# Patient Record
Sex: FEMALE | Race: WHITE | NOT HISPANIC OR LATINO
[De-identification: names, ages, dates, MRNs, and addresses within clinical notes are randomized per-mention and may not be internally consistent; named-entity substitution may affect disease eponyms.]

---

## 2018-08-27 PROBLEM — Z00.00 ENCOUNTER FOR PREVENTIVE HEALTH EXAMINATION: Status: ACTIVE | Noted: 2018-08-27

## 2018-08-28 ENCOUNTER — TRANSCRIPTION ENCOUNTER (OUTPATIENT)
Age: 58
End: 2018-08-28

## 2018-08-28 ENCOUNTER — APPOINTMENT (OUTPATIENT)
Dept: SURGERY | Facility: CLINIC | Age: 58
End: 2018-08-28
Payer: COMMERCIAL

## 2018-08-28 VITALS
BODY MASS INDEX: 39.89 KG/M2 | TEMPERATURE: 98.4 F | SYSTOLIC BLOOD PRESSURE: 146 MMHG | WEIGHT: 239.42 LBS | HEART RATE: 89 BPM | DIASTOLIC BLOOD PRESSURE: 88 MMHG | HEIGHT: 65 IN | OXYGEN SATURATION: 94 %

## 2018-08-28 DIAGNOSIS — Z86.79 PERSONAL HISTORY OF OTHER DISEASES OF THE CIRCULATORY SYSTEM: ICD-10-CM

## 2018-08-28 DIAGNOSIS — Z86.39 PERSONAL HISTORY OF OTHER ENDOCRINE, NUTRITIONAL AND METABOLIC DISEASE: ICD-10-CM

## 2018-08-28 DIAGNOSIS — Z78.9 OTHER SPECIFIED HEALTH STATUS: ICD-10-CM

## 2018-08-28 DIAGNOSIS — Z80.41 FAMILY HISTORY OF MALIGNANT NEOPLASM OF OVARY: ICD-10-CM

## 2018-08-28 DIAGNOSIS — Z80.42 FAMILY HISTORY OF MALIGNANT NEOPLASM OF PROSTATE: ICD-10-CM

## 2018-08-28 DIAGNOSIS — Z72.821 INADEQUATE SLEEP HYGIENE: ICD-10-CM

## 2018-08-28 DIAGNOSIS — Z80.3 FAMILY HISTORY OF MALIGNANT NEOPLASM OF BREAST: ICD-10-CM

## 2018-08-28 DIAGNOSIS — Z87.39 PERSONAL HISTORY OF OTHER DISEASES OF THE MUSCULOSKELETAL SYSTEM AND CONNECTIVE TISSUE: ICD-10-CM

## 2018-08-28 DIAGNOSIS — Z98.890 OTHER SPECIFIED POSTPROCEDURAL STATES: ICD-10-CM

## 2018-08-28 DIAGNOSIS — Z87.891 PERSONAL HISTORY OF NICOTINE DEPENDENCE: ICD-10-CM

## 2018-08-28 DIAGNOSIS — Z86.59 PERSONAL HISTORY OF OTHER MENTAL AND BEHAVIORAL DISORDERS: ICD-10-CM

## 2018-08-28 PROCEDURE — 99205 OFFICE O/P NEW HI 60 MIN: CPT

## 2018-08-29 PROBLEM — Z80.41 FAMILY HISTORY OF MALIGNANT NEOPLASM OF OVARY: Status: ACTIVE | Noted: 2018-08-28

## 2018-08-29 PROBLEM — Z80.42 FAMILY HISTORY OF MALIGNANT NEOPLASM OF PROSTATE: Status: ACTIVE | Noted: 2018-08-28

## 2018-08-29 PROBLEM — Z86.59 HISTORY OF DEPRESSION: Status: RESOLVED | Noted: 2018-08-28 | Resolved: 2018-08-29

## 2018-08-29 PROBLEM — Z86.79 HISTORY OF HYPERTENSION: Status: RESOLVED | Noted: 2018-08-28 | Resolved: 2018-08-29

## 2018-08-29 PROBLEM — Z80.3 FAMILY HISTORY OF BREAST CANCER: Status: ACTIVE | Noted: 2018-08-29

## 2018-08-29 PROBLEM — Z86.39 HISTORY OF DIABETES MELLITUS: Status: RESOLVED | Noted: 2018-08-28 | Resolved: 2018-08-29

## 2018-08-29 PROBLEM — Z80.3 FAMILY HISTORY OF MALIGNANT NEOPLASM OF BREAST: Status: ACTIVE | Noted: 2018-08-28

## 2018-08-29 PROBLEM — Z78.9 CAFFEINE USE: Status: ACTIVE | Noted: 2018-08-28

## 2018-08-29 PROBLEM — Z98.890 HISTORY OF BENIGN BREAST BIOPSY: Status: RESOLVED | Noted: 2018-08-28 | Resolved: 2018-08-29

## 2018-08-29 PROBLEM — Z86.39 HISTORY OF THYROID DISORDER: Status: RESOLVED | Noted: 2018-08-28 | Resolved: 2018-08-29

## 2018-08-29 PROBLEM — Z87.891 FORMER SMOKER: Status: ACTIVE | Noted: 2018-08-28

## 2018-08-29 PROBLEM — Z87.39 HISTORY OF ARTHRITIS: Status: RESOLVED | Noted: 2018-08-28 | Resolved: 2018-08-29

## 2018-08-29 PROBLEM — Z72.821 HISTORY OF DIFFICULTY SLEEPING: Status: RESOLVED | Noted: 2018-08-28 | Resolved: 2018-08-29

## 2018-08-29 PROBLEM — Z86.59 HISTORY OF ANXIETY: Status: RESOLVED | Noted: 2018-08-28 | Resolved: 2018-08-29

## 2018-08-29 PROBLEM — Z86.39 HISTORY OF HIGH CHOLESTEROL: Status: RESOLVED | Noted: 2018-08-28 | Resolved: 2018-08-29

## 2018-08-29 PROBLEM — Z80.41 FAMILY HISTORY OF OVARIAN CANCER: Status: ACTIVE | Noted: 2018-08-29

## 2018-08-30 ENCOUNTER — RESULT REVIEW (OUTPATIENT)
Age: 58
End: 2018-08-30

## 2018-08-30 ENCOUNTER — APPOINTMENT (OUTPATIENT)
Dept: ULTRASOUND IMAGING | Facility: CLINIC | Age: 58
End: 2018-08-30
Payer: COMMERCIAL

## 2018-08-30 ENCOUNTER — OUTPATIENT (OUTPATIENT)
Dept: OUTPATIENT SERVICES | Facility: HOSPITAL | Age: 58
LOS: 1 days | End: 2018-08-30
Payer: COMMERCIAL

## 2018-08-30 DIAGNOSIS — Z00.8 ENCOUNTER FOR OTHER GENERAL EXAMINATION: ICD-10-CM

## 2018-08-30 PROCEDURE — 19084 BX BREAST ADD LESION US IMAG: CPT

## 2018-08-30 PROCEDURE — 19083 BX BREAST 1ST LESION US IMAG: CPT

## 2018-08-30 PROCEDURE — 77065 DX MAMMO INCL CAD UNI: CPT | Mod: 26,LT

## 2018-08-30 PROCEDURE — 19084 BX BREAST ADD LESION US IMAG: CPT | Mod: LT

## 2018-08-30 PROCEDURE — 19083 BX BREAST 1ST LESION US IMAG: CPT | Mod: LT

## 2018-08-30 PROCEDURE — A4648: CPT

## 2018-08-30 PROCEDURE — 77065 DX MAMMO INCL CAD UNI: CPT

## 2018-09-07 ENCOUNTER — APPOINTMENT (OUTPATIENT)
Dept: SURGERY | Facility: CLINIC | Age: 58
End: 2018-09-07
Payer: COMMERCIAL

## 2018-09-07 ENCOUNTER — OUTPATIENT (OUTPATIENT)
Dept: OUTPATIENT SERVICES | Facility: HOSPITAL | Age: 58
LOS: 1 days | Discharge: HOME | End: 2018-09-07
Payer: COMMERCIAL

## 2018-09-07 DIAGNOSIS — C50.919 MALIGNANT NEOPLASM OF UNSPECIFIED SITE OF UNSPECIFIED FEMALE BREAST: ICD-10-CM

## 2018-09-07 PROCEDURE — 99215 OFFICE O/P EST HI 40 MIN: CPT

## 2018-09-13 ENCOUNTER — APPOINTMENT (OUTPATIENT)
Dept: HEMATOLOGY ONCOLOGY | Facility: CLINIC | Age: 58
End: 2018-09-13
Payer: COMMERCIAL

## 2018-09-13 VITALS
TEMPERATURE: 98.7 F | WEIGHT: 241.51 LBS | BODY MASS INDEX: 40.24 KG/M2 | HEART RATE: 96 BPM | DIASTOLIC BLOOD PRESSURE: 93 MMHG | OXYGEN SATURATION: 97 % | SYSTOLIC BLOOD PRESSURE: 148 MMHG | HEIGHT: 65 IN

## 2018-09-13 PROCEDURE — 99205 OFFICE O/P NEW HI 60 MIN: CPT

## 2018-09-14 ENCOUNTER — OUTPATIENT (OUTPATIENT)
Dept: OUTPATIENT SERVICES | Facility: HOSPITAL | Age: 58
LOS: 1 days | End: 2018-09-14
Payer: COMMERCIAL

## 2018-09-14 ENCOUNTER — APPOINTMENT (OUTPATIENT)
Dept: MRI IMAGING | Facility: CLINIC | Age: 58
End: 2018-09-14
Payer: COMMERCIAL

## 2018-09-14 DIAGNOSIS — Z00.8 ENCOUNTER FOR OTHER GENERAL EXAMINATION: ICD-10-CM

## 2018-09-14 PROCEDURE — C8908: CPT

## 2018-09-14 PROCEDURE — 0159T: CPT | Mod: 26

## 2018-09-14 PROCEDURE — 77059 MRI BREAST BILATERAL: CPT | Mod: 26

## 2018-09-14 PROCEDURE — A9585: CPT

## 2018-09-14 PROCEDURE — C8937: CPT

## 2018-09-17 ENCOUNTER — APPOINTMENT (OUTPATIENT)
Dept: SURGERY | Facility: CLINIC | Age: 58
End: 2018-09-17

## 2018-09-17 ENCOUNTER — APPOINTMENT (OUTPATIENT)
Dept: SURGERY | Facility: CLINIC | Age: 58
End: 2018-09-17
Payer: COMMERCIAL

## 2018-09-17 VITALS
BODY MASS INDEX: 39.99 KG/M2 | HEIGHT: 65 IN | SYSTOLIC BLOOD PRESSURE: 146 MMHG | DIASTOLIC BLOOD PRESSURE: 83 MMHG | TEMPERATURE: 99 F | OXYGEN SATURATION: 85 % | HEART RATE: 53 BPM | WEIGHT: 240 LBS

## 2018-09-17 PROCEDURE — 99214 OFFICE O/P EST MOD 30 MIN: CPT

## 2018-09-20 ENCOUNTER — LABORATORY RESULT (OUTPATIENT)
Age: 58
End: 2018-09-20

## 2018-09-20 ENCOUNTER — APPOINTMENT (OUTPATIENT)
Dept: HEMATOLOGY ONCOLOGY | Facility: CLINIC | Age: 58
End: 2018-09-20
Payer: COMMERCIAL

## 2018-09-20 ENCOUNTER — RESULT REVIEW (OUTPATIENT)
Age: 58
End: 2018-09-20

## 2018-09-20 VITALS
SYSTOLIC BLOOD PRESSURE: 150 MMHG | HEIGHT: 65 IN | DIASTOLIC BLOOD PRESSURE: 90 MMHG | WEIGHT: 239.97 LBS | TEMPERATURE: 98.6 F | BODY MASS INDEX: 39.98 KG/M2 | HEART RATE: 78 BPM | OXYGEN SATURATION: 95 %

## 2018-09-20 LAB
BASOPHILS # BLD AUTO: 0.1 K/UL — SIGNIFICANT CHANGE UP (ref 0–0.2)
BASOPHILS NFR BLD AUTO: 1.1 % — SIGNIFICANT CHANGE UP (ref 0–2)
EOSINOPHIL # BLD AUTO: 0.1 K/UL — SIGNIFICANT CHANGE UP (ref 0–0.5)
EOSINOPHIL NFR BLD AUTO: 1.9 % — SIGNIFICANT CHANGE UP (ref 0–6)
HCT VFR BLD CALC: 51.3 % — HIGH (ref 34.5–45)
HGB BLD-MCNC: 16.4 G/DL — HIGH (ref 11.5–15.5)
LYMPHOCYTES # BLD AUTO: 2.5 K/UL — SIGNIFICANT CHANGE UP (ref 1–3.3)
LYMPHOCYTES # BLD AUTO: 37.2 % — SIGNIFICANT CHANGE UP (ref 13–44)
MCHC RBC-ENTMCNC: 29.4 PG — SIGNIFICANT CHANGE UP (ref 27–34)
MCHC RBC-ENTMCNC: 31.9 GM/DL — LOW (ref 32–36)
MCV RBC AUTO: 92.2 FL — SIGNIFICANT CHANGE UP (ref 80–100)
MONOCYTES # BLD AUTO: 0.5 K/UL — SIGNIFICANT CHANGE UP (ref 0–0.9)
MONOCYTES NFR BLD AUTO: 6.9 % — SIGNIFICANT CHANGE UP (ref 2–14)
NEUTROPHILS # BLD AUTO: 3.5 K/UL — SIGNIFICANT CHANGE UP (ref 1.8–7.4)
NEUTROPHILS NFR BLD AUTO: 52.9 % — SIGNIFICANT CHANGE UP (ref 43–77)
PLATELET # BLD AUTO: 260 K/UL — SIGNIFICANT CHANGE UP (ref 150–400)
RBC # BLD: 5.57 M/UL — HIGH (ref 3.8–5.2)
RBC # FLD: 12 % — SIGNIFICANT CHANGE UP (ref 10.3–14.5)
WBC # BLD: 6.6 K/UL — SIGNIFICANT CHANGE UP (ref 3.8–10.5)
WBC # FLD AUTO: 6.6 K/UL — SIGNIFICANT CHANGE UP (ref 3.8–10.5)

## 2018-09-20 PROCEDURE — 99214 OFFICE O/P EST MOD 30 MIN: CPT

## 2018-09-21 LAB
ALBUMIN SERPL ELPH-MCNC: 5 G/DL
ALP BLD-CCNC: 76 U/L
ALT SERPL-CCNC: 18 U/L
ANION GAP SERPL CALC-SCNC: 14 MMOL/L
APTT BLD: 31 SEC
AST SERPL-CCNC: 19 U/L
BILIRUB SERPL-MCNC: 0.4 MG/DL
BUN SERPL-MCNC: 15 MG/DL
CALCIUM SERPL-MCNC: 9.9 MG/DL
CHLORIDE SERPL-SCNC: 101 MMOL/L
CO2 SERPL-SCNC: 26 MMOL/L
CREAT SERPL-MCNC: 0.79 MG/DL
HBV CORE IGM SER QL: NONREACTIVE
HBV SURFACE AB SER QL: NONREACTIVE
HBV SURFACE AB SERPL IA-ACNC: <3 MIU/ML
HBV SURFACE AG SER QL: NONREACTIVE
HCV AB SER QL: NONREACTIVE
HCV S/CO RATIO: 0.14 S/CO
INR PPP: 0.99 RATIO
MAGNESIUM SERPL-MCNC: 2.4 MG/DL
POTASSIUM SERPL-SCNC: 5.1 MMOL/L
PROT SERPL-MCNC: 7.4 G/DL
PT BLD: 11.2 SEC
SODIUM SERPL-SCNC: 141 MMOL/L
TSH SERPL-ACNC: 7.53 UIU/ML

## 2018-09-26 ENCOUNTER — FORM ENCOUNTER (OUTPATIENT)
Age: 58
End: 2018-09-26

## 2018-09-26 ENCOUNTER — APPOINTMENT (OUTPATIENT)
Dept: INTERVENTIONAL RADIOLOGY/VASCULAR | Facility: CLINIC | Age: 58
End: 2018-09-26

## 2018-09-27 ENCOUNTER — OUTPATIENT (OUTPATIENT)
Dept: OUTPATIENT SERVICES | Facility: HOSPITAL | Age: 58
LOS: 1 days | End: 2018-09-27
Payer: COMMERCIAL

## 2018-09-27 ENCOUNTER — APPOINTMENT (OUTPATIENT)
Dept: CARDIOLOGY | Facility: CLINIC | Age: 58
End: 2018-09-27

## 2018-09-27 VITALS
OXYGEN SATURATION: 96 % | WEIGHT: 240.08 LBS | DIASTOLIC BLOOD PRESSURE: 91 MMHG | SYSTOLIC BLOOD PRESSURE: 146 MMHG | HEIGHT: 65 IN | RESPIRATION RATE: 16 BRPM | TEMPERATURE: 98 F | HEART RATE: 71 BPM

## 2018-09-27 VITALS
HEART RATE: 66 BPM | RESPIRATION RATE: 16 BRPM | SYSTOLIC BLOOD PRESSURE: 146 MMHG | OXYGEN SATURATION: 99 % | DIASTOLIC BLOOD PRESSURE: 87 MMHG

## 2018-09-27 DIAGNOSIS — C50.912 MALIGNANT NEOPLASM OF UNSPECIFIED SITE OF LEFT FEMALE BREAST: ICD-10-CM

## 2018-09-27 PROCEDURE — 76937 US GUIDE VASCULAR ACCESS: CPT | Mod: 26

## 2018-09-27 PROCEDURE — 36561 INSERT TUNNELED CV CATH: CPT

## 2018-09-27 PROCEDURE — 76942 ECHO GUIDE FOR BIOPSY: CPT

## 2018-09-27 PROCEDURE — C1769: CPT

## 2018-09-27 PROCEDURE — C1788: CPT

## 2018-09-27 PROCEDURE — 77001 FLUOROGUIDE FOR VEIN DEVICE: CPT

## 2018-09-27 PROCEDURE — 77001 FLUOROGUIDE FOR VEIN DEVICE: CPT | Mod: 26

## 2018-09-27 RX ADMIN — Medication 100 MILLIGRAM(S): at 15:49

## 2018-09-27 NOTE — BRIEF OPERATIVE NOTE - PROCEDURE
<<-----Click on this checkbox to enter Procedure Imaging guidance for placement of Port-A-Cath  09/27/2018    Active  JASON

## 2018-09-27 NOTE — BRIEF OPERATIVE NOTE - COMMENTS
successful RIJ approach port catheter placement with the tip in the superior cavoatrial junction  Port is ready for immediate use

## 2018-09-28 ENCOUNTER — APPOINTMENT (OUTPATIENT)
Age: 58
End: 2018-09-28

## 2018-09-28 ENCOUNTER — RX RENEWAL (OUTPATIENT)
Age: 58
End: 2018-09-28

## 2018-09-28 ENCOUNTER — LABORATORY RESULT (OUTPATIENT)
Age: 58
End: 2018-09-28

## 2018-09-28 ENCOUNTER — RESULT REVIEW (OUTPATIENT)
Age: 58
End: 2018-09-28

## 2018-09-28 ENCOUNTER — APPOINTMENT (OUTPATIENT)
Dept: CARDIOLOGY | Facility: CLINIC | Age: 58
End: 2018-09-28
Payer: COMMERCIAL

## 2018-09-28 LAB
BASOPHILS # BLD AUTO: 0.1 K/UL — SIGNIFICANT CHANGE UP (ref 0–0.2)
BASOPHILS NFR BLD AUTO: 0.6 % — SIGNIFICANT CHANGE UP (ref 0–2)
EOSINOPHIL # BLD AUTO: 0.1 K/UL — SIGNIFICANT CHANGE UP (ref 0–0.5)
EOSINOPHIL NFR BLD AUTO: 0.9 % — SIGNIFICANT CHANGE UP (ref 0–6)
HCT VFR BLD CALC: 44.5 % — SIGNIFICANT CHANGE UP (ref 34.5–45)
HGB BLD-MCNC: 14.7 G/DL — SIGNIFICANT CHANGE UP (ref 11.5–15.5)
LYMPHOCYTES # BLD AUTO: 3.8 K/UL — HIGH (ref 1–3.3)
LYMPHOCYTES # BLD AUTO: 41.2 % — SIGNIFICANT CHANGE UP (ref 13–44)
MCHC RBC-ENTMCNC: 29.8 PG — SIGNIFICANT CHANGE UP (ref 27–34)
MCHC RBC-ENTMCNC: 33 GM/DL — SIGNIFICANT CHANGE UP (ref 32–36)
MCV RBC AUTO: 90.4 FL — SIGNIFICANT CHANGE UP (ref 80–100)
MONOCYTES # BLD AUTO: 0.6 K/UL — SIGNIFICANT CHANGE UP (ref 0–0.9)
MONOCYTES NFR BLD AUTO: 6.6 % — SIGNIFICANT CHANGE UP (ref 2–14)
NEUTROPHILS # BLD AUTO: 4.7 K/UL — SIGNIFICANT CHANGE UP (ref 1.8–7.4)
NEUTROPHILS NFR BLD AUTO: 50.7 % — SIGNIFICANT CHANGE UP (ref 43–77)
PLATELET # BLD AUTO: 184 K/UL — SIGNIFICANT CHANGE UP (ref 150–400)
RBC # BLD: 4.92 M/UL — SIGNIFICANT CHANGE UP (ref 3.8–5.2)
RBC # FLD: 11.9 % — SIGNIFICANT CHANGE UP (ref 10.3–14.5)
WBC # BLD: 9.3 K/UL — SIGNIFICANT CHANGE UP (ref 3.8–10.5)
WBC # FLD AUTO: 9.3 K/UL — SIGNIFICANT CHANGE UP (ref 3.8–10.5)

## 2018-09-28 PROCEDURE — 0399T: CPT

## 2018-09-28 PROCEDURE — 93306 TTE W/DOPPLER COMPLETE: CPT

## 2018-09-28 PROCEDURE — 93010 ELECTROCARDIOGRAM REPORT: CPT

## 2018-10-01 ENCOUNTER — APPOINTMENT (OUTPATIENT)
Age: 58
End: 2018-10-01

## 2018-10-01 DIAGNOSIS — Z51.11 ENCOUNTER FOR ANTINEOPLASTIC CHEMOTHERAPY: ICD-10-CM

## 2018-10-01 DIAGNOSIS — Z51.89 ENCOUNTER FOR OTHER SPECIFIED AFTERCARE: ICD-10-CM

## 2018-10-01 DIAGNOSIS — R11.2 NAUSEA WITH VOMITING, UNSPECIFIED: ICD-10-CM

## 2018-10-02 ENCOUNTER — APPOINTMENT (OUTPATIENT)
Dept: PLASTIC SURGERY | Facility: CLINIC | Age: 58
End: 2018-10-02

## 2018-10-03 ENCOUNTER — OUTPATIENT (OUTPATIENT)
Dept: OUTPATIENT SERVICES | Facility: HOSPITAL | Age: 58
LOS: 1 days | Discharge: HOME | End: 2018-10-03

## 2018-10-03 DIAGNOSIS — C50.919 MALIGNANT NEOPLASM OF UNSPECIFIED SITE OF UNSPECIFIED FEMALE BREAST: ICD-10-CM

## 2018-10-09 ENCOUNTER — APPOINTMENT (OUTPATIENT)
Dept: HEMATOLOGY ONCOLOGY | Facility: CLINIC | Age: 58
End: 2018-10-09
Payer: COMMERCIAL

## 2018-10-09 ENCOUNTER — RESULT REVIEW (OUTPATIENT)
Age: 58
End: 2018-10-09

## 2018-10-09 VITALS
HEIGHT: 65 IN | DIASTOLIC BLOOD PRESSURE: 91 MMHG | HEART RATE: 99 BPM | BODY MASS INDEX: 39.34 KG/M2 | WEIGHT: 236.11 LBS | SYSTOLIC BLOOD PRESSURE: 134 MMHG | TEMPERATURE: 99.8 F | OXYGEN SATURATION: 96 %

## 2018-10-09 DIAGNOSIS — R19.7 DIARRHEA, UNSPECIFIED: ICD-10-CM

## 2018-10-09 LAB
BASOPHILS # BLD AUTO: 0.3 K/UL — HIGH (ref 0–0.2)
BASOPHILS NFR BLD AUTO: 2.1 % — HIGH (ref 0–2)
EOSINOPHIL # BLD AUTO: 0.3 K/UL — SIGNIFICANT CHANGE UP (ref 0–0.5)
EOSINOPHIL NFR BLD AUTO: 2.2 % — SIGNIFICANT CHANGE UP (ref 0–6)
HCT VFR BLD CALC: 45.6 % — HIGH (ref 34.5–45)
HGB BLD-MCNC: 15 G/DL — SIGNIFICANT CHANGE UP (ref 11.5–15.5)
LYMPHOCYTES # BLD AUTO: 16.8 % — SIGNIFICANT CHANGE UP (ref 13–44)
LYMPHOCYTES # BLD AUTO: 2.2 K/UL — SIGNIFICANT CHANGE UP (ref 1–3.3)
MCHC RBC-ENTMCNC: 29.6 PG — SIGNIFICANT CHANGE UP (ref 27–34)
MCHC RBC-ENTMCNC: 32.9 GM/DL — SIGNIFICANT CHANGE UP (ref 32–36)
MCV RBC AUTO: 90.1 FL — SIGNIFICANT CHANGE UP (ref 80–100)
MONOCYTES # BLD AUTO: 0.6 K/UL — SIGNIFICANT CHANGE UP (ref 0–0.9)
MONOCYTES NFR BLD AUTO: 4.3 % — SIGNIFICANT CHANGE UP (ref 2–14)
NEUTROPHILS # BLD AUTO: 9.9 K/UL — HIGH (ref 1.8–7.4)
NEUTROPHILS NFR BLD AUTO: 74.6 % — SIGNIFICANT CHANGE UP (ref 43–77)
PLATELET # BLD AUTO: 150 K/UL — SIGNIFICANT CHANGE UP (ref 150–400)
RBC # BLD: 5.06 M/UL — SIGNIFICANT CHANGE UP (ref 3.8–5.2)
RBC # FLD: 11.4 % — SIGNIFICANT CHANGE UP (ref 10.3–14.5)
WBC # BLD: 13.2 K/UL — HIGH (ref 3.8–10.5)
WBC # FLD AUTO: 13.2 K/UL — HIGH (ref 3.8–10.5)

## 2018-10-09 PROCEDURE — 99214 OFFICE O/P EST MOD 30 MIN: CPT

## 2018-10-12 ENCOUNTER — LABORATORY RESULT (OUTPATIENT)
Age: 58
End: 2018-10-12

## 2018-10-12 ENCOUNTER — APPOINTMENT (OUTPATIENT)
Age: 58
End: 2018-10-12

## 2018-10-12 ENCOUNTER — RESULT REVIEW (OUTPATIENT)
Age: 58
End: 2018-10-12

## 2018-10-12 LAB
BASOPHILS # BLD AUTO: 0.2 K/UL — SIGNIFICANT CHANGE UP (ref 0–0.2)
BASOPHILS NFR BLD AUTO: 2 % — SIGNIFICANT CHANGE UP (ref 0–2)
EOSINOPHIL # BLD AUTO: 0.1 K/UL — SIGNIFICANT CHANGE UP (ref 0–0.5)
EOSINOPHIL NFR BLD AUTO: 0.7 % — SIGNIFICANT CHANGE UP (ref 0–6)
HCT VFR BLD CALC: 45.2 % — HIGH (ref 34.5–45)
HGB BLD-MCNC: 15.1 G/DL — SIGNIFICANT CHANGE UP (ref 11.5–15.5)
LYMPHOCYTES # BLD AUTO: 2.1 K/UL — SIGNIFICANT CHANGE UP (ref 1–3.3)
LYMPHOCYTES # BLD AUTO: 20.6 % — SIGNIFICANT CHANGE UP (ref 13–44)
MCHC RBC-ENTMCNC: 30 PG — SIGNIFICANT CHANGE UP (ref 27–34)
MCHC RBC-ENTMCNC: 33.3 GM/DL — SIGNIFICANT CHANGE UP (ref 32–36)
MCV RBC AUTO: 90.1 FL — SIGNIFICANT CHANGE UP (ref 80–100)
MONOCYTES # BLD AUTO: 0.6 K/UL — SIGNIFICANT CHANGE UP (ref 0–0.9)
MONOCYTES NFR BLD AUTO: 6.4 % — SIGNIFICANT CHANGE UP (ref 2–14)
NEUTROPHILS # BLD AUTO: 7 K/UL — SIGNIFICANT CHANGE UP (ref 1.8–7.4)
NEUTROPHILS NFR BLD AUTO: 70.2 % — SIGNIFICANT CHANGE UP (ref 43–77)
PLATELET # BLD AUTO: 261 K/UL — SIGNIFICANT CHANGE UP (ref 150–400)
RBC # BLD: 5.01 M/UL — SIGNIFICANT CHANGE UP (ref 3.8–5.2)
RBC # FLD: 11.7 % — SIGNIFICANT CHANGE UP (ref 10.3–14.5)
WBC # BLD: 10 K/UL — SIGNIFICANT CHANGE UP (ref 3.8–10.5)
WBC # FLD AUTO: 10 K/UL — SIGNIFICANT CHANGE UP (ref 3.8–10.5)

## 2018-10-15 DIAGNOSIS — Z51.89 ENCOUNTER FOR OTHER SPECIFIED AFTERCARE: ICD-10-CM

## 2018-10-15 DIAGNOSIS — R11.2 NAUSEA WITH VOMITING, UNSPECIFIED: ICD-10-CM

## 2018-10-15 DIAGNOSIS — Z51.11 ENCOUNTER FOR ANTINEOPLASTIC CHEMOTHERAPY: ICD-10-CM

## 2018-10-15 LAB
C DIFF TOX GENS STL QL NAA+PROBE: NORMAL
CDIFF BY PCR: DETECTED

## 2018-10-19 ENCOUNTER — APPOINTMENT (OUTPATIENT)
Dept: HEMATOLOGY ONCOLOGY | Facility: CLINIC | Age: 58
End: 2018-10-19

## 2018-10-26 ENCOUNTER — RESULT REVIEW (OUTPATIENT)
Age: 58
End: 2018-10-26

## 2018-10-26 ENCOUNTER — LABORATORY RESULT (OUTPATIENT)
Age: 58
End: 2018-10-26

## 2018-10-26 ENCOUNTER — APPOINTMENT (OUTPATIENT)
Age: 58
End: 2018-10-26

## 2018-10-26 LAB
BASOPHILS # BLD AUTO: 0.3 K/UL — HIGH (ref 0–0.2)
BASOPHILS NFR BLD AUTO: 1.4 % — SIGNIFICANT CHANGE UP (ref 0–2)
EOSINOPHIL # BLD AUTO: 0 K/UL — SIGNIFICANT CHANGE UP (ref 0–0.5)
EOSINOPHIL NFR BLD AUTO: 0.1 % — SIGNIFICANT CHANGE UP (ref 0–6)
HCT VFR BLD CALC: 44.2 % — SIGNIFICANT CHANGE UP (ref 34.5–45)
HGB BLD-MCNC: 14.8 G/DL — SIGNIFICANT CHANGE UP (ref 11.5–15.5)
LYMPHOCYTES # BLD AUTO: 1.5 K/UL — SIGNIFICANT CHANGE UP (ref 1–3.3)
LYMPHOCYTES # BLD AUTO: 7.3 % — LOW (ref 13–44)
MCHC RBC-ENTMCNC: 29.8 PG — SIGNIFICANT CHANGE UP (ref 27–34)
MCHC RBC-ENTMCNC: 33.5 GM/DL — SIGNIFICANT CHANGE UP (ref 32–36)
MCV RBC AUTO: 88.8 FL — SIGNIFICANT CHANGE UP (ref 80–100)
MONOCYTES # BLD AUTO: 1.2 K/UL — HIGH (ref 0–0.9)
MONOCYTES NFR BLD AUTO: 5.7 % — SIGNIFICANT CHANGE UP (ref 2–14)
NEUTROPHILS # BLD AUTO: 17.8 K/UL — HIGH (ref 1.8–7.4)
NEUTROPHILS NFR BLD AUTO: 85.5 % — HIGH (ref 43–77)
PLATELET # BLD AUTO: 190 K/UL — SIGNIFICANT CHANGE UP (ref 150–400)
RBC # BLD: 4.98 M/UL — SIGNIFICANT CHANGE UP (ref 3.8–5.2)
RBC # FLD: 12.2 % — SIGNIFICANT CHANGE UP (ref 10.3–14.5)
WBC # BLD: 20.8 K/UL — HIGH (ref 3.8–10.5)
WBC # FLD AUTO: 20.8 K/UL — HIGH (ref 3.8–10.5)

## 2018-11-08 ENCOUNTER — OUTPATIENT (OUTPATIENT)
Dept: OUTPATIENT SERVICES | Facility: HOSPITAL | Age: 58
LOS: 1 days | Discharge: HOME | End: 2018-11-08
Payer: COMMERCIAL

## 2018-11-08 DIAGNOSIS — C50.919 MALIGNANT NEOPLASM OF UNSPECIFIED SITE OF UNSPECIFIED FEMALE BREAST: ICD-10-CM

## 2018-11-09 ENCOUNTER — LABORATORY RESULT (OUTPATIENT)
Age: 58
End: 2018-11-09

## 2018-11-09 ENCOUNTER — RESULT REVIEW (OUTPATIENT)
Age: 58
End: 2018-11-09

## 2018-11-09 ENCOUNTER — APPOINTMENT (OUTPATIENT)
Age: 58
End: 2018-11-09

## 2018-11-09 ENCOUNTER — APPOINTMENT (OUTPATIENT)
Dept: HEMATOLOGY ONCOLOGY | Facility: CLINIC | Age: 58
End: 2018-11-09
Payer: COMMERCIAL

## 2018-11-09 VITALS
DIASTOLIC BLOOD PRESSURE: 94 MMHG | BODY MASS INDEX: 39.63 KG/M2 | HEIGHT: 65 IN | OXYGEN SATURATION: 96 % | TEMPERATURE: 96 F | SYSTOLIC BLOOD PRESSURE: 137 MMHG | HEART RATE: 85 BPM | WEIGHT: 237.87 LBS

## 2018-11-09 LAB
BASOPHILS # BLD AUTO: 0.2 K/UL — SIGNIFICANT CHANGE UP (ref 0–0.2)
BASOPHILS NFR BLD AUTO: 1.5 % — SIGNIFICANT CHANGE UP (ref 0–2)
EOSINOPHIL # BLD AUTO: 0 K/UL — SIGNIFICANT CHANGE UP (ref 0–0.5)
EOSINOPHIL NFR BLD AUTO: 0.4 % — SIGNIFICANT CHANGE UP (ref 0–6)
HCT VFR BLD CALC: 38.9 % — SIGNIFICANT CHANGE UP (ref 34.5–45)
HGB BLD-MCNC: 13.1 G/DL — SIGNIFICANT CHANGE UP (ref 11.5–15.5)
LYMPHOCYTES # BLD AUTO: 1.1 K/UL — SIGNIFICANT CHANGE UP (ref 1–3.3)
LYMPHOCYTES # BLD AUTO: 9.4 % — LOW (ref 13–44)
MCHC RBC-ENTMCNC: 30 PG — SIGNIFICANT CHANGE UP (ref 27–34)
MCHC RBC-ENTMCNC: 33.6 GM/DL — SIGNIFICANT CHANGE UP (ref 32–36)
MCV RBC AUTO: 89.1 FL — SIGNIFICANT CHANGE UP (ref 80–100)
MONOCYTES # BLD AUTO: 1 K/UL — HIGH (ref 0–0.9)
MONOCYTES NFR BLD AUTO: 8.8 % — SIGNIFICANT CHANGE UP (ref 2–14)
NEUTROPHILS # BLD AUTO: 9.4 K/UL — HIGH (ref 1.8–7.4)
NEUTROPHILS NFR BLD AUTO: 79.9 % — HIGH (ref 43–77)
PLATELET # BLD AUTO: 198 K/UL — SIGNIFICANT CHANGE UP (ref 150–400)
RBC # BLD: 4.36 M/UL — SIGNIFICANT CHANGE UP (ref 3.8–5.2)
RBC # FLD: 14 % — SIGNIFICANT CHANGE UP (ref 10.3–14.5)
WBC # BLD: 11.8 K/UL — HIGH (ref 3.8–10.5)
WBC # FLD AUTO: 11.8 K/UL — HIGH (ref 3.8–10.5)

## 2018-11-09 PROCEDURE — 99214 OFFICE O/P EST MOD 30 MIN: CPT

## 2018-11-12 DIAGNOSIS — R11.2 NAUSEA WITH VOMITING, UNSPECIFIED: ICD-10-CM

## 2018-11-12 DIAGNOSIS — Z51.89 ENCOUNTER FOR OTHER SPECIFIED AFTERCARE: ICD-10-CM

## 2018-11-12 DIAGNOSIS — Z51.11 ENCOUNTER FOR ANTINEOPLASTIC CHEMOTHERAPY: ICD-10-CM

## 2018-11-12 NOTE — ASSESSMENT
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  Breast MRI with multicentric disease in left breast, largest abnormal enhancement measures 6.7 cm and extends towards 2.4 cm retroareolar mass.  No axillary adenopathy. ECHO 9/28/18 65.1% \par \par DD  AC w/ OnPro- followed by Taxol. Plan for adjuvant endocrine therapy after surgery.   C 1 9/28/18, C2 10/12, C3 10/26.  \par \par Reports increased fatigue and nausea, better this week than last. Counts stable. Discussed improved toleration when on weekly Taxol.\par \par Plan:\par - Proceed today w/ C4 DD AC for WBC 11.8- likely RT to OnPro. \par - +C. Diff prior to C3- resolved w/ Vanco- no recurrent symptoms.\par - Insomnia- Zolpidem 10 mg Q HS prn insomnia.\par - Nausea: add dexamethasone 2 mg BID added to prochlorperazine X 5 days PRN.\par - RTO in 2 weeks for weekly Taxol X 12.\par \par \par \par

## 2018-11-12 NOTE — PHYSICAL EXAM
[Restricted in physically strenuous activity but ambulatory and able to carry out work of a light or sedentary nature] : Status 1- Restricted in physically strenuous activity but ambulatory and able to carry out work of a light or sedentary nature, e.g., light house work, office work [Normal] : affect appropriate [de-identified] : epiphora [de-identified] : negative cervical, supra/infraclavicular adenopathy. [de-identified] : negative pedal edema or calve tenderness [de-identified] : L breast mass ~ 1 cm smaller in size, more mobile, no palpable L axillary adenopathy.  [de-identified] : BS+, soft, nontender on palpation. [de-identified] : without spinal or cva tenderness.

## 2018-11-12 NOTE — HISTORY OF PRESENT ILLNESS
[Disease: _____________________] : Disease: [unfilled] [T: ___] : T[unfilled] [de-identified] : Ms. KRISTIE GARZON, is a postmenopausal female who was diagnosed with poorly differentiated invasive ductal carcinoma of left breast cancer, ER >90% HI>90%, HER2 negative.   \par \par At initial presentation, she noted L nipple inversion early August 2018 and was sent for a diagnostic mammo and sonogram by her PCP. In addition, she also had noted a left breast mass ~ 8 months ago, and began to notice more pain at the site. She has not gone for screening mammo in a few years. In the last year, she has lost both her parents, and w/ other family issues, neglected the left breast mass. \par \par Mammogram on 8/27/18 demonstrated a 3.0 cm spiculated mass in the left breast retroareolar region overlying skin retraction and nipple inversion, highly suspicious for malignancy.  US breast showed left breast retroareolar 2.5 x 2 x 3.5 cm irregular hypoechoic solid shadowing mass with ill defined borders corresponding with spiculated mass demonstrated mammographically with morphologic features highly suspicious for malignancy as well as 1.3 x 0.5 x 1.0 cm cystic cluster at 3:00 7cmfn. \par \par She saw Dr. Riggs on 8/28/18 and US guided core biopsy was performed on 8/30/18.  \par \par 8/30/18 Pathology\par 1. Left breast, retroareolar, ultrasound guided core biopsy - poorly differentiated IDC with micropapillary features. ER 90%, HI 95%, HER2 negative.\par - Steven score 8/9 (3 + 3 + 2) in this limited material.\par  - Invasive tumor measures at least 1.3 cm\par   2. Left breast, 3:00, 6 cm fn, - poorly differentiated IDC  with  micropapillary features. ER>90% HI>90%, HER2 negative\par - [place] score 8/9 \par \par 9/14/18 MRI breast - LEFT BREAST: *  An irregularly shaped enhancing mass in the left retroareolar region   is compatible with a site of biopsy-proven malignancy. It measures  approximately 2.4 x 2.1 x 2.2 centimeters (AP by TV by CC). There is  involvement of the nipple, with retraction. Susceptibility artifact from  the biopsy marker is noted in the center of the mass. *  In the 3:00 axis, middle third, there is an irregularly shaped  enhancing mass with surrounding non mass enhancement. This is compatible  with the additional site of biopsy-proven malignancy. Overall, the  abnormal enhancement measures 6.7 x 2.8 x 2.1 centimeters (AP x TV x CC).  The AP extent of the associated non mass enhancement measures  significantly larger than depicted on ultrasound, and extends towards the  site of malignancy in the retroareolar region.\par \par \par Family hx: maternal GM breast cancer (early 50's), maternal 1st cousin (late 50's), 1 maternal aunts ovarian, another maternal aunt w/ 2 unknown cancers. Father w/ prostate cancer. 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 No illicit drug use.\par \par PMH: Hashimoto's (53 y/o), was on thyroid medication in the past, no longer, HTN, arthritis, pre-diabetic, reflux (takes an occasional Zantac, and ventral hernia. Claustrophobia w/ MRI, situational anxiety/depression.\par \par Past sx hx: polyp removal (was told benign), cholecystomy, L knee sx. \par \par Health Care Providers:\par PCP: Dr. Rashard Posada ([place])\par  [de-identified] : poorly differentiated IDC [de-identified] : +  C.Diff completed 10 days of vancomycin and has no recurrent diarrhea or any abdominal pain. Denies fevers, SOB w/ exertion only, no cough, appetite is fair-good, +nausea day 1- 6, only vomited 3 times over the course of 2 days.  Reports no constipation and again no diarrhea. No pain or burning w/ urination, no swelling in LE. Energy level is now pooor. She performing most of her ADL's - except took an entire week off last week. Worked 2 days this week. She is having difficulty sleeping- she is only napping for short periods of time during the day. After falling asleep she is only sleeping 2-3 hrs a time. She has never used a rx p[ain med before but is requesting one. Her eyes are irritated and she is tearing  al ot-- she tried Claritin and it did not help.

## 2018-11-19 ENCOUNTER — RX RENEWAL (OUTPATIENT)
Age: 58
End: 2018-11-19

## 2018-11-23 ENCOUNTER — RESULT REVIEW (OUTPATIENT)
Age: 58
End: 2018-11-23

## 2018-11-23 ENCOUNTER — LABORATORY RESULT (OUTPATIENT)
Age: 58
End: 2018-11-23

## 2018-11-23 ENCOUNTER — APPOINTMENT (OUTPATIENT)
Age: 58
End: 2018-11-23

## 2018-11-23 ENCOUNTER — APPOINTMENT (OUTPATIENT)
Dept: HEMATOLOGY ONCOLOGY | Facility: CLINIC | Age: 58
End: 2018-11-23
Payer: COMMERCIAL

## 2018-11-23 LAB
BASOPHILS # BLD AUTO: 0.2 K/UL — SIGNIFICANT CHANGE UP (ref 0–0.2)
BASOPHILS NFR BLD AUTO: 1.5 % — SIGNIFICANT CHANGE UP (ref 0–2)
EOSINOPHIL # BLD AUTO: 0.1 K/UL — SIGNIFICANT CHANGE UP (ref 0–0.5)
EOSINOPHIL NFR BLD AUTO: 0.6 % — SIGNIFICANT CHANGE UP (ref 0–6)
HCT VFR BLD CALC: 42.9 % — SIGNIFICANT CHANGE UP (ref 34.5–45)
HGB BLD-MCNC: 13.9 G/DL — SIGNIFICANT CHANGE UP (ref 11.5–15.5)
LYMPHOCYTES # BLD AUTO: 0.7 K/UL — LOW (ref 1–3.3)
LYMPHOCYTES # BLD AUTO: 5 % — LOW (ref 13–44)
MCHC RBC-ENTMCNC: 29.6 PG — SIGNIFICANT CHANGE UP (ref 27–34)
MCHC RBC-ENTMCNC: 32.5 GM/DL — SIGNIFICANT CHANGE UP (ref 32–36)
MCV RBC AUTO: 91.2 FL — SIGNIFICANT CHANGE UP (ref 80–100)
MONOCYTES # BLD AUTO: 1.1 K/UL — HIGH (ref 0–0.9)
MONOCYTES NFR BLD AUTO: 8.6 % — SIGNIFICANT CHANGE UP (ref 2–14)
NEUTROPHILS # BLD AUTO: 11 K/UL — HIGH (ref 1.8–7.4)
NEUTROPHILS NFR BLD AUTO: 84.2 % — HIGH (ref 43–77)
PLATELET # BLD AUTO: 166 K/UL — SIGNIFICANT CHANGE UP (ref 150–400)
RBC # BLD: 4.71 M/UL — SIGNIFICANT CHANGE UP (ref 3.8–5.2)
RBC # FLD: 15.6 % — HIGH (ref 10.3–14.5)
WBC # BLD: 13 K/UL — HIGH (ref 3.8–10.5)
WBC # FLD AUTO: 13 K/UL — HIGH (ref 3.8–10.5)

## 2018-11-23 PROCEDURE — 99214 OFFICE O/P EST MOD 30 MIN: CPT

## 2018-11-23 NOTE — ASSESSMENT
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  Breast MRI with multicentric disease in left breast, largest abnormal enhancement measures 6.7 cm and extends towards 2.4 cm retroareolar mass.  No axillary adenopathy. ECHO 9/28/18 65.1% \par Began neoadjuvant chemotherapy with dose dense AC-T on 9/28/18\par \par Tolerating chemotherapy reasonably well. Has ongoing fatigue which is not unexpected.  Continues to work part time. \par \par Plan:\par Proceed with week 1 Taxol today\par Continue ambien for insomnia\par Follow up in 2 weeks\par

## 2018-11-23 NOTE — ADDENDUM
[FreeTextEntry1] : I, Raysa Magallanes, acted solely as a scribe for Dr. Olimpia Narvaez on this date 11/23/18.

## 2018-11-23 NOTE — HISTORY OF PRESENT ILLNESS
[Disease: _____________________] : Disease: [unfilled] [T: ___] : T[unfilled] [de-identified] : Ms. 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   \par \par At initial presentation, she noted L nipple inversion early August 2018 and was sent for a diagnostic mammo and sonogram by her PCP. In addition, she also had noted a left breast mass ~ 8 months ago, and began to notice more pain at the site. She has not gone for screening mammo in a few years. In the last year, she has lost both her parents, and w/ other family issues, neglected the left breast mass. \par \par Mammogram on 8/27/18 demonstrated a 3.0 cm spiculated mass in the left breast retroareolar region overlying skin retraction and nipple inversion, highly suspicious for malignancy.  US breast showed left breast retroareolar 2.5 x 2 x 3.5 cm irregular hypoechoic solid shadowing mass with ill defined borders corresponding with spiculated mass demonstrated mammographically with morphologic features highly suspicious for malignancy as well as 1.3 x 0.5 x 1.0 cm cystic cluster at 3:00 7cmfn. \par \par She saw Dr. Riggs on 8/28/18 and US guided core biopsy was performed on 8/30/18.  \par \par 8/30/18 Pathology\par 1. Left breast, retroareolar, ultrasound guided core biopsy - poorly differentiated IDC with micropapillary features. ER 90%, AK 95%, HER2 negative.\par - Steven score 8/9 (3 + 3 + 2) in this limited material.\par  - Invasive tumor measures at least 1.3 cm\par   2. Left breast, 3:00, 6 cm fn, - poorly differentiated IDC  with  micropapillary features. ER>90% AK>90%, HER2 negative\par - [place] score 8/9 \par \par 9/14/18 MRI breast - LEFT BREAST: *  An irregularly shaped enhancing mass in the left retroareolar region   is compatible with a site of biopsy-proven malignancy. It measures  approximately 2.4 x 2.1 x 2.2 centimeters (AP by TV by CC). There is  involvement of the nipple, with retraction. Susceptibility artifact from  the biopsy marker is noted in the center of the mass. *  In the 3:00 axis, middle third, there is an irregularly shaped  enhancing mass with surrounding non mass enhancement. This is compatible  with the additional site of biopsy-proven malignancy. Overall, the  abnormal enhancement measures 6.7 x 2.8 x 2.1 centimeters (AP x TV x CC).  The AP extent of the associated non mass enhancement measures  significantly larger than depicted on ultrasound, and extends towards the  site of malignancy in the retroareolar region.\par \par \par Family hx: maternal GM breast cancer (early 50's), maternal 1st cousin (late 50's), 1 maternal aunts ovarian, another maternal aunt w/ 2 unknown cancers. Father w/ prostate cancer. 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 No illicit drug use.\par \par PMH: Hashimoto's (53 y/o), was on thyroid medication in the past, no longer, HTN, arthritis, pre-diabetic, reflux (takes an occasional Zantac, and ventral hernia. Claustrophobia w/ MRI, situational anxiety/depression.\par \par Past sx hx: polyp removal (was told benign), cholecystomy, L knee sx. \par \par Health Care Providers:\par PCP: Dr. Rashard Posada ([place])\par \par Treatment summary:\par 9/28/18 C1 dose dense AC \par 10/12/18 C2 dose dense AC \par 10/26/18 C3 dose dense AC \par 11/9/18 C4 dose dense AC \par 11/23/18 week 1 Taxol [de-identified] : poorly differentiated IDC [de-identified] : Patient presents for week 1 Taxol fito.\par  She complains of intermittent nausea, denies any vomiting. Has more fatigue with last cycle of dose dense AC.  She has been sleeping better since beginning Ambien. Denies any mouth sores, however overall mouth sensitivity has increased. No fever.  Her fingertips feel sore. Denies any diarrhea or constipation. Appetite is okay, weight is stable. Intermittent breast pain, denies any nipple discharge. Working part time.

## 2018-11-23 NOTE — PHYSICAL EXAM
[Restricted in physically strenuous activity but ambulatory and able to carry out work of a light or sedentary nature] : Status 1- Restricted in physically strenuous activity but ambulatory and able to carry out work of a light or sedentary nature, e.g., light house work, office work [Normal] : affect appropriate [Obese] : obese [de-identified] : supple [de-identified] : clear b/l [de-identified] : S1/S2 [de-identified] : negative pedal edema or calve tenderness [de-identified] : soft, obese [de-identified] : no cervical or supraclavicular adenopathy

## 2018-11-29 ENCOUNTER — APPOINTMENT (OUTPATIENT)
Dept: HEMATOLOGY ONCOLOGY | Facility: CLINIC | Age: 58
End: 2018-11-29
Payer: COMMERCIAL

## 2018-11-29 ENCOUNTER — RESULT REVIEW (OUTPATIENT)
Age: 58
End: 2018-11-29

## 2018-11-29 ENCOUNTER — LABORATORY RESULT (OUTPATIENT)
Age: 58
End: 2018-11-29

## 2018-11-29 ENCOUNTER — APPOINTMENT (OUTPATIENT)
Age: 58
End: 2018-11-29

## 2018-11-29 VITALS
DIASTOLIC BLOOD PRESSURE: 70 MMHG | BODY MASS INDEX: 38.6 KG/M2 | TEMPERATURE: 98.3 F | WEIGHT: 231.7 LBS | HEART RATE: 120 BPM | HEIGHT: 65 IN | OXYGEN SATURATION: 94 % | SYSTOLIC BLOOD PRESSURE: 102 MMHG

## 2018-11-29 LAB
ANISOCYTOSIS BLD QL: SLIGHT — SIGNIFICANT CHANGE UP
BASOPHILS # BLD AUTO: 0.1 K/UL — SIGNIFICANT CHANGE UP (ref 0–0.2)
BASOPHILS NFR BLD AUTO: 1 % — SIGNIFICANT CHANGE UP (ref 0–2)
EOSINOPHIL # BLD AUTO: 0.1 K/UL — SIGNIFICANT CHANGE UP (ref 0–0.5)
HCT VFR BLD CALC: 40 % — SIGNIFICANT CHANGE UP (ref 34.5–45)
HGB BLD-MCNC: 13.5 G/DL — SIGNIFICANT CHANGE UP (ref 11.5–15.5)
LG PLATELETS BLD QL AUTO: SLIGHT — SIGNIFICANT CHANGE UP
LYMPHOCYTES # BLD AUTO: 0.8 K/UL — LOW (ref 1–3.3)
LYMPHOCYTES # BLD AUTO: 16 % — SIGNIFICANT CHANGE UP (ref 13–44)
MACROCYTES BLD QL: SLIGHT — SIGNIFICANT CHANGE UP
MCHC RBC-ENTMCNC: 30.2 PG — SIGNIFICANT CHANGE UP (ref 27–34)
MCHC RBC-ENTMCNC: 33.8 GM/DL — SIGNIFICANT CHANGE UP (ref 32–36)
MCV RBC AUTO: 89.4 FL — SIGNIFICANT CHANGE UP (ref 80–100)
MICROCYTES BLD QL: SLIGHT — SIGNIFICANT CHANGE UP
MONOCYTES # BLD AUTO: 0.3 K/UL — SIGNIFICANT CHANGE UP (ref 0–0.9)
MONOCYTES NFR BLD AUTO: 5 % — SIGNIFICANT CHANGE UP (ref 2–14)
NEUTROPHILS # BLD AUTO: 3.1 K/UL — SIGNIFICANT CHANGE UP (ref 1.8–7.4)
NEUTROPHILS NFR BLD AUTO: 77 % — SIGNIFICANT CHANGE UP (ref 43–77)
NEUTS BAND # BLD: 1 % — SIGNIFICANT CHANGE UP (ref 0–8)
PLAT MORPH BLD: NORMAL — SIGNIFICANT CHANGE UP
PLATELET # BLD AUTO: 287 K/UL — SIGNIFICANT CHANGE UP (ref 150–400)
POIKILOCYTOSIS BLD QL AUTO: SLIGHT — SIGNIFICANT CHANGE UP
RBC # BLD: 4.47 M/UL — SIGNIFICANT CHANGE UP (ref 3.8–5.2)
RBC # FLD: 15.4 % — HIGH (ref 10.3–14.5)
RBC BLD AUTO: SIGNIFICANT CHANGE UP
WBC # BLD: 4.3 K/UL — SIGNIFICANT CHANGE UP (ref 3.8–10.5)
WBC # FLD AUTO: 4.3 K/UL — SIGNIFICANT CHANGE UP (ref 3.8–10.5)

## 2018-11-29 PROCEDURE — 99214 OFFICE O/P EST MOD 30 MIN: CPT

## 2018-11-29 PROCEDURE — 93010 ELECTROCARDIOGRAM REPORT: CPT

## 2018-12-03 ENCOUNTER — RX RENEWAL (OUTPATIENT)
Age: 58
End: 2018-12-03

## 2018-12-05 NOTE — ASSESSMENT
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  Breast MRI with multicentric disease in left breast, largest abnormal enhancement measures 6.7 cm and extends towards 2.4 cm retroareolar mass.  No axillary adenopathy. ECHO 9/28/18 65.1% \par Began neoadjuvant chemotherapy with dose dense AC-T on 9/28/18\par \par S/p week 1 Taxol. C/o painful finger tips, and resolved numbness to feet. Continues to work part time. \par \par Plan:\par - Proceed with week 2 Taxol today\par - Tachy rate: EKG done--> sinus tachycardia. BP lower than usual, no c/o CP or palpitations. Dr. Narvaez aware - Ordered 1L NS w/ tx - tachy rate may be r/t decreased hydration. Encouraged increased fluid intake.\par - Continue ambien for insomnia\par - Peripheral neuropathy - rx Vit B6 and Alpha lipoic acid.\par - Follow up in 2 weeks\par

## 2018-12-05 NOTE — PHYSICAL EXAM
[Restricted in physically strenuous activity but ambulatory and able to carry out work of a light or sedentary nature] : Status 1- Restricted in physically strenuous activity but ambulatory and able to carry out work of a light or sedentary nature, e.g., light house work, office work [Obese] : obese [Normal] : affect appropriate [de-identified] : negative cervical, supra/infraclavicular adenopathy. [de-identified] : clear to auscultation and percussion. [de-identified] : S1/S2 tachy rate. [de-identified] : negative pedal edema or calve tenderness [de-identified] : BS+, soft, nontender on palpation. [de-identified] : negative axillary adenopathy.

## 2018-12-07 ENCOUNTER — APPOINTMENT (OUTPATIENT)
Dept: HEMATOLOGY ONCOLOGY | Facility: CLINIC | Age: 58
End: 2018-12-07
Payer: COMMERCIAL

## 2018-12-07 ENCOUNTER — APPOINTMENT (OUTPATIENT)
Age: 58
End: 2018-12-07

## 2018-12-07 ENCOUNTER — LABORATORY RESULT (OUTPATIENT)
Age: 58
End: 2018-12-07

## 2018-12-07 ENCOUNTER — RESULT REVIEW (OUTPATIENT)
Age: 58
End: 2018-12-07

## 2018-12-07 DIAGNOSIS — A04.72 ENTEROCOLITIS DUE TO CLOSTRIDIUM DIFFICILE, NOT SPECIFIED AS RECURRENT: ICD-10-CM

## 2018-12-07 LAB
BASOPHILS # BLD AUTO: 0 K/UL — SIGNIFICANT CHANGE UP (ref 0–0.2)
BASOPHILS NFR BLD AUTO: 0.8 % — SIGNIFICANT CHANGE UP (ref 0–2)
EOSINOPHIL # BLD AUTO: 0.1 K/UL — SIGNIFICANT CHANGE UP (ref 0–0.5)
EOSINOPHIL NFR BLD AUTO: 2.4 % — SIGNIFICANT CHANGE UP (ref 0–6)
HCT VFR BLD CALC: 35.7 % — SIGNIFICANT CHANGE UP (ref 34.5–45)
HGB BLD-MCNC: 11.7 G/DL — SIGNIFICANT CHANGE UP (ref 11.5–15.5)
LYMPHOCYTES # BLD AUTO: 1.2 K/UL — SIGNIFICANT CHANGE UP (ref 1–3.3)
LYMPHOCYTES # BLD AUTO: 34.1 % — SIGNIFICANT CHANGE UP (ref 13–44)
MCHC RBC-ENTMCNC: 29.9 PG — SIGNIFICANT CHANGE UP (ref 27–34)
MCHC RBC-ENTMCNC: 32.8 GM/DL — SIGNIFICANT CHANGE UP (ref 32–36)
MCV RBC AUTO: 91.1 FL — SIGNIFICANT CHANGE UP (ref 80–100)
MONOCYTES # BLD AUTO: 0.3 K/UL — SIGNIFICANT CHANGE UP (ref 0–0.9)
MONOCYTES NFR BLD AUTO: 7.9 % — SIGNIFICANT CHANGE UP (ref 2–14)
NEUTROPHILS # BLD AUTO: 1.9 K/UL — SIGNIFICANT CHANGE UP (ref 1.8–7.4)
NEUTROPHILS NFR BLD AUTO: 54.7 % — SIGNIFICANT CHANGE UP (ref 43–77)
PLATELET # BLD AUTO: 242 K/UL — SIGNIFICANT CHANGE UP (ref 150–400)
RBC # BLD: 3.92 M/UL — SIGNIFICANT CHANGE UP (ref 3.8–5.2)
RBC # FLD: 16.2 % — HIGH (ref 10.3–14.5)
WBC # BLD: 3.5 K/UL — LOW (ref 3.8–10.5)
WBC # FLD AUTO: 3.5 K/UL — LOW (ref 3.8–10.5)

## 2018-12-07 PROCEDURE — 99214 OFFICE O/P EST MOD 30 MIN: CPT

## 2018-12-11 ENCOUNTER — OUTPATIENT (OUTPATIENT)
Dept: OUTPATIENT SERVICES | Facility: HOSPITAL | Age: 58
LOS: 1 days | Discharge: HOME | End: 2018-12-11

## 2018-12-11 DIAGNOSIS — C50.919 MALIGNANT NEOPLASM OF UNSPECIFIED SITE OF UNSPECIFIED FEMALE BREAST: ICD-10-CM

## 2018-12-12 ENCOUNTER — RX RENEWAL (OUTPATIENT)
Age: 58
End: 2018-12-12

## 2018-12-13 PROBLEM — A04.72 C. DIFFICILE DIARRHEA: Status: RESOLVED | Noted: 2018-10-15 | Resolved: 2018-12-13

## 2018-12-13 RX ORDER — VANCOMYCIN HYDROCHLORIDE 125 MG/1
125 CAPSULE ORAL 4 TIMES DAILY
Qty: 40 | Refills: 0 | Status: DISCONTINUED | COMMUNITY
Start: 2018-10-15 | End: 2018-12-13

## 2018-12-14 ENCOUNTER — LABORATORY RESULT (OUTPATIENT)
Age: 58
End: 2018-12-14

## 2018-12-14 ENCOUNTER — RESULT REVIEW (OUTPATIENT)
Age: 58
End: 2018-12-14

## 2018-12-14 ENCOUNTER — APPOINTMENT (OUTPATIENT)
Age: 58
End: 2018-12-14

## 2018-12-14 LAB
BASOPHILS # BLD AUTO: 0 K/UL — SIGNIFICANT CHANGE UP (ref 0–0.2)
BASOPHILS NFR BLD AUTO: 1.3 % — SIGNIFICANT CHANGE UP (ref 0–2)
EOSINOPHIL # BLD AUTO: 0.1 K/UL — SIGNIFICANT CHANGE UP (ref 0–0.5)
EOSINOPHIL NFR BLD AUTO: 4.1 % — SIGNIFICANT CHANGE UP (ref 0–6)
HCT VFR BLD CALC: 34.6 % — SIGNIFICANT CHANGE UP (ref 34.5–45)
HGB BLD-MCNC: 11.9 G/DL — SIGNIFICANT CHANGE UP (ref 11.5–15.5)
LYMPHOCYTES # BLD AUTO: 1 K/UL — SIGNIFICANT CHANGE UP (ref 1–3.3)
LYMPHOCYTES # BLD AUTO: 31.2 % — SIGNIFICANT CHANGE UP (ref 13–44)
MCHC RBC-ENTMCNC: 31.5 PG — SIGNIFICANT CHANGE UP (ref 27–34)
MCHC RBC-ENTMCNC: 34.5 GM/DL — SIGNIFICANT CHANGE UP (ref 32–36)
MCV RBC AUTO: 91.4 FL — SIGNIFICANT CHANGE UP (ref 80–100)
MONOCYTES # BLD AUTO: 0.2 K/UL — SIGNIFICANT CHANGE UP (ref 0–0.9)
MONOCYTES NFR BLD AUTO: 5.6 % — SIGNIFICANT CHANGE UP (ref 2–14)
NEUTROPHILS # BLD AUTO: 1.8 K/UL — SIGNIFICANT CHANGE UP (ref 1.8–7.4)
NEUTROPHILS NFR BLD AUTO: 57.8 % — SIGNIFICANT CHANGE UP (ref 43–77)
PLATELET # BLD AUTO: 215 K/UL — SIGNIFICANT CHANGE UP (ref 150–400)
RBC # BLD: 3.78 M/UL — LOW (ref 3.8–5.2)
RBC # FLD: 15.7 % — HIGH (ref 10.3–14.5)
WBC # BLD: 3.2 K/UL — LOW (ref 3.8–10.5)
WBC # FLD AUTO: 3.2 K/UL — LOW (ref 3.8–10.5)

## 2018-12-14 NOTE — ASSESSMENT
[FreeTextEntry1] : 57 y/o postmenopausal female with locally advanced poorly differentiated left breast IDC,  ER>90%, TX>90%, HER2 negative.  Breast MRI with multicentric disease in left breast, largest abnormal enhancement measures 6.7 cm and extends towards 2.4 cm retroareolar mass.  No axillary adenopathy. ECHO 9/28/18 65.1% \par Began neoadjuvant chemotherapy with dose dense AC-T on 9/28/18\par \par S/p week 1 Taxol. C/o painful finger tips, and resolved numbness to feet. Continues to work part time. \par \par Plan:\par - Proceed with week 3 Taxol today\par - Continue ambien for insomnia\par - Peripheral neuropathy - rx Vit B6 and Alpha lipoic acid.\par - Follow up in 2 weeks\par

## 2018-12-14 NOTE — HISTORY OF PRESENT ILLNESS
[Disease: _____________________] : Disease: [unfilled] [T: ___] : T[unfilled] [de-identified] : Ms. KRISTIE GARZON, is a postmenopausal female who was diagnosed with poorly differentiated invasive ductal carcinoma of left breast cancer, ER >90% OK>90%, HER2 negative.   \par \par At initial presentation, she noted L nipple inversion early August 2018 and was sent for a diagnostic mammo and sonogram by her PCP. In addition, she also had noted a left breast mass ~ 8 months ago, and began to notice more pain at the site. She has not gone for screening mammo in a few years. In the last year, she has lost both her parents, and w/ other family issues, neglected the left breast mass. \par \par Mammogram on 8/27/18 demonstrated a 3.0 cm spiculated mass in the left breast retroareolar region overlying skin retraction and nipple inversion, highly suspicious for malignancy.  US breast showed left breast retroareolar 2.5 x 2 x 3.5 cm irregular hypoechoic solid shadowing mass with ill defined borders corresponding with spiculated mass demonstrated mammographically with morphologic features highly suspicious for malignancy as well as 1.3 x 0.5 x 1.0 cm cystic cluster at 3:00 7cmfn. \par \par She saw Dr. Riggs on 8/28/18 and US guided core biopsy was performed on 8/30/18.  \par \par 8/30/18 Pathology\par 1. Left breast, retroareolar, ultrasound guided core biopsy - poorly differentiated IDC with micropapillary features. ER 90%, OK 95%, HER2 negative.\par - Steven score 8/9 (3 + 3 + 2) in this limited material.\par  - Invasive tumor measures at least 1.3 cm\par   2. Left breast, 3:00, 6 cm fn, - poorly differentiated IDC  with  micropapillary features. ER>90% OK>90%, HER2 negative\par - [place] score 8/9 \par \par 9/14/18 MRI breast - LEFT BREAST: *  An irregularly shaped enhancing mass in the left retroareolar region   is compatible with a site of biopsy-proven malignancy. It measures  approximately 2.4 x 2.1 x 2.2 centimeters (AP by TV by CC). There is  involvement of the nipple, with retraction. Susceptibility artifact from  the biopsy marker is noted in the center of the mass. *  In the 3:00 axis, middle third, there is an irregularly shaped  enhancing mass with surrounding non mass enhancement. This is compatible  with the additional site of biopsy-proven malignancy. Overall, the  abnormal enhancement measures 6.7 x 2.8 x 2.1 centimeters (AP x TV x CC).  The AP extent of the associated non mass enhancement measures  significantly larger than depicted on ultrasound, and extends towards the  site of malignancy in the retroareolar region.\par \par \par Family hx: maternal GM breast cancer (early 50's), maternal 1st cousin (late 50's), 1 maternal aunts ovarian, another maternal aunt w/ 2 unknown cancers. Father w/ prostate cancer. 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 No illicit drug use.\par \par PMH: Hashimoto's (55 y/o), was on thyroid medication in the past, no longer, HTN, arthritis, pre-diabetic, reflux (takes an occasional Zantac, and ventral hernia. Claustrophobia w/ MRI, situational anxiety/depression.\par \par Past sx hx: polyp removal (was told benign), cholecystomy, L knee sx. \par \par Health Care Providers:\par PCP: Dr. Rashard Posada ([place])\par \par Treatment summary:\par 9/28/18 C1 dose dense AC \par 10/12/18 C2 dose dense AC \par 10/26/18 C3 dose dense AC \par 11/9/18 C4 dose dense AC \par 11/23/18 week 1 Taxol [de-identified] : poorly differentiated IDC [de-identified] : Here for C3 Taxol. Drinking four 20 oz. water, plus coffee Was placed on Losartan plus HCTZ- she is not sure of the dosage. She is voiding more and is not testing her BP at home. Denies fevers, notes mild SOB before coming in for infusion today and noted a light palpation. No CP, appetite "normal". Has variant diarrhea post MiraLAX for constipation. LBM 2 days ago. No dysuria, No swelling in LE's. Energy level is poor to fair. She worked 2 days this week. She is taking Ambien 3-4 days a week. Is on Alpha lipoic acid and B6 for early c/o peripheral neuropathy post Taxol--> not interfering w/ ADL's. Remainder of ROS is negative.

## 2018-12-14 NOTE — PHYSICAL EXAM
[Restricted in physically strenuous activity but ambulatory and able to carry out work of a light or sedentary nature] : Status 1- Restricted in physically strenuous activity but ambulatory and able to carry out work of a light or sedentary nature, e.g., light house work, office work [Obese] : obese [Normal] : grossly intact [de-identified] : negative cervical, supra/infraclavicular adenopathy. [de-identified] : clear to auscultation and percussion. [de-identified] : S1/S2 sl tachy rate. [de-identified] : negative pedal edema or calve tenderness [de-identified] : BS+, soft, nontender on palpation. [de-identified] : negative axillary adenopathy. [de-identified] : without spinal or cva tenderness.

## 2018-12-17 DIAGNOSIS — Z51.11 ENCOUNTER FOR ANTINEOPLASTIC CHEMOTHERAPY: ICD-10-CM

## 2018-12-17 DIAGNOSIS — R11.2 NAUSEA WITH VOMITING, UNSPECIFIED: ICD-10-CM

## 2018-12-19 ENCOUNTER — RX RENEWAL (OUTPATIENT)
Age: 58
End: 2018-12-19

## 2018-12-19 DIAGNOSIS — G47.00 INSOMNIA, UNSPECIFIED: ICD-10-CM

## 2018-12-21 ENCOUNTER — RESULT REVIEW (OUTPATIENT)
Age: 58
End: 2018-12-21

## 2018-12-21 ENCOUNTER — APPOINTMENT (OUTPATIENT)
Age: 58
End: 2018-12-21

## 2018-12-21 ENCOUNTER — LABORATORY RESULT (OUTPATIENT)
Age: 58
End: 2018-12-21

## 2018-12-21 LAB
BASOPHILS # BLD AUTO: 0 K/UL — SIGNIFICANT CHANGE UP (ref 0–0.2)
BASOPHILS NFR BLD AUTO: 0.8 % — SIGNIFICANT CHANGE UP (ref 0–2)
EOSINOPHIL # BLD AUTO: 0.1 K/UL — SIGNIFICANT CHANGE UP (ref 0–0.5)
EOSINOPHIL NFR BLD AUTO: 3.5 % — SIGNIFICANT CHANGE UP (ref 0–6)
HCT VFR BLD CALC: 35.1 % — SIGNIFICANT CHANGE UP (ref 34.5–45)
HGB BLD-MCNC: 12 G/DL — SIGNIFICANT CHANGE UP (ref 11.5–15.5)
LYMPHOCYTES # BLD AUTO: 1 K/UL — SIGNIFICANT CHANGE UP (ref 1–3.3)
LYMPHOCYTES # BLD AUTO: 30.7 % — SIGNIFICANT CHANGE UP (ref 13–44)
MCHC RBC-ENTMCNC: 31.6 PG — SIGNIFICANT CHANGE UP (ref 27–34)
MCHC RBC-ENTMCNC: 34 GM/DL — SIGNIFICANT CHANGE UP (ref 32–36)
MCV RBC AUTO: 93 FL — SIGNIFICANT CHANGE UP (ref 80–100)
MONOCYTES # BLD AUTO: 0.1 K/UL — SIGNIFICANT CHANGE UP (ref 0–0.9)
MONOCYTES NFR BLD AUTO: 4.6 % — SIGNIFICANT CHANGE UP (ref 2–14)
NEUTROPHILS # BLD AUTO: 1.9 K/UL — SIGNIFICANT CHANGE UP (ref 1.8–7.4)
NEUTROPHILS NFR BLD AUTO: 60.4 % — SIGNIFICANT CHANGE UP (ref 43–77)
PLATELET # BLD AUTO: 231 K/UL — SIGNIFICANT CHANGE UP (ref 150–400)
RBC # BLD: 3.78 M/UL — LOW (ref 3.8–5.2)
RBC # FLD: 16 % — HIGH (ref 10.3–14.5)
WBC # BLD: 3.1 K/UL — LOW (ref 3.8–10.5)
WBC # FLD AUTO: 3.1 K/UL — LOW (ref 3.8–10.5)

## 2018-12-28 ENCOUNTER — RESULT REVIEW (OUTPATIENT)
Age: 58
End: 2018-12-28

## 2018-12-28 ENCOUNTER — LABORATORY RESULT (OUTPATIENT)
Age: 58
End: 2018-12-28

## 2018-12-28 ENCOUNTER — APPOINTMENT (OUTPATIENT)
Age: 58
End: 2018-12-28

## 2018-12-28 LAB
ANISOCYTOSIS BLD QL: SLIGHT — SIGNIFICANT CHANGE UP
BASO STIPL BLD QL SMEAR: PRESENT — SIGNIFICANT CHANGE UP
BASOPHILS # BLD AUTO: 0.1 K/UL — SIGNIFICANT CHANGE UP (ref 0–0.2)
BASOPHILS NFR BLD AUTO: 4 % — HIGH (ref 0–2)
EOSINOPHIL # BLD AUTO: 0 K/UL — SIGNIFICANT CHANGE UP (ref 0–0.5)
HCT VFR BLD CALC: 32.6 % — LOW (ref 34.5–45)
HGB BLD-MCNC: 11.4 G/DL — LOW (ref 11.5–15.5)
LG PLATELETS BLD QL AUTO: SLIGHT — SIGNIFICANT CHANGE UP
LYMPHOCYTES # BLD AUTO: 0.9 K/UL — LOW (ref 1–3.3)
LYMPHOCYTES # BLD AUTO: 30 % — SIGNIFICANT CHANGE UP (ref 13–44)
MACROCYTES BLD QL: SLIGHT — SIGNIFICANT CHANGE UP
MCHC RBC-ENTMCNC: 32.8 PG — SIGNIFICANT CHANGE UP (ref 27–34)
MCHC RBC-ENTMCNC: 34.8 GM/DL — SIGNIFICANT CHANGE UP (ref 32–36)
MCV RBC AUTO: 94.1 FL — SIGNIFICANT CHANGE UP (ref 80–100)
METAMYELOCYTES # FLD: 1 % — HIGH (ref 0–0)
MICROCYTES BLD QL: SLIGHT — SIGNIFICANT CHANGE UP
MONOCYTES # BLD AUTO: 0.2 K/UL — SIGNIFICANT CHANGE UP (ref 0–0.9)
MONOCYTES NFR BLD AUTO: 5 % — SIGNIFICANT CHANGE UP (ref 2–14)
NEUTROPHILS # BLD AUTO: 1.7 K/UL — LOW (ref 1.8–7.4)
NEUTROPHILS NFR BLD AUTO: 60 % — SIGNIFICANT CHANGE UP (ref 43–77)
PLAT MORPH BLD: NORMAL — SIGNIFICANT CHANGE UP
PLATELET # BLD AUTO: 221 K/UL — SIGNIFICANT CHANGE UP (ref 150–400)
POIKILOCYTOSIS BLD QL AUTO: SLIGHT — SIGNIFICANT CHANGE UP
POLYCHROMASIA BLD QL SMEAR: SLIGHT — SIGNIFICANT CHANGE UP
RBC # BLD: 3.46 M/UL — LOW (ref 3.8–5.2)
RBC # FLD: 16.2 % — HIGH (ref 10.3–14.5)
RBC BLD AUTO: SIGNIFICANT CHANGE UP
WBC # BLD: 2.9 K/UL — LOW (ref 3.8–10.5)
WBC # FLD AUTO: 2.9 K/UL — LOW (ref 3.8–10.5)

## 2019-01-04 ENCOUNTER — RESULT REVIEW (OUTPATIENT)
Age: 59
End: 2019-01-04

## 2019-01-04 ENCOUNTER — LABORATORY RESULT (OUTPATIENT)
Age: 59
End: 2019-01-04

## 2019-01-04 ENCOUNTER — APPOINTMENT (OUTPATIENT)
Age: 59
End: 2019-01-04

## 2019-01-04 LAB
BASOPHILS # BLD AUTO: 0 K/UL — SIGNIFICANT CHANGE UP (ref 0–0.2)
BASOPHILS NFR BLD AUTO: 1.5 % — SIGNIFICANT CHANGE UP (ref 0–2)
EOSINOPHIL # BLD AUTO: 0.1 K/UL — SIGNIFICANT CHANGE UP (ref 0–0.5)
EOSINOPHIL NFR BLD AUTO: 3.3 % — SIGNIFICANT CHANGE UP (ref 0–6)
HCT VFR BLD CALC: 36.4 % — SIGNIFICANT CHANGE UP (ref 34.5–45)
HGB BLD-MCNC: 12.4 G/DL — SIGNIFICANT CHANGE UP (ref 11.5–15.5)
LYMPHOCYTES # BLD AUTO: 0.8 K/UL — LOW (ref 1–3.3)
LYMPHOCYTES # BLD AUTO: 29.3 % — SIGNIFICANT CHANGE UP (ref 13–44)
MCHC RBC-ENTMCNC: 32.3 PG — SIGNIFICANT CHANGE UP (ref 27–34)
MCHC RBC-ENTMCNC: 34.2 GM/DL — SIGNIFICANT CHANGE UP (ref 32–36)
MCV RBC AUTO: 94.5 FL — SIGNIFICANT CHANGE UP (ref 80–100)
MONOCYTES # BLD AUTO: 0.2 K/UL — SIGNIFICANT CHANGE UP (ref 0–0.9)
MONOCYTES NFR BLD AUTO: 6.1 % — SIGNIFICANT CHANGE UP (ref 2–14)
NEUTROPHILS # BLD AUTO: 1.7 K/UL — LOW (ref 1.8–7.4)
NEUTROPHILS NFR BLD AUTO: 59.8 % — SIGNIFICANT CHANGE UP (ref 43–77)
PLATELET # BLD AUTO: 306 K/UL — SIGNIFICANT CHANGE UP (ref 150–400)
RBC # BLD: 3.85 M/UL — SIGNIFICANT CHANGE UP (ref 3.8–5.2)
RBC # FLD: 15.8 % — HIGH (ref 10.3–14.5)
WBC # BLD: 2.9 K/UL — LOW (ref 3.8–10.5)
WBC # FLD AUTO: 2.9 K/UL — LOW (ref 3.8–10.5)

## 2019-01-10 ENCOUNTER — RESULT REVIEW (OUTPATIENT)
Age: 59
End: 2019-01-10

## 2019-01-10 ENCOUNTER — APPOINTMENT (OUTPATIENT)
Dept: HEMATOLOGY ONCOLOGY | Facility: CLINIC | Age: 59
End: 2019-01-10
Payer: COMMERCIAL

## 2019-01-10 VITALS
TEMPERATURE: 98.2 F | HEART RATE: 111 BPM | WEIGHT: 235 LBS | OXYGEN SATURATION: 97 % | HEIGHT: 65 IN | BODY MASS INDEX: 39.15 KG/M2 | DIASTOLIC BLOOD PRESSURE: 70 MMHG | SYSTOLIC BLOOD PRESSURE: 102 MMHG

## 2019-01-10 LAB
BASOPHILS # BLD AUTO: 0.1 K/UL — SIGNIFICANT CHANGE UP (ref 0–0.2)
BASOPHILS NFR BLD AUTO: 2.6 % — HIGH (ref 0–2)
EOSINOPHIL # BLD AUTO: 0.1 K/UL — SIGNIFICANT CHANGE UP (ref 0–0.5)
EOSINOPHIL NFR BLD AUTO: 2.9 % — SIGNIFICANT CHANGE UP (ref 0–6)
HCT VFR BLD CALC: 33.5 % — LOW (ref 34.5–45)
HGB BLD-MCNC: 11.2 G/DL — LOW (ref 11.5–15.5)
LYMPHOCYTES # BLD AUTO: 0.7 K/UL — LOW (ref 1–3.3)
LYMPHOCYTES # BLD AUTO: 23.7 % — SIGNIFICANT CHANGE UP (ref 13–44)
MCHC RBC-ENTMCNC: 32.5 PG — SIGNIFICANT CHANGE UP (ref 27–34)
MCHC RBC-ENTMCNC: 33.4 GM/DL — SIGNIFICANT CHANGE UP (ref 32–36)
MCV RBC AUTO: 97.2 FL — SIGNIFICANT CHANGE UP (ref 80–100)
MONOCYTES # BLD AUTO: 0.2 K/UL — SIGNIFICANT CHANGE UP (ref 0–0.9)
MONOCYTES NFR BLD AUTO: 6.5 % — SIGNIFICANT CHANGE UP (ref 2–14)
NEUTROPHILS # BLD AUTO: 1.8 K/UL — SIGNIFICANT CHANGE UP (ref 1.8–7.4)
NEUTROPHILS NFR BLD AUTO: 64.3 % — SIGNIFICANT CHANGE UP (ref 43–77)
PLATELET # BLD AUTO: 262 K/UL — SIGNIFICANT CHANGE UP (ref 150–400)
RBC # BLD: 3.44 M/UL — LOW (ref 3.8–5.2)
RBC # FLD: 15.7 % — HIGH (ref 10.3–14.5)
WBC # BLD: 2.8 K/UL — LOW (ref 3.8–10.5)
WBC # FLD AUTO: 2.8 K/UL — LOW (ref 3.8–10.5)

## 2019-01-10 PROCEDURE — 99214 OFFICE O/P EST MOD 30 MIN: CPT

## 2019-01-10 RX ORDER — POTASSIUM CHLORIDE 1500 MG/1
20 TABLET, EXTENDED RELEASE ORAL
Qty: 4 | Refills: 0 | Status: DISCONTINUED | COMMUNITY
Start: 2019-01-02 | End: 2019-01-10

## 2019-01-10 RX ORDER — ZOLPIDEM TARTRATE 10 MG/1
10 TABLET ORAL
Qty: 30 | Refills: 1 | Status: DISCONTINUED | COMMUNITY
Start: 2018-11-09 | End: 2019-01-10

## 2019-01-10 NOTE — HISTORY OF PRESENT ILLNESS
[Disease: _____________________] : Disease: [unfilled] [T: ___] : T[unfilled] [de-identified] : Ms. KRISTIE GARZON, is a postmenopausal female who was diagnosed with poorly differentiated invasive ductal carcinoma of left breast cancer, ER >90% DE>90%, HER2 negative.   \par \par At initial presentation, she noted L nipple inversion early August 2018 and was sent for a diagnostic mammo and sonogram by her PCP. In addition, she also had noted a left breast mass ~ 8 months ago, and began to notice more pain at the site. She has not gone for screening mammo in a few years. In the last year, she has lost both her parents, and w/ other family issues, neglected the left breast mass. \par \par Mammogram on 8/27/18 demonstrated a 3.0 cm spiculated mass in the left breast retroareolar region overlying skin retraction and nipple inversion, highly suspicious for malignancy.  US breast showed left breast retroareolar 2.5 x 2 x 3.5 cm irregular hypoechoic solid shadowing mass with ill defined borders corresponding with spiculated mass demonstrated mammographically with morphologic features highly suspicious for malignancy as well as 1.3 x 0.5 x 1.0 cm cystic cluster at 3:00 7cmfn. \par \par She saw Dr. Riggs on 8/28/18 and US guided core biopsy was performed on 8/30/18.  \par \par 8/30/18 Pathology\par 1. Left breast, retroareolar, ultrasound guided core biopsy - poorly differentiated IDC with micropapillary features. ER 90%, DE 95%, HER2 negative.\par - Steven score 8/9 (3 + 3 + 2) in this limited material.\par  - Invasive tumor measures at least 1.3 cm\par   2. Left breast, 3:00, 6 cm fn, - poorly differentiated IDC  with  micropapillary features. ER>90% DE>90%, HER2 negative\par - [place] score 8/9 \par \par 9/14/18 MRI breast - LEFT BREAST: *  An irregularly shaped enhancing mass in the left retroareolar region   is compatible with a site of biopsy-proven malignancy. It measures  approximately 2.4 x 2.1 x 2.2 centimeters (AP by TV by CC). There is  involvement of the nipple, with retraction. Susceptibility artifact from  the biopsy marker is noted in the center of the mass. *  In the 3:00 axis, middle third, there is an irregularly shaped  enhancing mass with surrounding non mass enhancement. This is compatible  with the additional site of biopsy-proven malignancy. Overall, the  abnormal enhancement measures 6.7 x 2.8 x 2.1 centimeters (AP x TV x CC).  The AP extent of the associated non mass enhancement measures  significantly larger than depicted on ultrasound, and extends towards the  site of malignancy in the retroareolar region.\par \par \par Family hx: maternal GM breast cancer (early 50's), maternal 1st cousin (late 50's), 1 maternal aunts ovarian, another maternal aunt w/ 2 unknown cancers. Father w/ prostate cancer. 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 No illicit drug use.\par \par PMH: Hashimoto's (55 y/o), was on thyroid medication in the past, no longer, HTN, arthritis, pre-diabetic, reflux (takes an occasional Zantac, and ventral hernia. Claustrophobia w/ MRI, situational anxiety/depression.\par \par Past sx hx: polyp removal (was told benign), cholecystomy, L knee sx. \par \par Health Care Providers:\par PCP: Dr. Rashard Posada ([place])\par \par Treatment summary:\par 9/28/18 C1 dose dense AC \par 10/12/18 C2 dose dense AC \par 10/26/18 C3 dose dense AC \par 11/9/18 C4 dose dense AC \par 11/23/18 week 1 Taxol [de-identified] : poorly differentiated IDC [de-identified] : Due for C8 Taxol tomorrow.\par She feels cold all the time.\par No fevers. She feels very fatigued.\par Appetite is excellent. \par Reports mild numbness and tingling in her hands and feet, intermittent. \par Mild intermittent nausea. \par +swelling in feet\par +SOB

## 2019-01-10 NOTE — ASSESSMENT
[FreeTextEntry1] : 59 y/o postmenopausal female with locally advanced poorly differentiated left breast IDC,  ER>90%, [place]>90%, HER2 negative.  Breast MRI with multicentric disease in left breast, largest abnormal enhancement measures 6.7 cm and extends towards 2.4 cm retroareolar mass.  No axillary adenopathy. ECHO 9/28/18 65.1% \par Began neoadjuvant chemotherapy with dose dense AC-T on 9/28/18\par \par Week 8 Taxol.  Has grade 1-2 neuropathy. Otherwise doing well.  No leg swelling on physical exam.\par \par Plan:\par - Proceed week 8 Taxol tomorrow\par - Continue Ambien for insomnia\par - Peripheral neuropathy - Vit B6 and Alpha lipoic acid.\par -RTO 4 weeks

## 2019-01-10 NOTE — PHYSICAL EXAM
[Restricted in physically strenuous activity but ambulatory and able to carry out work of a light or sedentary nature] : Status 1- Restricted in physically strenuous activity but ambulatory and able to carry out work of a light or sedentary nature, e.g., light house work, office work [Obese] : obese [Normal] : affect appropriate [de-identified] : negative cervical, supra/infraclavicular adenopathy. [de-identified] : clear to auscultation and percussion. [de-identified] : S1/S2 sl tachy rate. [de-identified] : negative pedal edema or calve tenderness [de-identified] : retroareolar left breast mass is more difficult to appreciate, softer and more mobile.  [de-identified] : BS+, soft, nontender on palpation. [de-identified] : negative axillary adenopathy. [de-identified] : without spinal or cva tenderness.

## 2019-01-11 ENCOUNTER — OUTPATIENT (OUTPATIENT)
Dept: OUTPATIENT SERVICES | Facility: HOSPITAL | Age: 59
LOS: 1 days | Discharge: HOME | End: 2019-01-11

## 2019-01-11 ENCOUNTER — APPOINTMENT (OUTPATIENT)
Age: 59
End: 2019-01-11

## 2019-01-11 ENCOUNTER — LABORATORY RESULT (OUTPATIENT)
Age: 59
End: 2019-01-11

## 2019-01-11 DIAGNOSIS — C50.919 MALIGNANT NEOPLASM OF UNSPECIFIED SITE OF UNSPECIFIED FEMALE BREAST: ICD-10-CM

## 2019-01-14 DIAGNOSIS — Z51.11 ENCOUNTER FOR ANTINEOPLASTIC CHEMOTHERAPY: ICD-10-CM

## 2019-01-14 DIAGNOSIS — R11.2 NAUSEA WITH VOMITING, UNSPECIFIED: ICD-10-CM

## 2019-01-18 ENCOUNTER — RESULT REVIEW (OUTPATIENT)
Age: 59
End: 2019-01-18

## 2019-01-18 ENCOUNTER — APPOINTMENT (OUTPATIENT)
Age: 59
End: 2019-01-18

## 2019-01-18 ENCOUNTER — LABORATORY RESULT (OUTPATIENT)
Age: 59
End: 2019-01-18

## 2019-01-18 LAB
BASOPHILS # BLD AUTO: 0.1 K/UL — SIGNIFICANT CHANGE UP (ref 0–0.2)
BASOPHILS NFR BLD AUTO: 2.6 % — HIGH (ref 0–2)
EOSINOPHIL # BLD AUTO: 0.1 K/UL — SIGNIFICANT CHANGE UP (ref 0–0.5)
EOSINOPHIL NFR BLD AUTO: 2.1 % — SIGNIFICANT CHANGE UP (ref 0–6)
HCT VFR BLD CALC: 36 % — SIGNIFICANT CHANGE UP (ref 34.5–45)
HGB BLD-MCNC: 12 G/DL — SIGNIFICANT CHANGE UP (ref 11.5–15.5)
LYMPHOCYTES # BLD AUTO: 0.7 K/UL — LOW (ref 1–3.3)
LYMPHOCYTES # BLD AUTO: 26.7 % — SIGNIFICANT CHANGE UP (ref 13–44)
MCHC RBC-ENTMCNC: 32.9 PG — SIGNIFICANT CHANGE UP (ref 27–34)
MCHC RBC-ENTMCNC: 33.4 GM/DL — SIGNIFICANT CHANGE UP (ref 32–36)
MCV RBC AUTO: 98.4 FL — SIGNIFICANT CHANGE UP (ref 80–100)
MONOCYTES # BLD AUTO: 0.2 K/UL — SIGNIFICANT CHANGE UP (ref 0–0.9)
MONOCYTES NFR BLD AUTO: 6.1 % — SIGNIFICANT CHANGE UP (ref 2–14)
NEUTROPHILS # BLD AUTO: 1.7 K/UL — LOW (ref 1.8–7.4)
NEUTROPHILS NFR BLD AUTO: 62.6 % — SIGNIFICANT CHANGE UP (ref 43–77)
PLATELET # BLD AUTO: 283 K/UL — SIGNIFICANT CHANGE UP (ref 150–400)
RBC # BLD: 3.66 M/UL — LOW (ref 3.8–5.2)
RBC # FLD: 15.4 % — HIGH (ref 10.3–14.5)
WBC # BLD: 2.8 K/UL — LOW (ref 3.8–10.5)
WBC # FLD AUTO: 2.8 K/UL — LOW (ref 3.8–10.5)

## 2019-01-22 DIAGNOSIS — T82.598A OTHER MECHANICAL COMPLICATION OF OTHER CARDIAC AND VASCULAR DEVICES AND IMPLANTS, INITIAL ENCOUNTER: ICD-10-CM

## 2019-01-25 ENCOUNTER — LABORATORY RESULT (OUTPATIENT)
Age: 59
End: 2019-01-25

## 2019-01-25 ENCOUNTER — RESULT REVIEW (OUTPATIENT)
Age: 59
End: 2019-01-25

## 2019-01-25 ENCOUNTER — APPOINTMENT (OUTPATIENT)
Age: 59
End: 2019-01-25

## 2019-01-25 LAB
BASOPHILS # BLD AUTO: 0.1 K/UL — SIGNIFICANT CHANGE UP (ref 0–0.2)
BASOPHILS NFR BLD AUTO: 2 % — SIGNIFICANT CHANGE UP (ref 0–2)
EOSINOPHIL # BLD AUTO: 0.1 K/UL — SIGNIFICANT CHANGE UP (ref 0–0.5)
EOSINOPHIL NFR BLD AUTO: 2.9 % — SIGNIFICANT CHANGE UP (ref 0–6)
HCT VFR BLD CALC: 34.6 % — SIGNIFICANT CHANGE UP (ref 34.5–45)
HGB BLD-MCNC: 11.7 G/DL — SIGNIFICANT CHANGE UP (ref 11.5–15.5)
LYMPHOCYTES # BLD AUTO: 0.6 K/UL — LOW (ref 1–3.3)
LYMPHOCYTES # BLD AUTO: 21.7 % — SIGNIFICANT CHANGE UP (ref 13–44)
MCHC RBC-ENTMCNC: 32.7 PG — SIGNIFICANT CHANGE UP (ref 27–34)
MCHC RBC-ENTMCNC: 33.8 G/DL — SIGNIFICANT CHANGE UP (ref 32–36)
MCV RBC AUTO: 96.9 FL — SIGNIFICANT CHANGE UP (ref 80–100)
MONOCYTES # BLD AUTO: 0.3 K/UL — SIGNIFICANT CHANGE UP (ref 0–0.9)
MONOCYTES NFR BLD AUTO: 10.7 % — SIGNIFICANT CHANGE UP (ref 2–14)
NEUTROPHILS # BLD AUTO: 1.7 K/UL — LOW (ref 1.8–7.4)
NEUTROPHILS NFR BLD AUTO: 62.8 % — SIGNIFICANT CHANGE UP (ref 43–77)
PLATELET # BLD AUTO: 287 K/UL — SIGNIFICANT CHANGE UP (ref 150–400)
RBC # BLD: 3.57 M/UL — LOW (ref 3.8–5.2)
RBC # FLD: 14.8 % — HIGH (ref 10.3–14.5)
WBC # BLD: 2.7 K/UL — LOW (ref 3.8–10.5)
WBC # FLD AUTO: 2.7 K/UL — LOW (ref 3.8–10.5)

## 2019-01-28 ENCOUNTER — RESULT REVIEW (OUTPATIENT)
Age: 59
End: 2019-01-28

## 2019-01-28 DIAGNOSIS — E87.6 HYPOKALEMIA: ICD-10-CM

## 2019-01-29 ENCOUNTER — RX RENEWAL (OUTPATIENT)
Age: 59
End: 2019-01-29

## 2019-02-01 ENCOUNTER — LABORATORY RESULT (OUTPATIENT)
Age: 59
End: 2019-02-01

## 2019-02-01 ENCOUNTER — APPOINTMENT (OUTPATIENT)
Age: 59
End: 2019-02-01

## 2019-02-01 ENCOUNTER — RESULT REVIEW (OUTPATIENT)
Age: 59
End: 2019-02-01

## 2019-02-01 ENCOUNTER — APPOINTMENT (OUTPATIENT)
Dept: HEMATOLOGY ONCOLOGY | Facility: CLINIC | Age: 59
End: 2019-02-01
Payer: COMMERCIAL

## 2019-02-01 LAB
BASOPHILS # BLD AUTO: 0.1 K/UL — SIGNIFICANT CHANGE UP (ref 0–0.2)
BASOPHILS NFR BLD AUTO: 2.2 % — HIGH (ref 0–2)
EOSINOPHIL # BLD AUTO: 0.1 K/UL — SIGNIFICANT CHANGE UP (ref 0–0.5)
EOSINOPHIL NFR BLD AUTO: 1.8 % — SIGNIFICANT CHANGE UP (ref 0–6)
HCT VFR BLD CALC: 33.7 % — LOW (ref 34.5–45)
HGB BLD-MCNC: 11.2 G/DL — LOW (ref 11.5–15.5)
LYMPHOCYTES # BLD AUTO: 0.7 K/UL — LOW (ref 1–3.3)
LYMPHOCYTES # BLD AUTO: 21.8 % — SIGNIFICANT CHANGE UP (ref 13–44)
MCHC RBC-ENTMCNC: 33.4 G/DL — SIGNIFICANT CHANGE UP (ref 32–36)
MCHC RBC-ENTMCNC: 33.9 PG — SIGNIFICANT CHANGE UP (ref 27–34)
MCV RBC AUTO: 101.5 FL — HIGH (ref 80–100)
MONOCYTES # BLD AUTO: 0.4 K/UL — SIGNIFICANT CHANGE UP (ref 0–0.9)
MONOCYTES NFR BLD AUTO: 11.2 % — SIGNIFICANT CHANGE UP (ref 2–14)
NEUTROPHILS # BLD AUTO: 2.1 K/UL — SIGNIFICANT CHANGE UP (ref 1.8–7.4)
NEUTROPHILS NFR BLD AUTO: 63 % — SIGNIFICANT CHANGE UP (ref 43–77)
PLATELET # BLD AUTO: 320 K/UL — SIGNIFICANT CHANGE UP (ref 150–400)
RBC # BLD: 3.32 M/UL — LOW (ref 3.8–5.2)
RBC # FLD: 14.8 % — HIGH (ref 10.3–14.5)
WBC # BLD: 3.3 K/UL — LOW (ref 3.8–10.5)
WBC # FLD AUTO: 3.3 K/UL — LOW (ref 3.8–10.5)

## 2019-02-01 PROCEDURE — 99214 OFFICE O/P EST MOD 30 MIN: CPT

## 2019-02-01 NOTE — ASSESSMENT
[FreeTextEntry1] : 57 y/o postmenopausal female with locally advanced poorly differentiated left breast IDC,  ER>90%, GA>90%, HER2 negative.  Breast MRI with multicentric disease in left breast, largest abnormal enhancement measures 6.7 cm and extends towards 2.4 cm retroareolar mass.  No axillary adenopathy. ECHO 9/28/18 65.1% \par Began neoadjuvant chemotherapy with dose dense AC-T on 9/28/18\par \par Week 11 taxol today, has more persistent neuropathy. \par \par \par Plan:\par -will dose reduced today's week 11 taxol to 65 mg/m2 \par - Continue Ambien for insomnia\par - Peripheral neuropathy - Vit B6, Alpha lipoic acid, and 400 mg BID Gabapentin\par -She will follow-up with Dr. Riggs in March\par -She will follow-up in 4 weeks\par

## 2019-02-01 NOTE — HISTORY OF PRESENT ILLNESS
[Disease: _____________________] : Disease: [unfilled] [T: ___] : T[unfilled] [de-identified] : Ms. KRISTIE GARZON, is a postmenopausal female who was diagnosed with poorly differentiated invasive ductal carcinoma of left breast cancer, ER >90% CA>90%, HER2 negative.   \par \par At initial presentation, she noted L nipple inversion early August 2018 and was sent for a diagnostic mammo and sonogram by her PCP. In addition, she also had noted a left breast mass ~ 8 months ago, and began to notice more pain at the site. She has not gone for screening mammo in a few years. In the last year, she has lost both her parents, and w/ other family issues, neglected the left breast mass. \par \par Mammogram on 8/27/18 demonstrated a 3.0 cm spiculated mass in the left breast retroareolar region overlying skin retraction and nipple inversion, highly suspicious for malignancy.  US breast showed left breast retroareolar 2.5 x 2 x 3.5 cm irregular hypoechoic solid shadowing mass with ill defined borders corresponding with spiculated mass demonstrated mammographically with morphologic features highly suspicious for malignancy as well as 1.3 x 0.5 x 1.0 cm cystic cluster at 3:00 7cmfn. \par \par She saw Dr. Riggs on 8/28/18 and US guided core biopsy was performed on 8/30/18.  \par \par 8/30/18 Pathology\par 1. Left breast, retroareolar, ultrasound guided core biopsy - poorly differentiated IDC with micropapillary features. ER 90%, CA 95%, HER2 negative.\par - Steven score 8/9 (3 + 3 + 2) in this limited material.\par  - Invasive tumor measures at least 1.3 cm\par   2. Left breast, 3:00, 6 cm fn, - poorly differentiated IDC  with  micropapillary features. ER>90% CA>90%, HER2 negative\par - [place] score 8/9 \par \par 9/14/18 MRI breast - LEFT BREAST: *  An irregularly shaped enhancing mass in the left retroareolar region   is compatible with a site of biopsy-proven malignancy. It measures  approximately 2.4 x 2.1 x 2.2 centimeters (AP by TV by CC). There is  involvement of the nipple, with retraction. Susceptibility artifact from  the biopsy marker is noted in the center of the mass. *  In the 3:00 axis, middle third, there is an irregularly shaped  enhancing mass with surrounding non mass enhancement. This is compatible  with the additional site of biopsy-proven malignancy. Overall, the  abnormal enhancement measures 6.7 x 2.8 x 2.1 centimeters (AP x TV x CC).  The AP extent of the associated non mass enhancement measures  significantly larger than depicted on ultrasound, and extends towards the  site of malignancy in the retroareolar region.\par \par \par Family hx: maternal GM breast cancer (early 50's), maternal 1st cousin (late 50's), 1 maternal aunts ovarian, another maternal aunt w/ 2 unknown cancers. Father w/ prostate cancer. 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 No illicit drug use.\par \par PMH: Hashimoto's (53 y/o), was on thyroid medication in the past, no longer, HTN, arthritis, pre-diabetic, reflux (takes an occasional Zantac, and ventral hernia. Claustrophobia w/ MRI, situational anxiety/depression.\par \par Past sx hx: polyp removal (was told benign), cholecystomy, L knee sx. \par \par Health Care Providers:\par PCP: Dr. Rashard Posada ([place])\par \par Treatment summary:\par 9/28/18 C1 dose dense AC \par 10/12/18 C2 dose dense AC \par 10/26/18 C3 dose dense AC \par 11/9/18 C4 dose dense AC \par 11/23/18 week 1 Taxol [de-identified] : poorly differentiated IDC [de-identified] : Ms. Shook returns today for her follow-up.\par She is C11 of Taxol.\par She notes good appetite.\par She reports cold intolerance, severe fatigue, and worsening neuropathy she reports is usually in her thumbs and now in every finger.\par She denies fevers, bipedal swelling, fatigue,and constipation.\par She admits to mild intermittent nausea, bipedal edema, and shortness of breath.\par The remainder of her ROS is negative.

## 2019-02-01 NOTE — PHYSICAL EXAM
[Restricted in physically strenuous activity but ambulatory and able to carry out work of a light or sedentary nature] : Status 1- Restricted in physically strenuous activity but ambulatory and able to carry out work of a light or sedentary nature, e.g., light house work, office work [Obese] : obese [Normal] : affect appropriate [de-identified] : negative cervical, supra/infraclavicular adenopathy. [de-identified] : clear to auscultation and percussion. [de-identified] : S1/S2 sl tachy rate. [de-identified] : negative pedal edema or calve tenderness [de-identified] : BS+, soft, nontender on palpation. [de-identified] : negative axillary adenopathy. [de-identified] : without spinal or cva tenderness.

## 2019-02-01 NOTE — ADDENDUM
[FreeTextEntry1] : I, Harris Monsivais, acted solely as a scribe for Dr. Olimpia Narvaez on this date 2/1/19.

## 2019-02-05 ENCOUNTER — APPOINTMENT (OUTPATIENT)
Dept: PLASTIC SURGERY | Facility: CLINIC | Age: 59
End: 2019-02-05
Payer: COMMERCIAL

## 2019-02-05 VITALS
HEART RATE: 109 BPM | HEIGHT: 65 IN | BODY MASS INDEX: 39.65 KG/M2 | OXYGEN SATURATION: 93 % | DIASTOLIC BLOOD PRESSURE: 65 MMHG | WEIGHT: 238 LBS | SYSTOLIC BLOOD PRESSURE: 93 MMHG | TEMPERATURE: 97.8 F

## 2019-02-05 PROCEDURE — XXXXX: CPT

## 2019-02-05 NOTE — ADDENDUM
[FreeTextEntry1] : I, Marietta Wallis, acted solely as a scribe for Dr. Jaxon Hopson on this date 2/5/19.

## 2019-02-05 NOTE — HISTORY OF PRESENT ILLNESS
[FreeTextEntry1] : 59 y/o woman with left breast cancer presents to discuss breast reconstruction. \par She began chemotherapy in September 2018 and is scheduled to complete it this Friday (2/8/19).\par Her current surgical plan with Dr. Riggs is a bilateral mastectomy. \par She is currently a C/D cup, and would like to be a slightly smaller cup size than she is now (small C cup).\par \par Patient is not a current smoker but has a hx of smoking, quit 30 years ago.\par She does not have any children. \par No FMHx of bleeding disorders or early breast cancer.\par Allergic to Ancef- gets hives, itching, and Percocet, Vicodin, Codeine- gets violently ill, tachycardic. \par \par PMHx: irregular heartbeat. She has had an echocardiogram prior to starting chemotherapy\par She reports multiple benign left breast biopsies prior to current diagnosis\par Surgical hx: open cholecystectomy in 1988.\par No pulmonary problems or previous blood clots.

## 2019-02-05 NOTE — PHYSICAL EXAM
[NI] : Normal [Bra Size: _______] : Bra Size: [unfilled] [de-identified] : Please see breast examination sheet\par Grade II ptosis bilaterally.\par HB was present during exam.  [de-identified] : Kocher's incision scar\par Abdominal pannus

## 2019-02-08 ENCOUNTER — APPOINTMENT (OUTPATIENT)
Age: 59
End: 2019-02-08

## 2019-02-08 ENCOUNTER — RESULT REVIEW (OUTPATIENT)
Age: 59
End: 2019-02-08

## 2019-02-08 ENCOUNTER — LABORATORY RESULT (OUTPATIENT)
Age: 59
End: 2019-02-08

## 2019-02-08 LAB
ANISOCYTOSIS BLD QL: SLIGHT — SIGNIFICANT CHANGE UP
BASO STIPL BLD QL SMEAR: PRESENT — SIGNIFICANT CHANGE UP
BASOPHILS # BLD AUTO: 0.1 K/UL — SIGNIFICANT CHANGE UP (ref 0–0.2)
BASOPHILS NFR BLD AUTO: 3 % — HIGH (ref 0–2)
EOSINOPHIL # BLD AUTO: 0.1 K/UL — SIGNIFICANT CHANGE UP (ref 0–0.5)
HCT VFR BLD CALC: 38.1 % — SIGNIFICANT CHANGE UP (ref 34.5–45)
HGB BLD-MCNC: 12.6 G/DL — SIGNIFICANT CHANGE UP (ref 11.5–15.5)
HYPOSEGMENTATION: PRESENT — SIGNIFICANT CHANGE UP
LG PLATELETS BLD QL AUTO: SLIGHT — SIGNIFICANT CHANGE UP
LYMPHOCYTES # BLD AUTO: 0.9 K/UL — LOW (ref 1–3.3)
LYMPHOCYTES # BLD AUTO: 21 % — SIGNIFICANT CHANGE UP (ref 13–44)
MACROCYTES BLD QL: SLIGHT — SIGNIFICANT CHANGE UP
MCHC RBC-ENTMCNC: 33.2 G/DL — SIGNIFICANT CHANGE UP (ref 32–36)
MCHC RBC-ENTMCNC: 33.7 PG — SIGNIFICANT CHANGE UP (ref 27–34)
MCV RBC AUTO: 101.7 FL — HIGH (ref 80–100)
METAMYELOCYTES # FLD: 4 % — HIGH (ref 0–0)
MONOCYTES # BLD AUTO: 0.3 K/UL — SIGNIFICANT CHANGE UP (ref 0–0.9)
MONOCYTES NFR BLD AUTO: 4 % — SIGNIFICANT CHANGE UP (ref 2–14)
MYELOCYTES NFR BLD: 3 % — HIGH (ref 0–0)
NEUTROPHILS # BLD AUTO: 2.9 K/UL — SIGNIFICANT CHANGE UP (ref 1.8–7.4)
NEUTROPHILS NFR BLD AUTO: 62 % — SIGNIFICANT CHANGE UP (ref 43–77)
NEUTS BAND # BLD: 1 % — SIGNIFICANT CHANGE UP (ref 0–8)
NEUTS HYPERSEG # BLD: PRESENT — SIGNIFICANT CHANGE UP
NRBC # BLD: 2 /100 — HIGH (ref 0–0)
PLAT MORPH BLD: NORMAL — SIGNIFICANT CHANGE UP
PLATELET # BLD AUTO: 312 K/UL — SIGNIFICANT CHANGE UP (ref 150–400)
POIKILOCYTOSIS BLD QL AUTO: SLIGHT — SIGNIFICANT CHANGE UP
POLYCHROMASIA BLD QL SMEAR: SLIGHT — SIGNIFICANT CHANGE UP
PROMYELOCYTES # FLD: 2 % — HIGH (ref 0–0)
RBC # BLD: 3.74 M/UL — LOW (ref 3.8–5.2)
RBC # FLD: 15.3 % — HIGH (ref 10.3–14.5)
RBC BLD AUTO: SIGNIFICANT CHANGE UP
TOXIC GRANULES BLD QL SMEAR: PRESENT — SIGNIFICANT CHANGE UP
WBC # BLD: 4.3 K/UL — SIGNIFICANT CHANGE UP (ref 3.8–10.5)
WBC # FLD AUTO: 4.3 K/UL — SIGNIFICANT CHANGE UP (ref 3.8–10.5)

## 2019-02-11 ENCOUNTER — APPOINTMENT (OUTPATIENT)
Dept: SURGERY | Facility: CLINIC | Age: 59
End: 2019-02-11
Payer: COMMERCIAL

## 2019-02-11 VITALS
HEART RATE: 106 BPM | TEMPERATURE: 98.3 F | BODY MASS INDEX: 39.36 KG/M2 | DIASTOLIC BLOOD PRESSURE: 80 MMHG | SYSTOLIC BLOOD PRESSURE: 119 MMHG | WEIGHT: 236.5 LBS

## 2019-02-11 DIAGNOSIS — M89.9 DISORDER OF BONE, UNSPECIFIED: ICD-10-CM

## 2019-02-11 PROCEDURE — 99214 OFFICE O/P EST MOD 30 MIN: CPT

## 2019-02-11 NOTE — PHYSICAL EXAM
[Normocephalic] : normocephalic [Atraumatic] : atraumatic [EOMI] : extra ocular movement intact [PERRL] : pupils equal, round and reactive to light [Sclera nonicteric] : sclera nonicteric [Supple] : supple [No Supraclavicular Adenopathy] : no supraclavicular adenopathy [Examined in the supine and seated position] : examined in the supine and seated position [No dominant masses] : no dominant masses in right breast  [No dominant masses] : no dominant masses left breast [No Nipple Retraction] : no left nipple retraction [No Nipple Discharge] : no left nipple discharge [No Axillary Lymphadenopathy] : no left axillary lymphadenopathy [No Edema] : no edema [No Rashes] : no rashes [No Ulceration] : no ulceration [de-identified] : No supraclavicular or axillary adenopathy. 3cm mass behind left nipple. Everted nipple without discharge. No skin changes.\par  [de-identified] : No supraclavicular or axillary adenopathy. No dominant masses, normal to palpation. Everted nipple without discharge. No skin changes.\par

## 2019-02-11 NOTE — HISTORY OF PRESENT ILLNESS
[FreeTextEntry1] : I had the pleasure of seeing KRISTIE GARZON  in the office today to discuss final surgical decision.\par \par She states she felt this left breast mass 8 months ago and does not undergo annual screening mammogram.  She stated in the last year she has lost both her parents and her  is an alcoholic and neglected the left breast mass.  She also stated she left breast nipple discharge 2 years ago and it was clear.  She recently saw her PCP who sent her for imaging demonstrated a 3.5 cm spiculated mass recommending biopsy and is highly suspicious for malignancy.  Biopsy results demonstrated poorly differentiated invasive ductal carcinoma of left breast ER >90% NV >90% Her2 negative.  She underwent neoadjuvant chemotherapy and completed chemotherapy on 2/5/2019.\par \par G0.  Menses began at age 11.\par She denies personal history of malignancy.\par Family history is significant for maternal grandmother diagnosed with breast cancer at age 55, 2 maternal aunts diagnosed with breast cancer and maternal cousin diagnosed with breast cancer and father diagnosed with prostate cancer at age 74.\par \par Imaging:  [facility] \par 8/27/2018  MAMMO DIAG  KADEN BILAT \par Impression:  New 3 cm irregular spiculated mass in the left retroareolar region with overlying skin retraction and nipple inversion with corresponding solid shadowing mass on sonography with morphologic features highly suspicious for malignancy.  BIRADS 5 highly suggestive for malignancy.  Recommendation Biopsy.  \par 8/27/2018  Breast ultrasound left complete\par Impression:  3.5 cm irregular hypoechoic solid shadowing mass corresponding with the spiculated mass demonstrated mammographically in the left retroareolar region in the area of palpable concern with morphologic features highly suspicious for malignancy.  BIRADS 5 highly suggestive malignancy\par \par 9/14/2018  MR breast wawic bi w cad \par Impression:  Multicentric biopsy proven malignancy in the left breast.  The abnormal non mass enhancement associated with the biopsy proven malignancy in the left 3:00 axis measures significantly larger in AP dimension than depicted on the ultrasound, and extends towards the site of malignancy in the retroareolar region.  No MRI evidence of malignancy in the right breast.  No suspicious lymphadenopathy.  BIRADS 6 Known biopsy proven malignancy.\par \par Pathology:  \par 8/30/2018  Final Diagnosis:\par 1.  Left breast, retroareolar, ultrasound guided core biopsy:  Invasive poorly differentiated ductal carcinoma with micropapillary features.  [place] score 8/9 in this limited material.  Invasive tumor measures at least 1.3 cm.  ductal carcinoma in situ is not identified.  Microcalcifications are not identified.  Lymphovascular permeation by tumor is not a seen.  ER 90% Pr 95% Her2 negative\par \par 2.  Left breast, 3:00, 6 cm from nipple, ultrasound guided core biopsy:  Invasive poorly differentiated ductal carcinoma with micropapillary features.  Steven score 8/9 in this limited material.  Invasive tumor measures at least 1.0 cm.  Ductal carcinoma in situ, cribriform patten with intermediate grade nuclear atypia and central necrosis.  Microcalcifications are not identified.  Lymphovascular permeation by tumor is not seen.  \par \par We reviewed clinica breast exam and restaging process prior to surgery.  She has met with Dr. Hopson and wants to proceed with SUNNI procedure.  Her MRI has been arranged on 2/19/2019 and will call her with the results via mobile.  Her surgery is to be scheduled 3/18/2019.  Plan for bilateral SHAUNA  breast mastectomy with left SLNB and SUNNI.    \par \par She understands the plan and all questions were answered.

## 2019-02-11 NOTE — PAST MEDICAL HISTORY
[Postmenopausal] : The patient is postmenopausal [Menarche Age ____] : age at menarche was [unfilled] [Menopause Age____] : age at menopause was [unfilled] [Total Preg ___] : G[unfilled]

## 2019-02-11 NOTE — REVIEW OF SYSTEMS
[As Noted in HPI] : as noted in HPI [Breast Pain] : breast pain [Breast Lump] : breast lump [Negative] : Heme/Lymph

## 2019-02-11 NOTE — ASSESSMENT
[FreeTextEntry1] : 56 yo postmenopausal female presents with Left retroareolar IDC grade 3 ER 90% NE 95% HER2 negative and left breast 3 o'clock biopsy demonstrated IDC grade 3 plus DCIS ER >90% NE >90% her2 negative. She has completed neoadjuvant chemotherapy on 2/5/2019 and will undergo restaging MRI on 2/19/2019.  Will discuss MRI results vis mobile.  She with Dr. Hopson and wants to proceed with Bilateral mastectomy with left SLNB and SUNNI.  \par 1.  Plan for bilateral mastectomy with left SLNB and SUNNI\par 2.  Restaging MRI 2/19/2019\par 3.  Internal review\par 4.  Medical clearance

## 2019-02-19 ENCOUNTER — OUTPATIENT (OUTPATIENT)
Dept: OUTPATIENT SERVICES | Facility: HOSPITAL | Age: 59
LOS: 1 days | End: 2019-02-19
Payer: COMMERCIAL

## 2019-02-19 ENCOUNTER — APPOINTMENT (OUTPATIENT)
Dept: MRI IMAGING | Facility: CLINIC | Age: 59
End: 2019-02-19
Payer: COMMERCIAL

## 2019-02-19 DIAGNOSIS — C50.912 MALIGNANT NEOPLASM OF UNSPECIFIED SITE OF LEFT FEMALE BREAST: ICD-10-CM

## 2019-02-19 PROCEDURE — A9585: CPT

## 2019-02-19 PROCEDURE — C8937: CPT

## 2019-02-19 PROCEDURE — 77049 MRI BREAST C-+ W/CAD BI: CPT | Mod: 26

## 2019-02-19 PROCEDURE — C8908: CPT

## 2019-02-26 ENCOUNTER — OUTPATIENT (OUTPATIENT)
Dept: OUTPATIENT SERVICES | Facility: HOSPITAL | Age: 59
LOS: 1 days | Discharge: HOME | End: 2019-02-26

## 2019-02-26 DIAGNOSIS — C50.919 MALIGNANT NEOPLASM OF UNSPECIFIED SITE OF UNSPECIFIED FEMALE BREAST: ICD-10-CM

## 2019-02-27 ENCOUNTER — TRANSCRIPTION ENCOUNTER (OUTPATIENT)
Age: 59
End: 2019-02-27

## 2019-03-01 PROBLEM — M89.9 BONE LESION: Status: ACTIVE | Noted: 2019-03-01

## 2019-03-05 ENCOUNTER — TRANSCRIPTION ENCOUNTER (OUTPATIENT)
Age: 59
End: 2019-03-05

## 2019-03-07 ENCOUNTER — OUTPATIENT (OUTPATIENT)
Dept: OUTPATIENT SERVICES | Facility: HOSPITAL | Age: 59
LOS: 1 days | End: 2019-03-07

## 2019-03-07 ENCOUNTER — APPOINTMENT (OUTPATIENT)
Dept: NUCLEAR MEDICINE | Facility: CLINIC | Age: 59
End: 2019-03-07
Payer: COMMERCIAL

## 2019-03-07 ENCOUNTER — RESULT REVIEW (OUTPATIENT)
Age: 59
End: 2019-03-07

## 2019-03-07 ENCOUNTER — APPOINTMENT (OUTPATIENT)
Dept: HEMATOLOGY ONCOLOGY | Facility: CLINIC | Age: 59
End: 2019-03-07
Payer: COMMERCIAL

## 2019-03-07 ENCOUNTER — APPOINTMENT (OUTPATIENT)
Dept: CT IMAGING | Facility: CLINIC | Age: 59
End: 2019-03-07
Payer: COMMERCIAL

## 2019-03-07 VITALS
BODY MASS INDEX: 22.66 KG/M2 | WEIGHT: 136 LBS | DIASTOLIC BLOOD PRESSURE: 94 MMHG | HEART RATE: 92 BPM | OXYGEN SATURATION: 99 % | SYSTOLIC BLOOD PRESSURE: 142 MMHG | HEIGHT: 65 IN | TEMPERATURE: 98.2 F

## 2019-03-07 DIAGNOSIS — M89.9 DISORDER OF BONE, UNSPECIFIED: ICD-10-CM

## 2019-03-07 LAB
BASOPHILS # BLD AUTO: 0.1 K/UL — SIGNIFICANT CHANGE UP (ref 0–0.2)
BASOPHILS NFR BLD AUTO: 1 % — SIGNIFICANT CHANGE UP (ref 0–2)
EOSINOPHIL # BLD AUTO: 0.2 K/UL — SIGNIFICANT CHANGE UP (ref 0–0.5)
EOSINOPHIL NFR BLD AUTO: 2.7 % — SIGNIFICANT CHANGE UP (ref 0–6)
HCT VFR BLD CALC: 42.4 % — SIGNIFICANT CHANGE UP (ref 34.5–45)
HGB BLD-MCNC: 13.7 G/DL — SIGNIFICANT CHANGE UP (ref 11.5–15.5)
LYMPHOCYTES # BLD AUTO: 1.2 K/UL — SIGNIFICANT CHANGE UP (ref 1–3.3)
LYMPHOCYTES # BLD AUTO: 15.5 % — SIGNIFICANT CHANGE UP (ref 13–44)
MCHC RBC-ENTMCNC: 32.4 G/DL — SIGNIFICANT CHANGE UP (ref 32–36)
MCHC RBC-ENTMCNC: 32.4 PG — SIGNIFICANT CHANGE UP (ref 27–34)
MCV RBC AUTO: 100.3 FL — HIGH (ref 80–100)
MONOCYTES # BLD AUTO: 0.6 K/UL — SIGNIFICANT CHANGE UP (ref 0–0.9)
MONOCYTES NFR BLD AUTO: 7.3 % — SIGNIFICANT CHANGE UP (ref 2–14)
NEUTROPHILS # BLD AUTO: 5.6 K/UL — SIGNIFICANT CHANGE UP (ref 1.8–7.4)
NEUTROPHILS NFR BLD AUTO: 73.4 % — SIGNIFICANT CHANGE UP (ref 43–77)
PLATELET # BLD AUTO: 214 K/UL — SIGNIFICANT CHANGE UP (ref 150–400)
RBC # BLD: 4.23 M/UL — SIGNIFICANT CHANGE UP (ref 3.8–5.2)
RBC # FLD: 13.3 % — SIGNIFICANT CHANGE UP (ref 10.3–14.5)
WBC # BLD: 7.6 K/UL — SIGNIFICANT CHANGE UP (ref 3.8–10.5)
WBC # FLD AUTO: 7.6 K/UL — SIGNIFICANT CHANGE UP (ref 3.8–10.5)

## 2019-03-07 PROCEDURE — 71260 CT THORAX DX C+: CPT | Mod: 26

## 2019-03-07 PROCEDURE — 78306 BONE IMAGING WHOLE BODY: CPT | Mod: 26

## 2019-03-07 PROCEDURE — 78999 UNLISTED MISC PX DX NUC MED: CPT | Mod: 26

## 2019-03-07 PROCEDURE — 99214 OFFICE O/P EST MOD 30 MIN: CPT

## 2019-03-07 NOTE — PHYSICAL EXAM
[Restricted in physically strenuous activity but ambulatory and able to carry out work of a light or sedentary nature] : Status 1- Restricted in physically strenuous activity but ambulatory and able to carry out work of a light or sedentary nature, e.g., light house work, office work [Obese] : obese [Normal] : affect appropriate [de-identified] : negative cervical, supra/infraclavicular adenopathy. [de-identified] : clear to auscultation and percussion. [de-identified] : S1/S2 [de-identified] : no edema [de-identified] : BS+, soft, nontender on palpation. [de-identified] : without spinal or cva tenderness.

## 2019-03-07 NOTE — HISTORY OF PRESENT ILLNESS
[Disease: _____________________] : Disease: [unfilled] [T: ___] : T[unfilled] [de-identified] : Ms. KRISTIE GARZON, is a postmenopausal female who was diagnosed with poorly differentiated invasive ductal carcinoma of left breast cancer, ER >90% WA>90%, HER2 negative.   \par \par At initial presentation, she noted L nipple inversion early August 2018 and was sent for a diagnostic mammo and sonogram by her PCP. In addition, she also had noted a left breast mass ~ 8 months ago, and began to notice more pain at the site. She has not gone for screening mammo in a few years. In the last year, she has lost both her parents, and w/ other family issues, neglected the left breast mass. \par \par Mammogram on 8/27/18 demonstrated a 3.0 cm spiculated mass in the left breast retroareolar region overlying skin retraction and nipple inversion, highly suspicious for malignancy.  US breast showed left breast retroareolar 2.5 x 2 x 3.5 cm irregular hypoechoic solid shadowing mass with ill defined borders corresponding with spiculated mass demonstrated mammographically with morphologic features highly suspicious for malignancy as well as 1.3 x 0.5 x 1.0 cm cystic cluster at 3:00 7cmfn. \par \par She saw Dr. Riggs on 8/28/18 and US guided core biopsy was performed on 8/30/18.  \par \par 8/30/18 Pathology\par 1. Left breast, retroareolar, ultrasound guided core biopsy - poorly differentiated IDC with micropapillary features. ER 90%, WA 95%, HER2 negative.\par - Steven score 8/9 (3 + 3 + 2) in this limited material.\par  - Invasive tumor measures at least 1.3 cm\par   2. Left breast, 3:00, 6 cm fn, - poorly differentiated IDC  with  micropapillary features. ER>90% WA>90%, HER2 negative\par - [place] score 8/9 \par \par 9/14/18 MRI breast - LEFT BREAST: *  An irregularly shaped enhancing mass in the left retroareolar region   is compatible with a site of biopsy-proven malignancy. It measures  approximately 2.4 x 2.1 x 2.2 centimeters (AP by TV by CC). There is  involvement of the nipple, with retraction. Susceptibility artifact from  the biopsy marker is noted in the center of the mass. *  In the 3:00 axis, middle third, there is an irregularly shaped  enhancing mass with surrounding non mass enhancement. This is compatible  with the additional site of biopsy-proven malignancy. Overall, the  abnormal enhancement measures 6.7 x 2.8 x 2.1 centimeters (AP x TV x CC).  The AP extent of the associated non mass enhancement measures  significantly larger than depicted on ultrasound, and extends towards the  site of malignancy in the retroareolar region.\par \par \par Family hx: maternal GM breast cancer (early 50's), maternal 1st cousin (late 50's), 1 maternal aunts ovarian, another maternal aunt w/ 2 unknown cancers. Father w/ prostate cancer. 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 No illicit drug use.\par \par PMH: Hashimoto's (55 y/o), was on thyroid medication in the past, no longer, HTN, arthritis, pre-diabetic, reflux (takes an occasional Zantac, and ventral hernia. Claustrophobia w/ MRI, situational anxiety/depression.\par \par Past sx hx: polyp removal (was told benign), cholecystomy, L knee sx. \par \par Health Care Providers:\par PCP: Dr. Rashard Posada ([place])\par \par Treatment summary:\par 9/28/18 C1 dose dense AC \par 10/12/18 C2 dose dense AC \par 10/26/18 C3 dose dense AC \par 11/9/18 C4 dose dense AC \par 11/23/18 week 1 Taxol [de-identified] : poorly differentiated IDC [de-identified] : Ms. Shook returns today for her follow-up.\par Completed weekly Taxol on 2/8/19. \par Feels well, has some fatigue.\par Notes that her legs feel weak, especially when walking up the stairs.  \par \par Surgery date is 3/27.\par CT chest and bone scan done today. \par \par 2/19/18 breast MRI\par RIGHT BREAST:\par A chemotherapy infusion catheter is visualized within the upper inner right breast. There are scattered enhancing nonspecific foci.  There is no suspicious enhancement in the right breast.  \par \par LEFT BREAST:\par In the left breast 3:00 axis middle depth there is nonmass enhancement measuring approximately 4.8 x 1.8 cm (series 6, image 555), previously measuring 6.7 x 2.8 cm. Biopsy clip susceptibility artifact is seen at the anterior aspect of this enhancement which demonstrates predominantly progressive enhancement and corresponds to a site of biopsy proven malignancy.\par \par In the 3:00 left retroareolar location biopsy clip is again visualized in association with a 2.4 x 1.3 cm irregular enhancing mass (series 6, image \par 561), corresponding to the site of biopsy-proven malignancy. This mass is inseparable from the nipple with apparent left nipple retraction. This mass demonstrates predominantly progressive enhancement kinetics and previously measured 2.4 x 2.1 cm.\par \par In the lower central/slightly inner left breast there is an irregular enhancing mass measuring 1.0 x 0.8 x 0.8 cm (series 6, image 228). This demonstrates progressive enhancement kinetics.\par \par There are scattered enhancing nonspecific foci. \par \par AXILLA/OTHER:There is no significant axillary or internal mammary lymphadenopathy.  There is an unchanged nonspecific 4 mm T2 bright sternal lesion (series 3, image 26).\par \par IMPRESSION:\par 1.) There has been some interval decrease in size of biopsy-proven masses in the left breast 3:00 axis middle depth and the left 3:00 retroareolar location as described above, consistent with partial response to treatment. The left retroareolar mass is inseparable from the left nipple which is retracted.\par 2.) Additional irregular enhancing mass measuring 1.0 cm in the left lower central/slightly inner breast. If this will affect patient management, MRI guided biopsy is recommended. \par 3.) No suspicious enhancing findings in the right breast and no significant internal mammary or axillary lymphadenopathy.\par 4.) Unchanged nonspecific 4 mm T2 bright sternal lesion. This can be further evaluated with bone scan and sternal CT scan.

## 2019-03-07 NOTE — ASSESSMENT
[FreeTextEntry1] : 57 y/o postmenopausal female with locally advanced poorly differentiated left breast IDC,  ER>90%, GA>90%, HER2 negative.  Breast MRI with multicentric disease in left breast, largest abnormal enhancement measures 6.7 cm and extends towards 2.4 cm retroareolar mass.  Completed NACT with DD AC followed by T on 2/8/19.\par \par Reviewed post treatment MRI which shows partial response in left breast and nonspecific 4 mm sternal focus.\par Bone scan today did now show any suspicious lesions.\par She has fatigue which is expected to improve with time. \par \par \par Plan:\par -schedule for b/l mastectomy on 3/27/19\par -CT sternum is pending\par -follow up 3 weeks post op\par

## 2019-03-20 ENCOUNTER — OUTPATIENT (OUTPATIENT)
Dept: OUTPATIENT SERVICES | Facility: HOSPITAL | Age: 59
LOS: 1 days | Discharge: HOME | End: 2019-03-20
Payer: COMMERCIAL

## 2019-03-20 ENCOUNTER — RESULT REVIEW (OUTPATIENT)
Age: 59
End: 2019-03-20

## 2019-03-20 VITALS
SYSTOLIC BLOOD PRESSURE: 135 MMHG | TEMPERATURE: 98 F | DIASTOLIC BLOOD PRESSURE: 77 MMHG | OXYGEN SATURATION: 94 % | RESPIRATION RATE: 18 BRPM | HEART RATE: 87 BPM | WEIGHT: 231.93 LBS | HEIGHT: 65 IN

## 2019-03-20 DIAGNOSIS — C50.912 MALIGNANT NEOPLASM OF UNSPECIFIED SITE OF LEFT FEMALE BREAST: ICD-10-CM

## 2019-03-20 DIAGNOSIS — Z98.890 OTHER SPECIFIED POSTPROCEDURAL STATES: Chronic | ICD-10-CM

## 2019-03-20 DIAGNOSIS — K82.9 DISEASE OF GALLBLADDER, UNSPECIFIED: Chronic | ICD-10-CM

## 2019-03-20 DIAGNOSIS — Z29.9 ENCOUNTER FOR PROPHYLACTIC MEASURES, UNSPECIFIED: ICD-10-CM

## 2019-03-20 LAB
ABO RH CONFIRMATION: SIGNIFICANT CHANGE UP
ANION GAP SERPL CALC-SCNC: 9 MMOL/L — SIGNIFICANT CHANGE UP (ref 5–17)
APPEARANCE UR: CLEAR — SIGNIFICANT CHANGE UP
APTT BLD: 28.7 SEC — SIGNIFICANT CHANGE UP (ref 27.5–36.3)
BACTERIA # UR AUTO: ABNORMAL
BASOPHILS # BLD AUTO: 0.04 K/UL — SIGNIFICANT CHANGE UP (ref 0–0.2)
BASOPHILS NFR BLD AUTO: 0.7 % — SIGNIFICANT CHANGE UP (ref 0–2)
BILIRUB UR-MCNC: NEGATIVE — SIGNIFICANT CHANGE UP
BLD GP AB SCN SERPL QL: SIGNIFICANT CHANGE UP
BUN SERPL-MCNC: 10 MG/DL — SIGNIFICANT CHANGE UP (ref 7–23)
CALCIUM SERPL-MCNC: 9 MG/DL — SIGNIFICANT CHANGE UP (ref 8.5–10.1)
CHLORIDE SERPL-SCNC: 108 MMOL/L — SIGNIFICANT CHANGE UP (ref 96–108)
CO2 SERPL-SCNC: 23 MMOL/L — SIGNIFICANT CHANGE UP (ref 22–31)
COLOR SPEC: YELLOW — SIGNIFICANT CHANGE UP
CREAT SERPL-MCNC: 0.64 MG/DL — SIGNIFICANT CHANGE UP (ref 0.5–1.3)
DIFF PNL FLD: ABNORMAL
EOSINOPHIL # BLD AUTO: 0.19 K/UL — SIGNIFICANT CHANGE UP (ref 0–0.5)
EOSINOPHIL NFR BLD AUTO: 3.3 % — SIGNIFICANT CHANGE UP (ref 0–6)
EPI CELLS # UR: SIGNIFICANT CHANGE UP
GLUCOSE SERPL-MCNC: 109 MG/DL — HIGH (ref 70–99)
GLUCOSE UR QL: NEGATIVE MG/DL — SIGNIFICANT CHANGE UP
HCT VFR BLD CALC: 41.9 % — SIGNIFICANT CHANGE UP (ref 34.5–45)
HGB BLD-MCNC: 14.1 G/DL — SIGNIFICANT CHANGE UP (ref 11.5–15.5)
HYALINE CASTS # UR AUTO: ABNORMAL /LPF
IMM GRANULOCYTES NFR BLD AUTO: 0.3 % — SIGNIFICANT CHANGE UP (ref 0–1.5)
INR BLD: 1.09 RATIO — SIGNIFICANT CHANGE UP (ref 0.88–1.16)
KETONES UR-MCNC: ABNORMAL
LEUKOCYTE ESTERASE UR-ACNC: ABNORMAL
LYMPHOCYTES # BLD AUTO: 1.05 K/UL — SIGNIFICANT CHANGE UP (ref 1–3.3)
LYMPHOCYTES # BLD AUTO: 18 % — SIGNIFICANT CHANGE UP (ref 13–44)
MCHC RBC-ENTMCNC: 33.1 PG — SIGNIFICANT CHANGE UP (ref 27–34)
MCHC RBC-ENTMCNC: 33.7 GM/DL — SIGNIFICANT CHANGE UP (ref 32–36)
MCV RBC AUTO: 98.4 FL — SIGNIFICANT CHANGE UP (ref 80–100)
MONOCYTES # BLD AUTO: 0.51 K/UL — SIGNIFICANT CHANGE UP (ref 0–0.9)
MONOCYTES NFR BLD AUTO: 8.8 % — SIGNIFICANT CHANGE UP (ref 2–14)
NEUTROPHILS # BLD AUTO: 4.01 K/UL — SIGNIFICANT CHANGE UP (ref 1.8–7.4)
NEUTROPHILS NFR BLD AUTO: 68.9 % — SIGNIFICANT CHANGE UP (ref 43–77)
NITRITE UR-MCNC: NEGATIVE — SIGNIFICANT CHANGE UP
NRBC # BLD: 0 /100 WBCS — SIGNIFICANT CHANGE UP (ref 0–0)
PH UR: 5 — SIGNIFICANT CHANGE UP (ref 5–8)
PLATELET # BLD AUTO: 252 K/UL — SIGNIFICANT CHANGE UP (ref 150–400)
POTASSIUM SERPL-MCNC: 4.3 MMOL/L — SIGNIFICANT CHANGE UP (ref 3.5–5.3)
POTASSIUM SERPL-SCNC: 4.3 MMOL/L — SIGNIFICANT CHANGE UP (ref 3.5–5.3)
PROT UR-MCNC: 15 MG/DL
PROTHROM AB SERPL-ACNC: 12.1 SEC — SIGNIFICANT CHANGE UP (ref 10–12.9)
RBC # BLD: 4.26 M/UL — SIGNIFICANT CHANGE UP (ref 3.8–5.2)
RBC # FLD: 13.3 % — SIGNIFICANT CHANGE UP (ref 10.3–14.5)
RBC CASTS # UR COMP ASSIST: SIGNIFICANT CHANGE UP /HPF (ref 0–4)
SODIUM SERPL-SCNC: 140 MMOL/L — SIGNIFICANT CHANGE UP (ref 135–145)
SP GR SPEC: 1.02 — SIGNIFICANT CHANGE UP (ref 1.01–1.02)
TYPE + AB SCN PNL BLD: SIGNIFICANT CHANGE UP
UROBILINOGEN FLD QL: NEGATIVE MG/DL — SIGNIFICANT CHANGE UP
WBC # BLD: 5.82 K/UL — SIGNIFICANT CHANGE UP (ref 3.8–10.5)
WBC # FLD AUTO: 5.82 K/UL — SIGNIFICANT CHANGE UP (ref 3.8–10.5)
WBC UR QL: SIGNIFICANT CHANGE UP

## 2019-03-20 PROCEDURE — 93010 ELECTROCARDIOGRAM REPORT: CPT

## 2019-03-20 PROCEDURE — 88321 CONSLTJ&REPRT SLD PREP ELSWR: CPT

## 2019-03-20 NOTE — H&P PST ADULT - NSICDXPASTMEDICALHX_GEN_ALL_CORE_FT
PAST MEDICAL HISTORY:  Anxiety     Arthritis     Breast cancer, left     H/O: HTN (hypertension)     Hashimoto's disease     Sleep apnea unable to tolerate CPAP

## 2019-03-20 NOTE — H&P PST ADULT - CARDIOVASCULAR COMMENTS
HTN stable on medication- has had cardio evaluation Dr Sanchez about 3 yrs ago, dyspnea on exertion while doing chemo has improved since completed

## 2019-03-20 NOTE — H&P PST ADULT - ASSESSMENT
58 y.o  Left skin sparing mastectomy with surgical lymph node biopsy possible axillary dissection and right risk reduction mastectomy, bilateral breast reconstruction with deep inferior epigastric artery  flap 58 y.o  Left skin sparing mastectomy with surgical lymph node biopsy possible axillary dissection and right risk reduction mastectomy, bilateral breast reconstruction with deep inferior epigastric artery  flap   Plan  1. Stop all NSAIDS, herbal supplements and vitamins for 7 days.  2. NPO at midnight.  3. Take the following medications Metoprolol, Telmisartan  with small sips of water on the morning of your procedure/surgery.  4. Use EZ sponges as directed  5. Labs, EKG as per surgeon  6. PMD Dr Posada clearance for optimization prior to surgery as per surgeon    SIMÓNI SCORE    AGE RELATED RISK FACTORS                                                       MOBILITY RELATED FACTORS  x[ ] Age 41-60 years                                            (1 Point)                  [ ] Bed rest                                                        (1 Point)  [ ] Age: 61-74 years                                           (2 Points)                [ ] Plaster cast                                                   (2 Points)  [ ] Age= 75 years                                              (3 Points)                 [ ] Bed bound for more than 72 hours                   (2 Points)    DISEASE RELATED RISK FACTORS                                               GENDER SPECIFIC FACTORS  [ ] Edema in the lower extremities                       (1 Point)                  [ ] Pregnancy                                                     (1 Point)  [ ] Varicose veins                                               (1 Point)                  [ ] Post-partum < 6 weeks                                   (1 Point)             [x ] BMI > 25 Kg/m2                                            (1 Point)                  [ ] Hormonal therapy  or oral contraception            (1 Point)                 [ ] Sepsis (in the previous month)                        (1 Point)                  [ ] History of pregnancy complications  [ ] Pneumonia or serious lung disease                                               [ ] Unexplained or recurrent                       (1 Point)           (in the previous month)                               (1 Point)  [ ] Abnormal pulmonary function test                     (1 Point)                 SURGERY RELATED RISK FACTORS  [ ] Acute myocardial infarction                              (1 Point)                 [ ]  Section                                            (1 Point)  [ ] Congestive heart failure (in the previous month)  (1 Point)                 [ ] Minor surgery                                                 (1 Point)   [ ] Inflammatory bowel disease                             (1 Point)                 [ ] Arthroscopic surgery                                        (2 Points)  [ ] Central venous access                                    (2 Points)                [x ] General surgery lasting more than 45 minutes   (2 Points)       [ ] Stroke (in the previous month)                          (5 Points)               [ ] Elective arthroplasty                                        (5 Points)                                                                                                                                               HEMATOLOGY RELATED FACTORS                                                 TRAUMA RELATED RISK FACTORS  [ ] Prior episodes of VTE                                     (3 Points)                 [ ] Fracture of the hip, pelvis, or leg                       (5 Points)  [ ] Positive family history for VTE                         (3 Points)                 [ ] Acute spinal cord injury (in the previous month)  (5 Points)  [ ] Prothrombin 65286 A                                      (3 Points)                 [ ] Paralysis  (less than 1 month)                          (5 Points)  [ ] Factor V Leiden                                             (3 Points)                 [ ] Multiple Trauma within 1 month                         (5 Points)  [ ] Lupus anticoagulants                                     (3 Points)                                                           [ ] Anticardiolipin antibodies                                (3 Points)                                                       [ ] High homocysteine in the blood                      (3 Points)                                             [ ] Other congenital or acquired thrombophilia       (3 Points)                                                [ ] Heparin induced thrombocytopenia                  (3 Points)                                          Total Score [      4    ]

## 2019-03-20 NOTE — H&P PST ADULT - NSICDXFAMILYHX_GEN_ALL_CORE_FT
FAMILY HISTORY:  Family history of diabetes mellitus (DM), Mother-   FH: Alzheimers disease, Father-   FH: HTN (hypertension), Mother & Father  FH: prostate cancer, Father -   FH: stroke, Mother-

## 2019-03-20 NOTE — H&P PST ADULT - ENDOCRINE COMMENTS
Hashimotos- stopped medication about 1yr ago due to side affects followed with endocrinology initially but no longer does

## 2019-03-20 NOTE — H&P PST ADULT - HISTORY OF PRESENT ILLNESS
58 y.o female presents for PST with hx of left breast nipple inversion, mentioned to PCP during physical exam in 8/2018, sent for mammogram and further diagnostics, +left breast cancer, required chemo and now scheduled for Left skin sparing mastectomy with surgical lymph node biopsy possible axillary dissection and right risk reduction mastectomy, bilateral breast reconstruction with deep inferior epigastric artery  flap 58 y.o female presents for PST with hx of left breast nipple inversion for about 8 months,  mentioned to PCP during physical exam in 8/2018, sent for mammogram and further diagnostics, +left breast cancer, required chemo 16 rounds  and now scheduled for Left skin sparing mastectomy with surgical lymph node biopsy possible axillary dissection and right risk reduction mastectomy, bilateral breast reconstruction with deep inferior epigastric artery  flap

## 2019-03-20 NOTE — H&P PST ADULT - RS GEN PE MLT RESP DETAILS PC
no rales/no wheezes/no rhonchi/respirations non-labored/clear to auscultation bilaterally/breath sounds equal

## 2019-03-20 NOTE — H&P PST ADULT - NSICDXPASTSURGICALHX_GEN_ALL_CORE_FT
PAST SURGICAL HISTORY:  Gallbladder disease hx open cholecystectomy 1980's    H/O arthroscopy of knee Left knee- 1980's

## 2019-03-20 NOTE — H&P PST ADULT - TEACHING/LEARNING LEARNING PREFERENCES
video/written material/pictorial/skill demonstration/audio/individual instruction/group instruction/verbal instruction/computer/internet

## 2019-03-20 NOTE — H&P PST ADULT - ENMT
Patient returned phone call and was notified the message below.  Patient verbalized understanding.  Appointment with Dr. Colby Noe was confirmed with patient.   details… detailed exam pharynx

## 2019-03-20 NOTE — H&P PST ADULT - SURGICAL SITE INCISION
Spoke with Julianne from [facility], she states Dr. Papo Monzon would have an opening on 12/27/2017 at 11:30AM or Dr. WILNER Loving would have an opening on 12/20/2017 at the [facility] Office building at 2PM.   Attempted to reach patient. Left above information and [facility] phone number [phone] on patient's voicemail.     no

## 2019-03-20 NOTE — H&P PST ADULT - NSICDXPROBLEM_GEN_ALL_CORE_FT
PROBLEM DIAGNOSES  Problem: Need for prophylactic measure  Assessment and Plan:   The Caprini score indicates this patient is at risk for a VTE event (score 3-5).  Most surgical patients in this group would benefit from pharmacologic prophylaxis.  The surgical team will determine the balance between VTE risk and bleeding risk

## 2019-03-20 NOTE — H&P PST ADULT - ATTENDING COMMENTS
Patient understands technical aspects of the procedure including risks v benefits and alternative to this particular surgery.  All questions have been answered. 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

## 2019-03-21 ENCOUNTER — OUTPATIENT (OUTPATIENT)
Dept: OUTPATIENT SERVICES | Facility: HOSPITAL | Age: 59
LOS: 1 days | End: 2019-03-21
Payer: COMMERCIAL

## 2019-03-21 ENCOUNTER — APPOINTMENT (OUTPATIENT)
Dept: MRI IMAGING | Facility: CLINIC | Age: 59
End: 2019-03-21
Payer: COMMERCIAL

## 2019-03-21 DIAGNOSIS — Z00.8 ENCOUNTER FOR OTHER GENERAL EXAMINATION: ICD-10-CM

## 2019-03-21 DIAGNOSIS — K82.9 DISEASE OF GALLBLADDER, UNSPECIFIED: Chronic | ICD-10-CM

## 2019-03-21 DIAGNOSIS — Z98.890 OTHER SPECIFIED POSTPROCEDURAL STATES: Chronic | ICD-10-CM

## 2019-03-21 PROBLEM — G47.30 SLEEP APNEA, UNSPECIFIED: Chronic | Status: ACTIVE | Noted: 2019-03-20

## 2019-03-21 PROCEDURE — 72198 MR ANGIO PELVIS W/O & W/DYE: CPT | Mod: 26

## 2019-03-21 PROCEDURE — C8920: CPT

## 2019-03-21 PROCEDURE — C8902: CPT

## 2019-03-21 PROCEDURE — A9585: CPT

## 2019-03-21 PROCEDURE — 74185 MRA ABD W OR W/O CNTRST: CPT | Mod: 26

## 2019-03-22 ENCOUNTER — APPOINTMENT (OUTPATIENT)
Age: 59
End: 2019-03-22

## 2019-03-26 PROBLEM — Z86.79 PERSONAL HISTORY OF OTHER DISEASES OF THE CIRCULATORY SYSTEM: Chronic | Status: ACTIVE | Noted: 2019-03-20

## 2019-03-26 PROBLEM — M19.90 UNSPECIFIED OSTEOARTHRITIS, UNSPECIFIED SITE: Chronic | Status: ACTIVE | Noted: 2019-03-20

## 2019-03-26 PROBLEM — F41.9 ANXIETY DISORDER, UNSPECIFIED: Chronic | Status: ACTIVE | Noted: 2019-03-20

## 2019-03-26 PROBLEM — E06.3 AUTOIMMUNE THYROIDITIS: Chronic | Status: ACTIVE | Noted: 2019-03-20

## 2019-03-26 PROBLEM — C50.912 MALIGNANT NEOPLASM OF UNSPECIFIED SITE OF LEFT FEMALE BREAST: Chronic | Status: ACTIVE | Noted: 2019-03-20

## 2019-03-26 RX ORDER — SODIUM CHLORIDE 9 MG/ML
3 INJECTION INTRAMUSCULAR; INTRAVENOUS; SUBCUTANEOUS EVERY 8 HOURS
Qty: 0 | Refills: 0 | Status: DISCONTINUED | OUTPATIENT
Start: 2019-03-28 | End: 2019-03-30

## 2019-03-27 ENCOUNTER — RESULT REVIEW (OUTPATIENT)
Age: 59
End: 2019-03-27

## 2019-03-27 ENCOUNTER — APPOINTMENT (OUTPATIENT)
Age: 59
End: 2019-03-27

## 2019-03-27 ENCOUNTER — INPATIENT (INPATIENT)
Facility: HOSPITAL | Age: 59
LOS: 2 days | Discharge: HOME HEALTH | End: 2019-03-30
Attending: SURGERY | Admitting: SURGERY
Payer: COMMERCIAL

## 2019-03-27 VITALS
OXYGEN SATURATION: 96 % | TEMPERATURE: 98 F | WEIGHT: 231.93 LBS | DIASTOLIC BLOOD PRESSURE: 70 MMHG | HEIGHT: 65 IN | SYSTOLIC BLOOD PRESSURE: 123 MMHG | RESPIRATION RATE: 16 BRPM | HEART RATE: 86 BPM

## 2019-03-27 DIAGNOSIS — C50.919 MALIGNANT NEOPLASM OF UNSPECIFIED SITE OF UNSPECIFIED FEMALE BREAST: ICD-10-CM

## 2019-03-27 DIAGNOSIS — K82.9 DISEASE OF GALLBLADDER, UNSPECIFIED: Chronic | ICD-10-CM

## 2019-03-27 DIAGNOSIS — Z98.890 OTHER SPECIFIED POSTPROCEDURAL STATES: Chronic | ICD-10-CM

## 2019-03-27 LAB
ANION GAP SERPL CALC-SCNC: 4 MMOL/L — LOW (ref 5–17)
BUN SERPL-MCNC: 12 MG/DL — SIGNIFICANT CHANGE UP (ref 7–23)
CALCIUM SERPL-MCNC: 8.2 MG/DL — LOW (ref 8.5–10.1)
CHLORIDE SERPL-SCNC: 109 MMOL/L — HIGH (ref 96–108)
CO2 SERPL-SCNC: 24 MMOL/L — SIGNIFICANT CHANGE UP (ref 22–31)
CREAT SERPL-MCNC: 0.77 MG/DL — SIGNIFICANT CHANGE UP (ref 0.5–1.3)
GLUCOSE SERPL-MCNC: 178 MG/DL — HIGH (ref 70–99)
HCT VFR BLD CALC: 35.2 % — SIGNIFICANT CHANGE UP (ref 34.5–45)
HGB BLD-MCNC: 11.8 G/DL — SIGNIFICANT CHANGE UP (ref 11.5–15.5)
MCHC RBC-ENTMCNC: 33.1 PG — SIGNIFICANT CHANGE UP (ref 27–34)
MCHC RBC-ENTMCNC: 33.5 GM/DL — SIGNIFICANT CHANGE UP (ref 32–36)
MCV RBC AUTO: 98.9 FL — SIGNIFICANT CHANGE UP (ref 80–100)
NRBC # BLD: 0 /100 WBCS — SIGNIFICANT CHANGE UP (ref 0–0)
PLATELET # BLD AUTO: 182 K/UL — SIGNIFICANT CHANGE UP (ref 150–400)
POTASSIUM SERPL-MCNC: 4.8 MMOL/L — SIGNIFICANT CHANGE UP (ref 3.5–5.3)
POTASSIUM SERPL-SCNC: 4.8 MMOL/L — SIGNIFICANT CHANGE UP (ref 3.5–5.3)
RBC # BLD: 3.56 M/UL — LOW (ref 3.8–5.2)
RBC # FLD: 13.4 % — SIGNIFICANT CHANGE UP (ref 10.3–14.5)
SODIUM SERPL-SCNC: 137 MMOL/L — SIGNIFICANT CHANGE UP (ref 135–145)
WBC # BLD: 16.42 K/UL — HIGH (ref 3.8–10.5)
WBC # FLD AUTO: 16.42 K/UL — HIGH (ref 3.8–10.5)

## 2019-03-27 PROCEDURE — 88304 TISSUE EXAM BY PATHOLOGIST: CPT | Mod: 26

## 2019-03-27 PROCEDURE — 88307 TISSUE EXAM BY PATHOLOGIST: CPT | Mod: 26

## 2019-03-27 PROCEDURE — S2068: CPT | Mod: LT,62

## 2019-03-27 PROCEDURE — 19303 MAST SIMPLE COMPLETE: CPT | Mod: AS,50

## 2019-03-27 PROCEDURE — 88305 TISSUE EXAM BY PATHOLOGIST: CPT | Mod: 26

## 2019-03-27 PROCEDURE — 88333 PATH CONSLTJ SURG CYTO XM 1: CPT | Mod: 26

## 2019-03-27 PROCEDURE — 38530 BIOPSY/REMOVAL LYMPH NODES: CPT | Mod: LT

## 2019-03-27 PROCEDURE — 38530 BIOPSY/REMOVAL LYMPH NODES: CPT | Mod: RT

## 2019-03-27 PROCEDURE — 38525 BIOPSY/REMOVAL LYMPH NODES: CPT | Mod: LT

## 2019-03-27 PROCEDURE — 38792 RA TRACER ID OF SENTINL NODE: CPT | Mod: LT

## 2019-03-27 PROCEDURE — 19303 MAST SIMPLE COMPLETE: CPT | Mod: 50

## 2019-03-27 RX ORDER — FAMOTIDINE 10 MG/ML
20 INJECTION INTRAVENOUS ONCE
Qty: 0 | Refills: 0 | Status: COMPLETED | OUTPATIENT
Start: 2019-03-27 | End: 2019-03-27

## 2019-03-27 RX ORDER — VANCOMYCIN HCL 1 G
1000 VIAL (EA) INTRAVENOUS EVERY 24 HOURS
Qty: 0 | Refills: 0 | Status: DISCONTINUED | OUTPATIENT
Start: 2019-03-27 | End: 2019-03-30

## 2019-03-27 RX ORDER — KETOROLAC TROMETHAMINE 30 MG/ML
15 SYRINGE (ML) INJECTION EVERY 6 HOURS
Qty: 0 | Refills: 0 | Status: DISCONTINUED | OUTPATIENT
Start: 2019-03-27 | End: 2019-03-30

## 2019-03-27 RX ORDER — ACETAMINOPHEN 500 MG
975 TABLET ORAL ONCE
Qty: 0 | Refills: 0 | Status: COMPLETED | OUTPATIENT
Start: 2019-03-27 | End: 2019-03-27

## 2019-03-27 RX ORDER — ESCITALOPRAM OXALATE 10 MG/1
1 TABLET, FILM COATED ORAL
Qty: 0 | Refills: 0 | COMMUNITY

## 2019-03-27 RX ORDER — SODIUM CHLORIDE 9 MG/ML
1000 INJECTION, SOLUTION INTRAVENOUS
Qty: 0 | Refills: 0 | Status: DISCONTINUED | OUTPATIENT
Start: 2019-03-27 | End: 2019-03-28

## 2019-03-27 RX ORDER — SODIUM CHLORIDE 9 MG/ML
1000 INJECTION INTRAMUSCULAR; INTRAVENOUS; SUBCUTANEOUS
Qty: 0 | Refills: 0 | Status: DISCONTINUED | OUTPATIENT
Start: 2019-03-27 | End: 2019-03-28

## 2019-03-27 RX ORDER — ONDANSETRON 8 MG/1
4 TABLET, FILM COATED ORAL EVERY 6 HOURS
Qty: 0 | Refills: 0 | Status: DISCONTINUED | OUTPATIENT
Start: 2019-03-27 | End: 2019-03-28

## 2019-03-27 RX ORDER — ACETAMINOPHEN 500 MG
1000 TABLET ORAL ONCE
Qty: 0 | Refills: 0 | Status: COMPLETED | OUTPATIENT
Start: 2019-03-28 | End: 2019-03-28

## 2019-03-27 RX ORDER — HYDROMORPHONE HYDROCHLORIDE 2 MG/ML
0.5 INJECTION INTRAMUSCULAR; INTRAVENOUS; SUBCUTANEOUS EVERY 4 HOURS
Qty: 0 | Refills: 0 | Status: DISCONTINUED | OUTPATIENT
Start: 2019-03-27 | End: 2019-03-28

## 2019-03-27 RX ORDER — FENTANYL CITRATE 50 UG/ML
50 INJECTION INTRAVENOUS
Qty: 0 | Refills: 0 | Status: DISCONTINUED | OUTPATIENT
Start: 2019-03-27 | End: 2019-03-28

## 2019-03-27 RX ORDER — HEPARIN SODIUM 5000 [USP'U]/ML
5000 INJECTION INTRAVENOUS; SUBCUTANEOUS EVERY 8 HOURS
Qty: 0 | Refills: 0 | Status: DISCONTINUED | OUTPATIENT
Start: 2019-03-27 | End: 2019-03-30

## 2019-03-27 RX ORDER — ONDANSETRON 8 MG/1
4 TABLET, FILM COATED ORAL ONCE
Qty: 0 | Refills: 0 | Status: DISCONTINUED | OUTPATIENT
Start: 2019-03-27 | End: 2019-03-28

## 2019-03-27 RX ORDER — VANCOMYCIN HCL 1 G
1000 VIAL (EA) INTRAVENOUS ONCE
Qty: 0 | Refills: 0 | Status: COMPLETED | OUTPATIENT
Start: 2019-03-27 | End: 2019-03-27

## 2019-03-27 RX ORDER — OXYCODONE HYDROCHLORIDE 5 MG/1
10 TABLET ORAL EVERY 6 HOURS
Qty: 0 | Refills: 0 | Status: DISCONTINUED | OUTPATIENT
Start: 2019-03-27 | End: 2019-03-30

## 2019-03-27 RX ADMIN — Medication 975 MILLIGRAM(S): at 08:29

## 2019-03-27 RX ADMIN — Medication 975 MILLIGRAM(S): at 08:30

## 2019-03-27 RX ADMIN — SODIUM CHLORIDE 125 MILLILITER(S): 9 INJECTION, SOLUTION INTRAVENOUS at 22:58

## 2019-03-27 RX ADMIN — Medication 250 MILLIGRAM(S): at 22:58

## 2019-03-27 RX ADMIN — FAMOTIDINE 20 MILLIGRAM(S): 10 INJECTION INTRAVENOUS at 08:29

## 2019-03-27 NOTE — PATIENT PROFILE ADULT - NSPROEDALEARNPREF_GEN_A_NUR
pictorial/skill demonstration/verbal instruction/video/written material/audio/computer/internet/individual instruction/group instruction

## 2019-03-27 NOTE — BRIEF OPERATIVE NOTE - NSICDXBRIEFPROCEDURE_GEN_ALL_CORE_FT
PROCEDURES:  Reconstruction, breast, bilateral, using SUNNI flap 27-Mar-2019 21:32:37  Mary Jo Waller

## 2019-03-28 LAB
ANION GAP SERPL CALC-SCNC: 6 MMOL/L — SIGNIFICANT CHANGE UP (ref 5–17)
BLD GP AB SCN SERPL QL: SIGNIFICANT CHANGE UP
BUN SERPL-MCNC: 14 MG/DL — SIGNIFICANT CHANGE UP (ref 7–23)
CALCIUM SERPL-MCNC: 7.8 MG/DL — LOW (ref 8.5–10.1)
CHLORIDE SERPL-SCNC: 109 MMOL/L — HIGH (ref 96–108)
CK SERPL-CCNC: 447 U/L — HIGH (ref 26–192)
CO2 SERPL-SCNC: 25 MMOL/L — SIGNIFICANT CHANGE UP (ref 22–31)
CREAT SERPL-MCNC: 0.73 MG/DL — SIGNIFICANT CHANGE UP (ref 0.5–1.3)
GLUCOSE SERPL-MCNC: 127 MG/DL — HIGH (ref 70–99)
HCT VFR BLD CALC: 28.3 % — LOW (ref 34.5–45)
HCT VFR BLD CALC: 29.8 % — LOW (ref 34.5–45)
HGB BLD-MCNC: 9.2 G/DL — LOW (ref 11.5–15.5)
HGB BLD-MCNC: 9.6 G/DL — LOW (ref 11.5–15.5)
MCHC RBC-ENTMCNC: 32.2 GM/DL — SIGNIFICANT CHANGE UP (ref 32–36)
MCHC RBC-ENTMCNC: 32.3 PG — SIGNIFICANT CHANGE UP (ref 27–34)
MCHC RBC-ENTMCNC: 32.5 GM/DL — SIGNIFICANT CHANGE UP (ref 32–36)
MCHC RBC-ENTMCNC: 32.6 PG — SIGNIFICANT CHANGE UP (ref 27–34)
MCV RBC AUTO: 100.3 FL — HIGH (ref 80–100)
MCV RBC AUTO: 100.4 FL — HIGH (ref 80–100)
NRBC # BLD: 0 /100 WBCS — SIGNIFICANT CHANGE UP (ref 0–0)
NRBC # BLD: 0 /100 WBCS — SIGNIFICANT CHANGE UP (ref 0–0)
PLATELET # BLD AUTO: 149 K/UL — LOW (ref 150–400)
PLATELET # BLD AUTO: 165 K/UL — SIGNIFICANT CHANGE UP (ref 150–400)
POTASSIUM SERPL-MCNC: 4.8 MMOL/L — SIGNIFICANT CHANGE UP (ref 3.5–5.3)
POTASSIUM SERPL-SCNC: 4.8 MMOL/L — SIGNIFICANT CHANGE UP (ref 3.5–5.3)
RBC # BLD: 2.82 M/UL — LOW (ref 3.8–5.2)
RBC # BLD: 2.97 M/UL — LOW (ref 3.8–5.2)
RBC # FLD: 13.5 % — SIGNIFICANT CHANGE UP (ref 10.3–14.5)
RBC # FLD: 13.6 % — SIGNIFICANT CHANGE UP (ref 10.3–14.5)
SODIUM SERPL-SCNC: 140 MMOL/L — SIGNIFICANT CHANGE UP (ref 135–145)
TROPONIN I SERPL-MCNC: <0.015 NG/ML — SIGNIFICANT CHANGE UP (ref 0.01–0.04)
TYPE + AB SCN PNL BLD: SIGNIFICANT CHANGE UP
WBC # BLD: 10.78 K/UL — HIGH (ref 3.8–10.5)
WBC # BLD: 12.23 K/UL — HIGH (ref 3.8–10.5)
WBC # FLD AUTO: 10.78 K/UL — HIGH (ref 3.8–10.5)
WBC # FLD AUTO: 12.23 K/UL — HIGH (ref 3.8–10.5)

## 2019-03-28 PROCEDURE — 99231 SBSQ HOSP IP/OBS SF/LOW 25: CPT

## 2019-03-28 PROCEDURE — 93010 ELECTROCARDIOGRAM REPORT: CPT

## 2019-03-28 RX ORDER — LOSARTAN POTASSIUM 100 MG/1
25 TABLET, FILM COATED ORAL DAILY
Qty: 0 | Refills: 0 | Status: DISCONTINUED | OUTPATIENT
Start: 2019-03-28 | End: 2019-03-29

## 2019-03-28 RX ORDER — ACETAMINOPHEN 500 MG
1000 TABLET ORAL ONCE
Qty: 0 | Refills: 0 | Status: COMPLETED | OUTPATIENT
Start: 2019-03-29 | End: 2019-03-29

## 2019-03-28 RX ORDER — ACETAMINOPHEN 500 MG
1000 TABLET ORAL ONCE
Qty: 0 | Refills: 0 | Status: COMPLETED | OUTPATIENT
Start: 2019-03-28 | End: 2019-03-28

## 2019-03-28 RX ORDER — ZOLPIDEM TARTRATE 10 MG/1
5 TABLET ORAL AT BEDTIME
Qty: 0 | Refills: 0 | Status: DISCONTINUED | OUTPATIENT
Start: 2019-03-28 | End: 2019-03-30

## 2019-03-28 RX ORDER — ONDANSETRON 8 MG/1
4 TABLET, FILM COATED ORAL EVERY 4 HOURS
Qty: 0 | Refills: 0 | Status: DISCONTINUED | OUTPATIENT
Start: 2019-03-28 | End: 2019-03-30

## 2019-03-28 RX ORDER — SODIUM CHLORIDE 9 MG/ML
500 INJECTION INTRAMUSCULAR; INTRAVENOUS; SUBCUTANEOUS ONCE
Qty: 0 | Refills: 0 | Status: COMPLETED | OUTPATIENT
Start: 2019-03-28 | End: 2019-03-28

## 2019-03-28 RX ORDER — PROCHLORPERAZINE MALEATE 5 MG
10 TABLET ORAL EVERY 6 HOURS
Qty: 0 | Refills: 0 | Status: DISCONTINUED | OUTPATIENT
Start: 2019-03-28 | End: 2019-03-28

## 2019-03-28 RX ORDER — ONDANSETRON 8 MG/1
4 TABLET, FILM COATED ORAL EVERY 4 HOURS
Qty: 0 | Refills: 0 | Status: DISCONTINUED | OUTPATIENT
Start: 2019-03-28 | End: 2019-03-28

## 2019-03-28 RX ORDER — ONDANSETRON 8 MG/1
4 TABLET, FILM COATED ORAL EVERY 6 HOURS
Qty: 0 | Refills: 0 | Status: DISCONTINUED | OUTPATIENT
Start: 2019-03-28 | End: 2019-03-30

## 2019-03-28 RX ORDER — ESCITALOPRAM OXALATE 10 MG/1
20 TABLET, FILM COATED ORAL DAILY
Qty: 0 | Refills: 0 | Status: DISCONTINUED | OUTPATIENT
Start: 2019-03-28 | End: 2019-03-30

## 2019-03-28 RX ORDER — PROCHLORPERAZINE MALEATE 5 MG
10 TABLET ORAL EVERY 6 HOURS
Qty: 0 | Refills: 0 | Status: DISCONTINUED | OUTPATIENT
Start: 2019-03-28 | End: 2019-03-30

## 2019-03-28 RX ORDER — METOPROLOL TARTRATE 50 MG
25 TABLET ORAL DAILY
Qty: 0 | Refills: 0 | Status: DISCONTINUED | OUTPATIENT
Start: 2019-03-28 | End: 2019-03-29

## 2019-03-28 RX ADMIN — SODIUM CHLORIDE 1000 MILLILITER(S): 9 INJECTION INTRAMUSCULAR; INTRAVENOUS; SUBCUTANEOUS at 13:17

## 2019-03-28 RX ADMIN — Medication 1000 MILLIGRAM(S): at 12:45

## 2019-03-28 RX ADMIN — Medication 15 MILLIGRAM(S): at 01:49

## 2019-03-28 RX ADMIN — Medication 1000 MILLIGRAM(S): at 19:20

## 2019-03-28 RX ADMIN — SODIUM CHLORIDE 3 MILLILITER(S): 9 INJECTION INTRAMUSCULAR; INTRAVENOUS; SUBCUTANEOUS at 17:36

## 2019-03-28 RX ADMIN — Medication 15 MILLIGRAM(S): at 02:30

## 2019-03-28 RX ADMIN — Medication 15 MILLIGRAM(S): at 14:26

## 2019-03-28 RX ADMIN — Medication 1000 MILLIGRAM(S): at 02:30

## 2019-03-28 RX ADMIN — Medication 400 MILLIGRAM(S): at 12:29

## 2019-03-28 RX ADMIN — OXYCODONE HYDROCHLORIDE 10 MILLIGRAM(S): 5 TABLET ORAL at 05:36

## 2019-03-28 RX ADMIN — OXYCODONE HYDROCHLORIDE 10 MILLIGRAM(S): 5 TABLET ORAL at 06:30

## 2019-03-28 RX ADMIN — Medication 15 MILLIGRAM(S): at 14:50

## 2019-03-28 RX ADMIN — ESCITALOPRAM OXALATE 20 MILLIGRAM(S): 10 TABLET, FILM COATED ORAL at 12:32

## 2019-03-28 RX ADMIN — Medication 400 MILLIGRAM(S): at 19:08

## 2019-03-28 RX ADMIN — Medication 15 MILLIGRAM(S): at 21:08

## 2019-03-28 RX ADMIN — SODIUM CHLORIDE 125 MILLILITER(S): 9 INJECTION, SOLUTION INTRAVENOUS at 05:36

## 2019-03-28 RX ADMIN — Medication 15 MILLIGRAM(S): at 09:04

## 2019-03-28 RX ADMIN — Medication 15 MILLIGRAM(S): at 21:38

## 2019-03-28 RX ADMIN — ONDANSETRON 4 MILLIGRAM(S): 8 TABLET, FILM COATED ORAL at 17:37

## 2019-03-28 RX ADMIN — HEPARIN SODIUM 5000 UNIT(S): 5000 INJECTION INTRAVENOUS; SUBCUTANEOUS at 14:26

## 2019-03-28 RX ADMIN — Medication 400 MILLIGRAM(S): at 01:49

## 2019-03-28 RX ADMIN — HEPARIN SODIUM 5000 UNIT(S): 5000 INJECTION INTRAVENOUS; SUBCUTANEOUS at 05:36

## 2019-03-28 RX ADMIN — Medication 15 MILLIGRAM(S): at 09:18

## 2019-03-28 NOTE — CHART NOTE - NSCHARTNOTEFT_GEN_A_CORE
Pt seen and examined after bolus manul bp is now 100/65 cbc is                          9.2    10.78 )-----------( 149      ( 28 Mar 2019 13:28 )             28.3       pt currently in chair without complaints will continue to monitor

## 2019-03-28 NOTE — CHART NOTE - NSCHARTNOTEFT_GEN_A_CORE
Pt seen and examined with out complaints . Called to see for hypotension.  Pt has not received her bp meds today.  Bolus is currently being given.    ICU Vital Signs Last 24 Hrs  T(C): 36.4 (28 Mar 2019 12:50), Max: 37.1 (28 Mar 2019 06:00)  T(F): 97.6 (28 Mar 2019 12:50), Max: 98.7 (28 Mar 2019 06:00)  HR: 92 (28 Mar 2019 12:50) (83 - 93)  BP: 83/46 (28 Mar 2019 12:50) (83/46 - 149/74)  BP(mean): --  ABP: --  ABP(mean): --  RR: 16 (28 Mar 2019 12:50) (15 - 19)  SpO2: 99% (28 Mar 2019 12:50) (91% - 100%)                            9.6    12.23 )-----------( 165      ( 28 Mar 2019 07:02 )             29.8       PE: resting comfortably  heart: s1,s2  lungs: clear  Bilat breast: soft, light bilat bruising, jps in place, bioptics in place and numbers are unchanged.    Jalen out put 1-20cc, 2-25cc, 3-20cc, 4-25cc  uo: is 700cc since 7am and pt is receiving 125cc/hour    A/P: post op hypotension  -continue bolus and recheck bp  -check cbc  -continue to monitor uo and drain output

## 2019-03-29 LAB
ANION GAP SERPL CALC-SCNC: 7 MMOL/L — SIGNIFICANT CHANGE UP (ref 5–17)
BUN SERPL-MCNC: 23 MG/DL — SIGNIFICANT CHANGE UP (ref 7–23)
CALCIUM SERPL-MCNC: 7.8 MG/DL — LOW (ref 8.5–10.1)
CHLORIDE SERPL-SCNC: 108 MMOL/L — SIGNIFICANT CHANGE UP (ref 96–108)
CO2 SERPL-SCNC: 25 MMOL/L — SIGNIFICANT CHANGE UP (ref 22–31)
CREAT SERPL-MCNC: 0.77 MG/DL — SIGNIFICANT CHANGE UP (ref 0.5–1.3)
GLUCOSE SERPL-MCNC: 129 MG/DL — HIGH (ref 70–99)
HCT VFR BLD CALC: 27.9 % — LOW (ref 34.5–45)
HGB BLD-MCNC: 9.2 G/DL — LOW (ref 11.5–15.5)
MCHC RBC-ENTMCNC: 32.3 PG — SIGNIFICANT CHANGE UP (ref 27–34)
MCHC RBC-ENTMCNC: 33 GM/DL — SIGNIFICANT CHANGE UP (ref 32–36)
MCV RBC AUTO: 97.9 FL — SIGNIFICANT CHANGE UP (ref 80–100)
NRBC # BLD: 0 /100 WBCS — SIGNIFICANT CHANGE UP (ref 0–0)
PLATELET # BLD AUTO: 143 K/UL — LOW (ref 150–400)
POTASSIUM SERPL-MCNC: 3.7 MMOL/L — SIGNIFICANT CHANGE UP (ref 3.5–5.3)
POTASSIUM SERPL-SCNC: 3.7 MMOL/L — SIGNIFICANT CHANGE UP (ref 3.5–5.3)
RBC # BLD: 2.85 M/UL — LOW (ref 3.8–5.2)
RBC # FLD: 16.1 % — HIGH (ref 10.3–14.5)
SODIUM SERPL-SCNC: 140 MMOL/L — SIGNIFICANT CHANGE UP (ref 135–145)
WBC # BLD: 7.94 K/UL — SIGNIFICANT CHANGE UP (ref 3.8–10.5)
WBC # FLD AUTO: 7.94 K/UL — SIGNIFICANT CHANGE UP (ref 3.8–10.5)

## 2019-03-29 RX ORDER — PREGABALIN 225 MG/1
500 CAPSULE ORAL DAILY
Qty: 0 | Refills: 0 | Status: DISCONTINUED | OUTPATIENT
Start: 2019-03-29 | End: 2019-03-30

## 2019-03-29 RX ORDER — SENNA PLUS 8.6 MG/1
2 TABLET ORAL AT BEDTIME
Qty: 0 | Refills: 0 | Status: DISCONTINUED | OUTPATIENT
Start: 2019-03-29 | End: 2019-03-30

## 2019-03-29 RX ORDER — CALCIUM CARBONATE 500(1250)
1 TABLET ORAL THREE TIMES A DAY
Qty: 0 | Refills: 0 | Status: DISCONTINUED | OUTPATIENT
Start: 2019-03-29 | End: 2019-03-30

## 2019-03-29 RX ORDER — PREGABALIN 225 MG/1
1 CAPSULE ORAL
Qty: 0 | Refills: 0 | COMMUNITY

## 2019-03-29 RX ORDER — DOCUSATE SODIUM 100 MG
100 CAPSULE ORAL
Qty: 0 | Refills: 0 | Status: DISCONTINUED | OUTPATIENT
Start: 2019-03-29 | End: 2019-03-30

## 2019-03-29 RX ORDER — PYRIDOXINE HCL (VITAMIN B6) 100 MG
2 TABLET ORAL
Qty: 0 | Refills: 0 | COMMUNITY

## 2019-03-29 RX ORDER — GABAPENTIN 400 MG/1
400 CAPSULE ORAL
Qty: 0 | Refills: 0 | Status: DISCONTINUED | OUTPATIENT
Start: 2019-03-29 | End: 2019-03-30

## 2019-03-29 RX ORDER — METOPROLOL TARTRATE 50 MG
12.5 TABLET ORAL DAILY
Qty: 0 | Refills: 0 | Status: DISCONTINUED | OUTPATIENT
Start: 2019-03-29 | End: 2019-03-30

## 2019-03-29 RX ORDER — ESCITALOPRAM OXALATE 10 MG/1
1 TABLET, FILM COATED ORAL
Qty: 0 | Refills: 0 | COMMUNITY

## 2019-03-29 RX ORDER — TRAMADOL HYDROCHLORIDE 50 MG/1
50 TABLET ORAL EVERY 6 HOURS
Qty: 0 | Refills: 0 | Status: DISCONTINUED | OUTPATIENT
Start: 2019-03-29 | End: 2019-03-30

## 2019-03-29 RX ORDER — PYRIDOXINE HCL (VITAMIN B6) 100 MG
100 TABLET ORAL DAILY
Qty: 0 | Refills: 0 | Status: DISCONTINUED | OUTPATIENT
Start: 2019-03-29 | End: 2019-03-30

## 2019-03-29 RX ORDER — ZOLPIDEM TARTRATE 10 MG/1
1 TABLET ORAL
Qty: 0 | Refills: 0 | COMMUNITY

## 2019-03-29 RX ORDER — GABAPENTIN 400 MG/1
1 CAPSULE ORAL
Qty: 0 | Refills: 0 | COMMUNITY

## 2019-03-29 RX ADMIN — TRAMADOL HYDROCHLORIDE 50 MILLIGRAM(S): 50 TABLET ORAL at 22:02

## 2019-03-29 RX ADMIN — SENNA PLUS 2 TABLET(S): 8.6 TABLET ORAL at 21:04

## 2019-03-29 RX ADMIN — Medication 1000 MILLIGRAM(S): at 02:05

## 2019-03-29 RX ADMIN — HEPARIN SODIUM 5000 UNIT(S): 5000 INJECTION INTRAVENOUS; SUBCUTANEOUS at 21:02

## 2019-03-29 RX ADMIN — Medication 15 MILLIGRAM(S): at 18:48

## 2019-03-29 RX ADMIN — Medication 250 MILLIGRAM(S): at 02:04

## 2019-03-29 RX ADMIN — Medication 15 MILLIGRAM(S): at 11:28

## 2019-03-29 RX ADMIN — GABAPENTIN 400 MILLIGRAM(S): 400 CAPSULE ORAL at 18:49

## 2019-03-29 RX ADMIN — SODIUM CHLORIDE 3 MILLILITER(S): 9 INJECTION INTRAMUSCULAR; INTRAVENOUS; SUBCUTANEOUS at 06:21

## 2019-03-29 RX ADMIN — SODIUM CHLORIDE 3 MILLILITER(S): 9 INJECTION INTRAMUSCULAR; INTRAVENOUS; SUBCUTANEOUS at 00:44

## 2019-03-29 RX ADMIN — Medication 400 MILLIGRAM(S): at 13:39

## 2019-03-29 RX ADMIN — SODIUM CHLORIDE 3 MILLILITER(S): 9 INJECTION INTRAMUSCULAR; INTRAVENOUS; SUBCUTANEOUS at 21:05

## 2019-03-29 RX ADMIN — HEPARIN SODIUM 5000 UNIT(S): 5000 INJECTION INTRAVENOUS; SUBCUTANEOUS at 16:09

## 2019-03-29 RX ADMIN — Medication 100 MILLIGRAM(S): at 18:49

## 2019-03-29 RX ADMIN — Medication 15 MILLIGRAM(S): at 03:53

## 2019-03-29 RX ADMIN — Medication 15 MILLIGRAM(S): at 19:03

## 2019-03-29 RX ADMIN — TRAMADOL HYDROCHLORIDE 50 MILLIGRAM(S): 50 TABLET ORAL at 21:03

## 2019-03-29 RX ADMIN — ESCITALOPRAM OXALATE 20 MILLIGRAM(S): 10 TABLET, FILM COATED ORAL at 11:15

## 2019-03-29 RX ADMIN — Medication 15 MILLIGRAM(S): at 23:50

## 2019-03-29 RX ADMIN — Medication 400 MILLIGRAM(S): at 01:35

## 2019-03-29 RX ADMIN — HEPARIN SODIUM 5000 UNIT(S): 5000 INJECTION INTRAVENOUS; SUBCUTANEOUS at 07:07

## 2019-03-29 RX ADMIN — Medication 1 TABLET(S): at 16:08

## 2019-03-29 RX ADMIN — Medication 15 MILLIGRAM(S): at 11:14

## 2019-03-29 RX ADMIN — PREGABALIN 500 MICROGRAM(S): 225 CAPSULE ORAL at 18:48

## 2019-03-29 RX ADMIN — Medication 1000 MILLIGRAM(S): at 13:51

## 2019-03-29 RX ADMIN — SODIUM CHLORIDE 3 MILLILITER(S): 9 INJECTION INTRAMUSCULAR; INTRAVENOUS; SUBCUTANEOUS at 16:10

## 2019-03-29 NOTE — CONSULT NOTE ADULT - ASSESSMENT
Assessment/Plan:   57 yo postmenopausal female with PMH of Left breast ca  s/p bilateral mastectomy with L SLNB with ALND secondary to failure to map and bilateral SUNNI reconstruction,  HTN, Hashimoto thyroiditis, Chemo induced neuropathy , Depression with anxiety. Patient reports 6/10 incisional pain, well controlled with tylenol and toradol prn.  She is doing well and had an episode of hypotension this morning,  -110 however there is no evidence of bleeding or hematoma.   Patient denies cp, dizziness, palpitations, afebrile, + constipation.    * Left breast CA s/p mastectomy and reconstruction  - as per plastic surgery  - o/p follow up with oncology     * HTN, overcontrolled with episodes of low BP  - stop telmisartan  - lower dose of BB 12.5   - monitor BP    * Neuropathy  - c/w vit B supplements    * Constipation  - add laxatives    * Mild anemia , macrocytosis  - s/p 1 unit of PRBC  - check B12, folate , ferritin, iron  - monitor      DVT PPX: as per surgery     Advance Directive: full code    Disposition: thank you for consult .     Time Span: 70 min

## 2019-03-29 NOTE — CONSULT NOTE ADULT - SUBJECTIVE AND OBJECTIVE BOX
Chief Complaint: incisional pain     HPI:       59 yo postmenopausal female with PMH of Left breast ca  s/p bilateral mastectomy with L SLNB with ALND secondary to failure to map and bilateral SUNNI reconstruction,  HTN, Hashimoto thyroiditis, Chemo induced neuropathy , Depression with anxiety. She is doing well and had an episode of hypotension this morning,  -110 however there is no evidence of bleeding or hematoma.   1. Continue to monitor  2. OOB and ambulate  3. Repeat vitals  4. PO Pain medication  5. Stool softeners  6. If stable, may DC home in AM      REVIEW OF SYSTEMS:    CONSTITUTIONAL: No weakness, fevers or chills  EYES/ENT: No visual changes;  No vertigo or throat pain   NECK: No pain or stiffness  RESPIRATORY: No cough, wheezing, hemoptysis; No shortness of breath  CARDIOVASCULAR: No chest pain or palpitations  GASTROINTESTINAL: No abdominal or epigastric pain. No nausea, vomiting, or hematemesis; No diarrhea or constipation. No melena or hematochezia.  GENITOURINARY: No dysuria, frequency or hematuria  NEUROLOGICAL: No numbness or weakness  SKIN: No itching, burning, rashes, or lesions   All other review of systems is negative unless indicated above    PAST MEDICAL & SURGICAL HISTORY:  Arthritis  Sleep apnea: unable to tolerate CPAP  Hashimoto's disease  Breast cancer, left  Anxiety  H/O: HTN (hypertension)  Gallbladder disease: hx open cholecystectomy 1980&#x27;s  H/O arthroscopy of knee: Left knee- 1980&#x27;s        Vital Signs Last 24 Hrs  T(C): 36.7 (29 Mar 2019 11:15), Max: 37.1 (29 Mar 2019 04:50)  T(F): 98.1 (29 Mar 2019 11:15), Max: 98.7 (29 Mar 2019 04:50)  HR: 103 (29 Mar 2019 11:15) (92 - 110)  BP: 108/50 (29 Mar 2019 11:15) (83/46 - 120/45)  BP(mean): --  RR: 16 (29 Mar 2019 11:15) (16 - 18)  SpO2: 100% (29 Mar 2019 11:15) (93% - 100%)    I&O's Summary    28 Mar 2019 07:01  -  29 Mar 2019 07:00  --------------------------------------------------------  IN: 2700 mL / OUT: 2135 mL / NET: 565 mL        CAPILLARY BLOOD GLUCOSE          PHYSICAL EXAM:    Constitutional: NAD, awake and alert, well-developed  HEENT: PERR, EOMI, Normal Hearing, MMM  Neck: Soft and supple, No LAD, No JVD  Respiratory: Breath sounds are clear bilaterally, No wheezing, rales or rhonchi  Cardiovascular: S1 and S2, regular rate and rhythm, no Murmurs, gallops or rubs  Gastrointestinal: Bowel Sounds present, soft, nontender, nondistended, no guarding, no rebound  Extremities: No peripheral edema  Vascular: 2+ peripheral pulses  Neurological: A/O x 3, no focal deficits  Musculoskeletal: 5/5 strength b/l upper and lower extremities  Skin: No rashes    Medications:  MEDICATIONS  (STANDING):  acetaminophen  IVPB .. 1000 milliGRAM(s) IV Intermittent once  calcium carbonate   1250 mG (OsCal) 1 Tablet(s) Oral three times a day  cyanocobalamin 500 MICROGram(s) Oral daily  escitalopram 20 milliGRAM(s) Oral daily  gabapentin 400 milliGRAM(s) Oral two times a day  heparin  Injectable 5000 Unit(s) SubCutaneous every 8 hours  ketorolac   Injectable 15 milliGRAM(s) IV Push every 6 hours  metoprolol succinate ER 12.5 milliGRAM(s) Oral daily  pyridoxine 100 milliGRAM(s) Oral daily  sodium chloride 0.9% lock flush 3 milliLiter(s) IV Push every 8 hours  vancomycin  IVPB 1000 milliGRAM(s) IV Intermittent every 24 hours      Labs: All Labs Reviewed:                        9.2    7.94  )-----------( 143      ( 29 Mar 2019 08:39 )             27.9     03-29    140  |  108  |  23  ----------------------------<  129<H>  3.7   |  25  |  0.77    Ca    7.8<L>      29 Mar 2019 05:23        CARDIAC MARKERS ( 28 Mar 2019 18:27 )  <0.015 ng/mL / x     / 447 U/L / x     / x          Blood Culture:     RADIOLOGY/EKG:    Assessment/Plan:    DVT PPX:    Advance Directive:    Disposition:    Time Span: Chief Complaint: incisional pain     HPI:       57 yo postmenopausal female with PMH of Left breast ca  s/p bilateral mastectomy with L SLNB with ALND secondary to failure to map and bilateral SUNNI reconstruction,  HTN, Hashimoto thyroiditis, Chemo induced neuropathy , Depression with anxiety. Patient reports 6/10 incisional pain, well controlled with tylenol and toradol prn.  She is doing well and had an episode of hypotension this morning,  -110 however there is no evidence of bleeding or hematoma.   Patient denies cp, dizziness, palpitations, afebrile, + constipation.   REVIEW OF SYSTEMS:  CONSTITUTIONAL: No weakness, fevers or chills  EYES/ENT: No visual changes;  No vertigo or throat pain   NECK: No pain or stiffness  RESPIRATORY: No cough, wheezing, hemoptysis; No shortness of breath  CARDIOVASCULAR: No chest pain or palpitations  GASTROINTESTINAL: No abdominal or epigastric pain. No nausea, vomiting, or hematemesis; No diarrhea, +  constipation. No melena or hematochezia.  GENITOURINARY: No dysuria, frequency or hematuria  NEUROLOGICAL: No numbness or weakness  SKIN: No itching, burning, rashes, or lesions   All other review of systems is negative unless indicated above    PAST MEDICAL & SURGICAL HISTORY:  Arthritis  Sleep apnea: unable to tolerate CPAP  Hashimoto's disease  Breast cancer, left  Anxiety  H/O: HTN (hypertension)  Gallbladder disease: hx open cholecystectomy 1980&#x27;s  H/O arthroscopy of knee: Left knee- 1980&#x27;s    Social history - denies smoking , ETOH use, IVDU  Family history - stoke in the mother, prostate cancer in father        Vital Signs Last 24 Hrs  T(C): 36.7 (29 Mar 2019 11:15), Max: 37.1 (29 Mar 2019 04:50)  T(F): 98.1 (29 Mar 2019 11:15), Max: 98.7 (29 Mar 2019 04:50)  HR: 103 (29 Mar 2019 11:15) (92 - 110)  BP: 108/50 (29 Mar 2019 11:15) (83/46 - 120/45)  BP(mean): --  RR: 16 (29 Mar 2019 11:15) (16 - 18)  SpO2: 100% (29 Mar 2019 11:15) (93% - 100%)    PHYSICAL EXAM:    Constitutional: NAD, awake and alert, well-developed  HEENT: PERR, EOMI, Normal Hearing, MMM  Neck: Soft and supple, No LAD, No JVD  Respiratory: Breath sounds are clear bilaterally, No wheezing, rales or rhonchi  Cardiovascular: S1 and S2, regular rate and rhythm, no Murmurs, gallops or rubs  Gastrointestinal: Bowel Sounds present, soft, nontender, nondistended, no guarding, no rebound  Extremities: No peripheral edema  Vascular: 2+ peripheral pulses  Neurological: A/O x 3, no focal deficits  Musculoskeletal: 5/5 strength b/l upper and lower extremities  Skin: No rashes    Medications:  MEDICATIONS  (STANDING):  acetaminophen  IVPB .. 1000 milliGRAM(s) IV Intermittent once  calcium carbonate   1250 mG (OsCal) 1 Tablet(s) Oral three times a day  cyanocobalamin 500 MICROGram(s) Oral daily  escitalopram 20 milliGRAM(s) Oral daily  gabapentin 400 milliGRAM(s) Oral two times a day  heparin  Injectable 5000 Unit(s) SubCutaneous every 8 hours  ketorolac   Injectable 15 milliGRAM(s) IV Push every 6 hours  metoprolol succinate ER 12.5 milliGRAM(s) Oral daily  pyridoxine 100 milliGRAM(s) Oral daily  sodium chloride 0.9% lock flush 3 milliLiter(s) IV Push every 8 hours  vancomycin  IVPB 1000 milliGRAM(s) IV Intermittent every 24 hours      Labs: All Labs Reviewed:                        9.2    7.94  )-----------( 143      ( 29 Mar 2019 08:39 )             27.9     03-29    140  |  108  |  23  ----------------------------<  129<H>  3.7   |  25  |  0.77    Ca    7.8<L>      29 Mar 2019 05:23        CARDIAC MARKERS ( 28 Mar 2019 18:27 )  <0.015 ng/mL / x     / 447 U/L / x     / x          Blood Culture:     RADIOLOGY/EKG:  EKG - NSR 99, poor R progression

## 2019-03-30 ENCOUNTER — TRANSCRIPTION ENCOUNTER (OUTPATIENT)
Age: 59
End: 2019-03-30

## 2019-03-30 VITALS
SYSTOLIC BLOOD PRESSURE: 129 MMHG | RESPIRATION RATE: 18 BRPM | HEART RATE: 90 BPM | TEMPERATURE: 98 F | DIASTOLIC BLOOD PRESSURE: 70 MMHG | OXYGEN SATURATION: 100 %

## 2019-03-30 LAB
ANION GAP SERPL CALC-SCNC: 5 MMOL/L — SIGNIFICANT CHANGE UP (ref 5–17)
BUN SERPL-MCNC: 13 MG/DL — SIGNIFICANT CHANGE UP (ref 7–23)
CALCIUM SERPL-MCNC: 8.4 MG/DL — LOW (ref 8.5–10.1)
CHLORIDE SERPL-SCNC: 109 MMOL/L — HIGH (ref 96–108)
CO2 SERPL-SCNC: 27 MMOL/L — SIGNIFICANT CHANGE UP (ref 22–31)
CREAT SERPL-MCNC: 0.6 MG/DL — SIGNIFICANT CHANGE UP (ref 0.5–1.3)
FERRITIN SERPL-MCNC: 280 NG/ML — HIGH (ref 15–150)
FOLATE SERPL-MCNC: 7.4 NG/ML — SIGNIFICANT CHANGE UP
GLUCOSE SERPL-MCNC: 103 MG/DL — HIGH (ref 70–99)
HCT VFR BLD CALC: 31.8 % — LOW (ref 34.5–45)
HGB BLD-MCNC: 10.3 G/DL — LOW (ref 11.5–15.5)
IRON SATN MFR SERPL: 21 % — SIGNIFICANT CHANGE UP (ref 14–50)
IRON SATN MFR SERPL: 40 UG/DL — SIGNIFICANT CHANGE UP (ref 30–160)
MCHC RBC-ENTMCNC: 31.2 PG — SIGNIFICANT CHANGE UP (ref 27–34)
MCHC RBC-ENTMCNC: 32.4 GM/DL — SIGNIFICANT CHANGE UP (ref 32–36)
MCV RBC AUTO: 96.4 FL — SIGNIFICANT CHANGE UP (ref 80–100)
NRBC # BLD: 0 /100 WBCS — SIGNIFICANT CHANGE UP (ref 0–0)
PLATELET # BLD AUTO: 157 K/UL — SIGNIFICANT CHANGE UP (ref 150–400)
POTASSIUM SERPL-MCNC: 4.3 MMOL/L — SIGNIFICANT CHANGE UP (ref 3.5–5.3)
POTASSIUM SERPL-SCNC: 4.3 MMOL/L — SIGNIFICANT CHANGE UP (ref 3.5–5.3)
RBC # BLD: 3.3 M/UL — LOW (ref 3.8–5.2)
RBC # FLD: 16.2 % — HIGH (ref 10.3–14.5)
SODIUM SERPL-SCNC: 141 MMOL/L — SIGNIFICANT CHANGE UP (ref 135–145)
TIBC SERPL-MCNC: 187 UG/DL — LOW (ref 220–430)
UIBC SERPL-MCNC: 147 UG/DL — SIGNIFICANT CHANGE UP (ref 110–370)
VIT B12 SERPL-MCNC: 578 PG/ML — SIGNIFICANT CHANGE UP (ref 232–1245)
WBC # BLD: 7.09 K/UL — SIGNIFICANT CHANGE UP (ref 3.8–10.5)
WBC # FLD AUTO: 7.09 K/UL — SIGNIFICANT CHANGE UP (ref 3.8–10.5)

## 2019-03-30 PROCEDURE — 99231 SBSQ HOSP IP/OBS SF/LOW 25: CPT

## 2019-03-30 RX ORDER — TELMISARTAN 20 MG/1
1 TABLET ORAL
Qty: 0 | Refills: 0 | COMMUNITY

## 2019-03-30 RX ORDER — CALCIUM CARBONATE 500(1250)
2 TABLET ORAL
Qty: 0 | Refills: 0 | COMMUNITY
Start: 2019-03-30

## 2019-03-30 RX ORDER — METOPROLOL TARTRATE 50 MG
1 TABLET ORAL
Qty: 0 | Refills: 0 | COMMUNITY

## 2019-03-30 RX ADMIN — Medication 100 MILLIGRAM(S): at 11:38

## 2019-03-30 RX ADMIN — TRAMADOL HYDROCHLORIDE 50 MILLIGRAM(S): 50 TABLET ORAL at 15:24

## 2019-03-30 RX ADMIN — Medication 15 MILLIGRAM(S): at 11:50

## 2019-03-30 RX ADMIN — Medication 12.5 MILLIGRAM(S): at 05:22

## 2019-03-30 RX ADMIN — Medication 250 MILLIGRAM(S): at 02:31

## 2019-03-30 RX ADMIN — Medication 1 TABLET(S): at 15:25

## 2019-03-30 RX ADMIN — Medication 15 MILLIGRAM(S): at 16:42

## 2019-03-30 RX ADMIN — GABAPENTIN 400 MILLIGRAM(S): 400 CAPSULE ORAL at 05:23

## 2019-03-30 RX ADMIN — GABAPENTIN 400 MILLIGRAM(S): 400 CAPSULE ORAL at 16:41

## 2019-03-30 RX ADMIN — Medication 15 MILLIGRAM(S): at 11:37

## 2019-03-30 RX ADMIN — Medication 100 MILLIGRAM(S): at 16:41

## 2019-03-30 RX ADMIN — HEPARIN SODIUM 5000 UNIT(S): 5000 INJECTION INTRAVENOUS; SUBCUTANEOUS at 15:25

## 2019-03-30 RX ADMIN — SODIUM CHLORIDE 3 MILLILITER(S): 9 INJECTION INTRAMUSCULAR; INTRAVENOUS; SUBCUTANEOUS at 05:21

## 2019-03-30 RX ADMIN — PREGABALIN 500 MICROGRAM(S): 225 CAPSULE ORAL at 11:38

## 2019-03-30 RX ADMIN — Medication 15 MILLIGRAM(S): at 00:20

## 2019-03-30 RX ADMIN — ESCITALOPRAM OXALATE 20 MILLIGRAM(S): 10 TABLET, FILM COATED ORAL at 11:38

## 2019-03-30 RX ADMIN — SODIUM CHLORIDE 3 MILLILITER(S): 9 INJECTION INTRAMUSCULAR; INTRAVENOUS; SUBCUTANEOUS at 13:24

## 2019-03-30 RX ADMIN — HEPARIN SODIUM 5000 UNIT(S): 5000 INJECTION INTRAVENOUS; SUBCUTANEOUS at 05:23

## 2019-03-30 RX ADMIN — Medication 15 MILLIGRAM(S): at 06:02

## 2019-03-30 RX ADMIN — Medication 15 MILLIGRAM(S): at 05:23

## 2019-03-30 RX ADMIN — Medication 100 MILLIGRAM(S): at 05:22

## 2019-03-30 NOTE — DISCHARGE NOTE PROVIDER - NSDCCPTREATMENT_GEN_ALL_CORE_FT
PRINCIPAL PROCEDURE  Procedure: Reconstruction, breast, bilateral, using SUNNI flap  Findings and Treatment:       SECONDARY PROCEDURE  Procedure: Bilateral complete mastectomy  Findings and Treatment:

## 2019-03-30 NOTE — DISCHARGE NOTE PROVIDER - CARE PROVIDER_API CALL
Jaxon Hopson (MD)  Plastic Surgery; Surgery; Surgery of the Hand  [address], [address]  [place] [address]  Phone: [phone]  Fax: [phone]  Follow Up Time: 1 week    Katy Riggs)  Surgery  [address] [place] [address]  Phone: [phone]  Fax: [phone]  Follow Up Time: 2 weeks

## 2019-03-30 NOTE — PROGRESS NOTE ADULT - REASON FOR ADMISSION
POD 3 s/p bilateral SUNNI breast reconstruction.
POD 2 s/p bilateral SUNNI breast reconstruction.
bilateral mastectomy and SUNNI flaps
s/p bilateral mastectomy with left SLNB/ALND

## 2019-03-30 NOTE — DISCHARGE NOTE PROVIDER - CARE PROVIDERS DIRECT ADDRESSES
[email or web address]

## 2019-03-30 NOTE — DISCHARGE NOTE PROVIDER - PROVIDER TOKENS
PROVIDER:[TOKEN:[[id number]],FOLLOWUP:[1 week]],PROVIDER:[TOKEN:[[id number]],FOLLOWUP:[2 weeks]]

## 2019-03-30 NOTE — DISCHARGE NOTE PROVIDER - NSDCACTIVITY_GEN_ALL_CORE
Walking - Indoors allowed/Do not make important decisions/Showering allowed/Do not drive or operate machinery/Stairs allowed/No heavy lifting/straining/Walking - Outdoors allowed

## 2019-03-30 NOTE — CHART NOTE - NSCHARTNOTEFT_GEN_A_CORE
Called to see pt for new bruising left lateral breast.   Pt seeen and examined sleeping on arrival.  States she was having some discomfort in zehra breast since yesterday but is unchanged.    ICU Vital Signs Last 24 Hrs  T(C): 37.4 (30 Mar 2019 04:07), Max: 37.4 (30 Mar 2019 04:07)  T(F): 99.4 (30 Mar 2019 04:07), Max: 99.4 (30 Mar 2019 04:07)  HR: 89 (30 Mar 2019 04:07) (89 - 113)  BP: 102/47 (30 Mar 2019 04:07) (102/47 - 134/65)  BP(mean): --  ABP: --  ABP(mean): --  RR: 17 (30 Mar 2019 04:07) (16 - 18)  SpO2: 96% (30 Mar 2019 04:07) (95% - 100%)                            9.2    7.94  )-----------( 143      ( 29 Mar 2019 08:39 )             27.9       elisa out uput this shift   1-40cc  2-40cc  3-30cc  4-40cc      PE: left breast soft, non tender, left later bruising noted no discharge flap normal color  right breast, soft viable flap    a/P: left breast bruising  -will continue to monitor   -will discuses with Dr. Rowland/Mark Anthony

## 2019-03-30 NOTE — DISCHARGE NOTE PROVIDER - NSDCFUADDINST_GEN_ALL_CORE_FT
Please call your primary care doctor on Monday and follow-up for your blood pressure medications that are currently being held: metoprolol, and telsartan.  Please call Dr. Hopson if you feel weak, or light-headed, or dizzy.  Please call Dr. Hopson's office on Monday morning and schedule a follow-up visit for 04/02/19.

## 2019-03-30 NOTE — DISCHARGE NOTE PROVIDER - NSDCCPCAREPLAN_GEN_ALL_CORE_FT
PRINCIPAL DISCHARGE DIAGNOSIS  Diagnosis: S/P breast reconstruction, bilateral  Assessment and Plan of Treatment:       SECONDARY DISCHARGE DIAGNOSES  Diagnosis: Acute anemia  Assessment and Plan of Treatment:     Diagnosis: Breast cancer  Assessment and Plan of Treatment:

## 2019-03-30 NOTE — DISCHARGE NOTE NURSING/CASE MANAGEMENT/SOCIAL WORK - NSDCDPATPORTLINK_GEN_ALL_CORE
You can access the [facility] Patient Portal, offered by [facility], by registering with the following website: [email or web address]

## 2019-03-30 NOTE — PROGRESS NOTE ADULT - ASSESSMENT
57 yo postmenopausal female with PMH of Left breast ca  s/p bilateral mastectomy with L SLNB with ALND secondary to failure to map and bilateral SUNNI reconstruction,  HTN, Hashimoto thyroiditis, Chemo induced neuropathy , Depression with anxiety. Patient reports 6/10 incisional pain, well controlled with tylenol and toradol prn.  She is doing well and had an episode of hypotension this morning,  -110 however there is no evidence of bleeding or hematoma.   Patient denies cp, dizziness, palpitations, afebrile, + constipation.    * Left breast CA s/p mastectomy and reconstruction  - as per plastic surgery  - o/p follow up with oncology     * HTN, overcontrolled with episodes of low BP  - stop telmisartan  - HR better with BB, would recommend continuing lower dose of BB 12.5   - and follow up with outpatient PMD      * Neuropathy  - c/w vit B supplements    * Constipation  - add laxatives    * Mild anemia , macrocytosis  - s/p 1 unit of PRBC  - check B12, folate , ferritin, iron  - monitor      DVT PPX: as per surgery     Advance Directive: full code    Disposition: thank you for consult .     Time Span: 70 min
57 yo postmenopausal female s/p bilateral mastectomy with L SLNB with ALND secondary to failure to map and bilateral SUNNI reconstruction. She is doing well and had an episode of hypotension this morning,  -110 however there is no evidence of bleeding or hematoma.   1. Continue to monitor  2. OOB and ambulate  3. Repeat vitals  4. PO Pain medication  5. Stool softeners  6. If stable, may DC home in AM

## 2019-03-31 NOTE — PROVIDER CONTACT NOTE (OTHER) - SITUATION
DC Note faxed to Dr Rashard Posada at [phone]
pt post op SUNNI, output sanguineous, drained 40cc after ambulating. BP 86/41 .
spoke to [facility] answering service. Is aware of consult.
spoke to office. Is aware of consult

## 2019-04-01 DIAGNOSIS — Z01.818 ENCOUNTER FOR OTHER PREPROCEDURAL EXAMINATION: ICD-10-CM

## 2019-04-01 DIAGNOSIS — C50.912 MALIGNANT NEOPLASM OF UNSPECIFIED SITE OF LEFT FEMALE BREAST: ICD-10-CM

## 2019-04-02 ENCOUNTER — APPOINTMENT (OUTPATIENT)
Age: 59
End: 2019-04-02
Payer: COMMERCIAL

## 2019-04-02 VITALS
DIASTOLIC BLOOD PRESSURE: 85 MMHG | OXYGEN SATURATION: 97 % | RESPIRATION RATE: 16 BRPM | WEIGHT: 242 LBS | BODY MASS INDEX: 40.32 KG/M2 | HEART RATE: 103 BPM | SYSTOLIC BLOOD PRESSURE: 129 MMHG | HEIGHT: 65 IN | TEMPERATURE: 98.2 F

## 2019-04-02 DIAGNOSIS — Z85.3 PERSONAL HISTORY OF MALIGNANT NEOPLASM OF BREAST: ICD-10-CM

## 2019-04-02 PROCEDURE — 99024 POSTOP FOLLOW-UP VISIT: CPT

## 2019-04-03 DIAGNOSIS — G62.9 POLYNEUROPATHY, UNSPECIFIED: ICD-10-CM

## 2019-04-03 DIAGNOSIS — C50.912 MALIGNANT NEOPLASM OF UNSPECIFIED SITE OF LEFT FEMALE BREAST: ICD-10-CM

## 2019-04-03 DIAGNOSIS — K59.00 CONSTIPATION, UNSPECIFIED: ICD-10-CM

## 2019-04-03 DIAGNOSIS — I10 ESSENTIAL (PRIMARY) HYPERTENSION: ICD-10-CM

## 2019-04-03 DIAGNOSIS — D64.9 ANEMIA, UNSPECIFIED: ICD-10-CM

## 2019-04-03 DIAGNOSIS — D75.89 OTHER SPECIFIED DISEASES OF BLOOD AND BLOOD-FORMING ORGANS: ICD-10-CM

## 2019-04-03 DIAGNOSIS — I95.81 POSTPROCEDURAL HYPOTENSION: ICD-10-CM

## 2019-04-03 LAB — SURGICAL PATHOLOGY STUDY: SIGNIFICANT CHANGE UP

## 2019-04-04 ENCOUNTER — APPOINTMENT (OUTPATIENT)
Age: 59
End: 2019-04-04
Payer: COMMERCIAL

## 2019-04-04 ENCOUNTER — RX RENEWAL (OUTPATIENT)
Age: 59
End: 2019-04-04

## 2019-04-04 VITALS
HEART RATE: 114 BPM | HEIGHT: 65 IN | OXYGEN SATURATION: 97 % | TEMPERATURE: 98.2 F | BODY MASS INDEX: 39.99 KG/M2 | DIASTOLIC BLOOD PRESSURE: 94 MMHG | WEIGHT: 240 LBS | SYSTOLIC BLOOD PRESSURE: 139 MMHG

## 2019-04-04 PROCEDURE — 99024 POSTOP FOLLOW-UP VISIT: CPT

## 2019-04-05 ENCOUNTER — RX RENEWAL (OUTPATIENT)
Age: 59
End: 2019-04-05

## 2019-04-08 ENCOUNTER — APPOINTMENT (OUTPATIENT)
Dept: SURGERY | Facility: CLINIC | Age: 59
End: 2019-04-08
Payer: COMMERCIAL

## 2019-04-08 VITALS
WEIGHT: 240 LBS | HEIGHT: 65 IN | HEART RATE: 97 BPM | DIASTOLIC BLOOD PRESSURE: 86 MMHG | SYSTOLIC BLOOD PRESSURE: 119 MMHG | BODY MASS INDEX: 39.99 KG/M2

## 2019-04-08 PROCEDURE — 99024 POSTOP FOLLOW-UP VISIT: CPT

## 2019-04-09 ENCOUNTER — APPOINTMENT (OUTPATIENT)
Age: 59
End: 2019-04-09
Payer: COMMERCIAL

## 2019-04-09 VITALS
OXYGEN SATURATION: 99 % | RESPIRATION RATE: 16 BRPM | BODY MASS INDEX: 39.65 KG/M2 | TEMPERATURE: 98 F | HEART RATE: 81 BPM | DIASTOLIC BLOOD PRESSURE: 83 MMHG | HEIGHT: 65 IN | WEIGHT: 238 LBS | SYSTOLIC BLOOD PRESSURE: 122 MMHG

## 2019-04-09 PROCEDURE — 99024 POSTOP FOLLOW-UP VISIT: CPT

## 2019-04-15 NOTE — HISTORY OF PRESENT ILLNESS
[FreeTextEntry1] : 59 y/o woman with personal history of left breast cancer s/p bilateral mastectomy with SUNNI flap reconstruction on 3/27/19.\par Her drain log revels less than 10cc of output per day in drain number 1 and about 90-100cc output in drain number 2. \par She is doing well and has no complaints today.

## 2019-04-15 NOTE — PHYSICAL EXAM
[de-identified] : Bilateral incisions healing well. No evidence of infection.\par Breasts are soft.\par Drains in place.\par  [de-identified] : Incisions healing well. No evidence of infection.\par Umbilicus viable.\par

## 2019-04-15 NOTE — ASSESSMENT
[FreeTextEntry1] : 59 y/o woman with personal history of left breast cancer s/p bilateral mastectomy with SUNNI flap reconstruction on 3/27/19. She is doing well.  Today we have removed drain #1.  Return to office in one week for removal of last drain.

## 2019-04-16 ENCOUNTER — APPOINTMENT (OUTPATIENT)
Dept: PLASTIC SURGERY | Facility: CLINIC | Age: 59
End: 2019-04-16
Payer: COMMERCIAL

## 2019-04-16 VITALS
TEMPERATURE: 97.9 F | WEIGHT: 238 LBS | HEART RATE: 109 BPM | SYSTOLIC BLOOD PRESSURE: 100 MMHG | OXYGEN SATURATION: 94 % | HEIGHT: 65 IN | BODY MASS INDEX: 39.65 KG/M2 | DIASTOLIC BLOOD PRESSURE: 68 MMHG

## 2019-04-16 DIAGNOSIS — N63.20 UNSPECIFIED LUMP IN THE LEFT BREAST, UNSPECIFIED QUADRANT: ICD-10-CM

## 2019-04-16 PROCEDURE — 99024 POSTOP FOLLOW-UP VISIT: CPT

## 2019-04-22 ENCOUNTER — OUTPATIENT (OUTPATIENT)
Dept: OUTPATIENT SERVICES | Facility: HOSPITAL | Age: 59
LOS: 1 days | Discharge: HOME | End: 2019-04-22

## 2019-04-22 DIAGNOSIS — C50.919 MALIGNANT NEOPLASM OF UNSPECIFIED SITE OF UNSPECIFIED FEMALE BREAST: ICD-10-CM

## 2019-04-22 DIAGNOSIS — Z98.890 OTHER SPECIFIED POSTPROCEDURAL STATES: Chronic | ICD-10-CM

## 2019-04-22 DIAGNOSIS — K82.9 DISEASE OF GALLBLADDER, UNSPECIFIED: Chronic | ICD-10-CM

## 2019-04-23 ENCOUNTER — APPOINTMENT (OUTPATIENT)
Age: 59
End: 2019-04-23
Payer: COMMERCIAL

## 2019-04-23 VITALS
OXYGEN SATURATION: 94 % | HEART RATE: 115 BPM | RESPIRATION RATE: 14 BRPM | WEIGHT: 235.03 LBS | BODY MASS INDEX: 39.16 KG/M2 | TEMPERATURE: 98.3 F | SYSTOLIC BLOOD PRESSURE: 130 MMHG | HEIGHT: 65 IN | DIASTOLIC BLOOD PRESSURE: 92 MMHG

## 2019-04-23 DIAGNOSIS — N63.0 UNSPECIFIED LUMP IN UNSPECIFIED BREAST: ICD-10-CM

## 2019-04-23 DIAGNOSIS — N64.4 MASTODYNIA: ICD-10-CM

## 2019-04-23 PROCEDURE — 99024 POSTOP FOLLOW-UP VISIT: CPT

## 2019-04-23 NOTE — HISTORY OF PRESENT ILLNESS
[FreeTextEntry1] : 59 y/o woman with personal history of left breast cancer s/p bilateral mastectomy with SUNNI flap reconstruction on 3/27/19.\par \par Her last drain is slowing down to about 40 to 50 cc per day. She c/o burning pain to the lateral lower edge of her right breast, pain started when she reached overhead to grab something from the cabinet. The are is non-tender to touch, not erythematous or edematous. \par

## 2019-04-23 NOTE — PHYSICAL EXAM
[de-identified] : Incisions healing well. No evidence of infection.\par Umbilicus viable.\par  [de-identified] : Bilateral incisions healing well. No evidence of infection.\par Breasts are soft.\par Drain removed today and area cleaned and dressed\par

## 2019-04-23 NOTE — ASSESSMENT
[FreeTextEntry1] : 57 y/o woman with personal history of left breast cancer s/p bilateral mastectomy with SUNNI flap reconstruction on 3/27/19. She is doing well.  Her last drain was removed today.

## 2019-04-24 NOTE — HISTORY OF PRESENT ILLNESS
[FreeTextEntry1] : I had the pleasure of seeing KRISTIE GARZON  in the office today for postoperative visit.\par \par She states she felt this left breast mass 8 months ago and does not undergo annual screening mammogram.  She stated in the last year she has lost both her parents and her  is an alcoholic and neglected the left breast mass.  She also stated she left breast nipple discharge 2 years ago and it was clear.  She recently saw her PCP who sent her for imaging demonstrated a 3.5 cm spiculated mass recommending biopsy and is highly suspicious for malignancy.  Biopsy results demonstrated poorly differentiated invasive ductal carcinoma of left breast ER >90% OR >90% Her2 negative.  She underwent neoadjuvant chemotherapy and completed chemotherapy on 2/5/2019.\par \par She underwent bilateral mastectomy with left SLNB and SUNNI flap 3/27/2019.\par \par G0.  Menses began at age 11.\par She denies personal history of malignancy.\par Family history is significant for maternal grandmother diagnosed with breast cancer at age 55, 2 maternal aunts diagnosed with breast cancer and maternal cousin diagnosed with breast cancer and father diagnosed with prostate cancer at age 74.\par \par Imaging:  [facility] \par 8/27/2018  MAMMO DIAG  KADEN BILAT \par Impression:  New 3 cm irregular spiculated mass in the left retroareolar region with overlying skin retraction and nipple inversion with corresponding solid shadowing mass on sonography with morphologic features highly suspicious for malignancy.  BIRADS 5 highly suggestive for malignancy.  Recommendation Biopsy.  \par 8/27/2018  Breast ultrasound left complete\par Impression:  3.5 cm irregular hypoechoic solid shadowing mass corresponding with the spiculated mass demonstrated mammographically in the left retroareolar region in the area of palpable concern with morphologic features highly suspicious for malignancy.  BIRADS 5 highly suggestive malignancy\par \par 9/14/2018  MR breast wawic bi w cad \par Impression:  Multicentric biopsy proven malignancy in the left breast.  The abnormal non mass enhancement associated with the biopsy proven malignancy in the left 3:00 axis measures significantly larger in AP dimension than depicted on the ultrasound, and extends towards the site of malignancy in the retroareolar region.  No MRI evidence of malignancy in the right breast.  No suspicious lymphadenopathy.  BIRADS 6 Known biopsy proven malignancy.\par \par 3/27/2019  Pathology:  \par (Adendum diagnosis:  The axillary contents were grossly re examined on 4/4/2019.  No definitive tissue was seen grossly.  Six additional cassettes were submitted.  A single negative microscopic lymph nodes are positive for metastatic carcinoma.\par 1.  Breast, right, simple mastectomy:  Negative for malignancy.  Atypical lobular hyperplasia.  Columnar cell change.  Cyst formation.  Microcalcifications.\par 2.  Lymph node, right internal mammary, excision:  One beign lymph node.\par 3.  Breast left simple mastectomy:  Residual infiltrating ductal carcinoma with micropapillary features at both prior biopsy sites.  Steven score for both sites: 3 for tubule formation, 3 for nuclear pleomorphism and 1 for mitotic activity (7/9).  the residual carcinoma located in the retroareolar region measures 1.8 cm.  The residual carcinoma located in the lower outer quadrant measures 1.1 cm.  An incidental minute focus of infiltrating lobular carcinoma, measuring 1.2 mm, is identified in the lower outer quadrant.  Ductal carcinoma in situ, high grade with apocrine features and ductal carcinoma in situ, solid and micropapillary type, intermedia grade with necrosis.  Foci suspicious for lymphovascular space invasion.  PErineural invasion is present.  Previous biopsy site x2/  Microcalcifications in benign tissue.\par 4.  [place] lymph node, left axilla, excision:  Benign adipose tissue.  No lymph node identified. \par 5.  Axillary contents, left, excision:  2/6 lymph nodes are positive for metastatic carcinoma.  The largest lymph node metastasis measures 0.4 cm.  Extranodal extension is identified.  Lymphovascular space invasion is identified.\par 6.  Internal mammary lymph node, left excision:  One lymph node, negative for metastatic carcinoma.\par \par We reviewed and discussed clinical exam and final pathology with both Kristie and her .  She is healing well.  She understands her final surgical pathology demonstrated IDC, ILC, and DCIS.  2/6 lymph nodes were positive for metastatic carcinoma with extranodal extension. Lymphovascular space invasion was identified.  She will follow up with Dr. Narvaez due to final surgical pathology for any further chemotherapy recommendation and hormonal therapy.  She is also to undergo CT chest in June 2019.  She understands and agrees to plan.  All questions answered.

## 2019-04-24 NOTE — ASSESSMENT
[FreeTextEntry1] : 59 yo postmenopausal female  s/p bilateral mastectomy with left SLNB and SUNNI 3/27/2019.  Final surgical pathology demonstrated IDC, ILC, and DCIS.  2/6 lymph nodes positive for macrometastasis.  Extradiol extension and lymphovascular space invasion was identified on final surgical pathology.  Recommendation for followup with Dr. Narvaez for hormonal therapy and possible additional chemotherapy.  She will also be due for followup CT chest in June 2019.\par 1.  Follow up in 8 weeks\par 2.  Follow up with Dr. Narvaez\par 3.  CT chest follow up due 6/2019

## 2019-04-24 NOTE — PHYSICAL EXAM
[Normocephalic] : normocephalic [Atraumatic] : atraumatic [PERRL] : pupils equal, round and reactive to light [EOMI] : extra ocular movement intact [Sclera nonicteric] : sclera nonicteric [Supple] : supple [No Supraclavicular Adenopathy] : no supraclavicular adenopathy [Examined in the supine and seated position] : examined in the supine and seated position [No dominant masses] : no dominant masses in right breast  [No dominant masses] : no dominant masses left breast [No Nipple Retraction] : no left nipple retraction [No Nipple Discharge] : no left nipple discharge [No Axillary Lymphadenopathy] : no left axillary lymphadenopathy [No Edema] : no edema [No Rashes] : no rashes [No Ulceration] : no ulceration [de-identified] : No supraclavicular or axillary adenopathy. 3cm mass behind left nipple. Everted nipple without discharge. No skin changes.\par  [de-identified] : No supraclavicular or axillary adenopathy. No dominant masses, normal to palpation. Everted nipple without discharge. No skin changes.\par

## 2019-04-24 NOTE — PAST MEDICAL HISTORY
[Menarche Age ____] : age at menarche was [unfilled] [Postmenopausal] : The patient is postmenopausal [Menopause Age____] : age at menopause was [unfilled] [Total Preg ___] : G[unfilled]

## 2019-04-25 ENCOUNTER — APPOINTMENT (OUTPATIENT)
Dept: HEMATOLOGY ONCOLOGY | Facility: CLINIC | Age: 59
End: 2019-04-25
Payer: COMMERCIAL

## 2019-04-25 ENCOUNTER — RESULT REVIEW (OUTPATIENT)
Age: 59
End: 2019-04-25

## 2019-04-25 VITALS
DIASTOLIC BLOOD PRESSURE: 86 MMHG | HEART RATE: 87 BPM | BODY MASS INDEX: 39.16 KG/M2 | TEMPERATURE: 98.8 F | WEIGHT: 235.04 LBS | OXYGEN SATURATION: 98 % | SYSTOLIC BLOOD PRESSURE: 141 MMHG | HEIGHT: 65 IN

## 2019-04-25 LAB
BASOPHILS # BLD AUTO: 0 K/UL — SIGNIFICANT CHANGE UP (ref 0–0.2)
BASOPHILS NFR BLD AUTO: 0.9 % — SIGNIFICANT CHANGE UP (ref 0–2)
EOSINOPHIL # BLD AUTO: 0.2 K/UL — SIGNIFICANT CHANGE UP (ref 0–0.5)
EOSINOPHIL NFR BLD AUTO: 4.2 % — SIGNIFICANT CHANGE UP (ref 0–6)
HCT VFR BLD CALC: 40.2 % — SIGNIFICANT CHANGE UP (ref 34.5–45)
HGB BLD-MCNC: 12.8 G/DL — SIGNIFICANT CHANGE UP (ref 11.5–15.5)
LYMPHOCYTES # BLD AUTO: 1.1 K/UL — SIGNIFICANT CHANGE UP (ref 1–3.3)
LYMPHOCYTES # BLD AUTO: 21.4 % — SIGNIFICANT CHANGE UP (ref 13–44)
MCHC RBC-ENTMCNC: 29.7 PG — SIGNIFICANT CHANGE UP (ref 27–34)
MCHC RBC-ENTMCNC: 31.8 G/DL — LOW (ref 32–36)
MCV RBC AUTO: 93.4 FL — SIGNIFICANT CHANGE UP (ref 80–100)
MONOCYTES # BLD AUTO: 0.4 K/UL — SIGNIFICANT CHANGE UP (ref 0–0.9)
MONOCYTES NFR BLD AUTO: 7.6 % — SIGNIFICANT CHANGE UP (ref 2–14)
NEUTROPHILS # BLD AUTO: 3.4 K/UL — SIGNIFICANT CHANGE UP (ref 1.8–7.4)
NEUTROPHILS NFR BLD AUTO: 65.8 % — SIGNIFICANT CHANGE UP (ref 43–77)
PLATELET # BLD AUTO: 204 K/UL — SIGNIFICANT CHANGE UP (ref 150–400)
RBC # BLD: 4.3 M/UL — SIGNIFICANT CHANGE UP (ref 3.8–5.2)
RBC # FLD: 12.6 % — SIGNIFICANT CHANGE UP (ref 10.3–14.5)
WBC # BLD: 5.2 K/UL — SIGNIFICANT CHANGE UP (ref 3.8–10.5)
WBC # FLD AUTO: 5.2 K/UL — SIGNIFICANT CHANGE UP (ref 3.8–10.5)

## 2019-04-25 PROCEDURE — 99214 OFFICE O/P EST MOD 30 MIN: CPT

## 2019-04-26 NOTE — PHYSICAL EXAM
[Restricted in physically strenuous activity but ambulatory and able to carry out work of a light or sedentary nature] : Status 1- Restricted in physically strenuous activity but ambulatory and able to carry out work of a light or sedentary nature, e.g., light house work, office work [Obese] : obese [Normal] : affect appropriate [de-identified] : S1/S2 [de-identified] : negative cervical, supra/infraclavicular adenopathy. [de-identified] : clear to auscultation and percussion. [de-identified] : BS+, soft, nontender on palpation. [de-identified] : bilateral reconstructed breasts with well healed incisions. [de-identified] : no edema [de-identified] : without spinal or cva tenderness.

## 2019-04-26 NOTE — ASSESSMENT
Date: 1/24/2018    Patient Name: Fer Garcia          To Whom it may concern: The above patient was seen at the [facility] for treatment of a medical condition.     This patient should be excused from attending school January 24, 2018 [FreeTextEntry1] : 59 y/o postmenopausal female with locally advanced poorly differentiated left breast IDC,  ER>90%, ME>90%, HER2 negative.  Breast MRI with multicentric disease in left breast, largest abnormal enhancement measures 6.7 cm and extends towards 2.4 cm retroareolar mass.  Completed NACT with DD AC followed by Taxol on 2/8/19.\par \par She is now s/p bilateral mastectomy with SUNNI reconstruction on 3/27/19. We reviewed her pathology in detail. \par She has residual ypT1c ypN1 disease (2/7 LN positive with extracapsular extension + LVI).  There were no clinically abnormal nodes on presurgery MRIs or exam. We discussed consideration for PMRT given residual kevin disease post NACT.  We also discussed role of aduvant endocrine therapy with both SERMs and AIs.  Side effects of each were reviewed.  She's agreeable to start Letrozole. \par \par \par Plan:\par -consultation with Dr. Carl for consideration of PMRT\par -begin Letrozole\par - Neuropathy: Gabapentin: increase to 400 mg AM and 800 mg PM\par - Tentative 2 month follow up

## 2019-04-26 NOTE — ADDENDUM
[FreeTextEntry1] : I, Abena Humphreys, acted solely as a scribe for Dr. Olimpia Narvaez on this date 4/25/19.

## 2019-04-26 NOTE — HISTORY OF PRESENT ILLNESS
[Disease: _____________________] : Disease: [unfilled] [T: ___] : T[unfilled] [N: ___] : N[unfilled] [AJCC Stage: ____] : AJCC Stage: [unfilled] [de-identified] : Ms. KRISTIE GARZON, is a postmenopausal female who was diagnosed with poorly differentiated invasive ductal carcinoma of left breast cancer, ER >90% WV>90%, HER2 negative.   \par \par At initial presentation, she noted L nipple inversion early August 2018 and was sent for a diagnostic mammo and sonogram by her PCP. In addition, she also had noted a left breast mass ~ 8 months ago, and began to notice more pain at the site. She has not gone for screening mammo in a few years. In the last year, she has lost both her parents, and w/ other family issues, neglected the left breast mass. \par \par Mammogram on 8/27/18 demonstrated a 3.0 cm spiculated mass in the left breast retroareolar region overlying skin retraction and nipple inversion, highly suspicious for malignancy.  US breast showed left breast retroareolar 2.5 x 2 x 3.5 cm irregular hypoechoic solid shadowing mass with ill defined borders corresponding with spiculated mass demonstrated mammographically with morphologic features highly suspicious for malignancy as well as 1.3 x 0.5 x 1.0 cm cystic cluster at 3:00 7cmfn. \par \par She saw Dr. Riggs on 8/28/18 and US guided core biopsy was performed on 8/30/18.  \par \par 8/30/18 Pathology\par 1. Left breast, retroareolar, ultrasound guided core biopsy - poorly differentiated IDC with micropapillary features. ER 90%, WV 95%, HER2 negative.\par - Steven score 8/9 (3 + 3 + 2) in this limited material.\par  - Invasive tumor measures at least 1.3 cm\par   2. Left breast, 3:00, 6 cm fn, - poorly differentiated IDC  with  micropapillary features. ER>90% WV>90%, HER2 negative\par - [place] Varina score 8/9 \par \par 9/14/18 MRI breast - LEFT BREAST: *  An irregularly shaped enhancing mass in the left retroareolar region   is compatible with a site of biopsy-proven malignancy. It measures  approximately 2.4 x 2.1 x 2.2 centimeters (AP by TV by CC). There is  involvement of the nipple, with retraction. Susceptibility artifact from  the biopsy marker is noted in the center of the mass. *  In the 3:00 axis, middle third, there is an irregularly shaped  enhancing mass with surrounding non mass enhancement. This is compatible  with the additional site of biopsy-proven malignancy. Overall, the  abnormal enhancement measures 6.7 x 2.8 x 2.1 centimeters (AP x TV x CC).  The AP extent of the associated non mass enhancement measures  significantly larger than depicted on ultrasound, and extends towards the  site of malignancy in the retroareolar region.\par \par \par Family hx: maternal GM breast cancer (early 50's), maternal 1st cousin (late 50's), 1 maternal aunts ovarian, another maternal aunt w/ 2 unknown cancers. Father w/ prostate cancer. 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 No illicit drug use.\par \par PMH: Hashimoto's (53 y/o), was on thyroid medication in the past, no longer, HTN, arthritis, pre-diabetic, reflux (takes an occasional Zantac, and ventral hernia. Claustrophobia w/ MRI, situational anxiety/depression.\par \par Past sx hx: polyp removal (was told benign), cholecystomy, L knee sx. \par \par Health Care Providers:\par PCP: Dr. Rashard Posada ([place])\par \par Treatment summary:\par 9/28/18 - 11/9/18  dose dense AC x 4 cycles\par 11/23/18 - 2/8/19 weekly Taxol 80 mg/m2 x 12 weeks [de-identified] : poorly differentiated IDC [de-identified] : post neoadjuvant DDAC - ypT1c ypN1a = IIA [de-identified] : ER>90% SD>90%, HER2 negative [de-identified] : Patient returns for follow up. \par s/p bilateral mastectomy with SUNNI reconstruction on 3/27/19. She is healing well post-op. She reports a burning and pulling sensation under her right breast after she stretched too far, worse with movement.\par Constant numbness in her fingertips and toes. Intermittent pain. She has difficulty buttoning her shirts. Currently taking gabapentin 400 mg BID \par Mother and grandmother both  of strokes. Patient has no history of blood clots. \par Maternal aunt had uterine cancer. No known family history of colon cancer.\par Reports having a bone scan 3-4 years ago. \par \par 3/27/19 Pathology:\par 1.  Breast, right, simple mastectomy:\par - Negative for malignancy\par \par 2.  Lymph node, right internal mammary, excision:\par - One benign lymph node\par \par 3.  Breast, left, simple mastectomy:\par - Residual infiltrating ductal carcinoma with micropapillary features at both prior biopsy sites\par -  Hatton score for both sites:  3 for tubule formation, 3 for nuclear pleomorphism and 1 for mitotic activity (7/9)\par - The residual carcinoma located in the retroareolar region measures 1.8 cm\par - The residual carcinoma located in the lower outer quadrant measures 1.1 cm\par - An incidental minute focus of infiltrating lobular carcinoma, measuring 1.2 mm, is identified in the lower outer quadrant\par - Ductal carcinoma in situ, high grade, with apocrine features and ductal carcinoma in situ, solid and micropapillary type, intermediate grade with necrosis\par - Foci suspicious for lymphovascular space invasion\par - Perineural invasion is present\par - Previous biopsy site x2\par - Microcalcification in benign tissue\par \par 4.  [place] lymph node, left axilla, excision:\par - Benign adipose tissue\par - No lymph node identified\par \par 5.  Axillary contents, left, excision:\par - 2/6 lymph nodes are positive for metastatic carcinoma\par - The largest lymph node metastasis measures 0.4 cm\par - Extranodal extension is identified\par - Lymphovascular space invasion is identified\par \par 6.  Internal mammary lymph node, left, excision:\par - One lymph node, negative for metastatic carcinoma\par \par Addendum Diagnosis\par The axillary contents were grossly re-examined on 19.  No definitive lymphoid tissue was seen grossly.  Six additional cassettes were submitted.\par A single negative microscopic lymph node was seen in the additional tissue submitted. Therefore, 2/7 lymph nodes are positive for metastatic carcinoma.\par \par ypT1c ypN1a\par \par 3/7/19 CT Chest: \par - 2 subcentimeter bilateral pulmonary nodules. A 3 month follow-up chest CT can be performed to ensure stability.\par - 6 mm lucency within the upper sternum corresponding to the abnormality seen on the prior breast MRI on 19 without aggressive features. This can be monitored on the follow-up CT\par - 2.6 x 4 cm intramuscular paraspinal lipoma cannot be characterized as a simple lipoma given a few thin internal septations. Surgical consultation is recommended. \par \par 3/7/19 Whole Body Bone scan: \par 1. No radionuclide evidence of osseous metastasis.\par 2. Degenerative changes in the major joints\par 3. Nonspecific sternal focus noted on MRI is not visualized

## 2019-04-26 NOTE — RESULTS/DATA
[FreeTextEntry1] : 2/19/18 breast MRI\par RIGHT BREAST:\par A chemotherapy infusion catheter is visualized within the upper inner right breast. There are scattered enhancing nonspecific foci.  There is no suspicious enhancement in the right breast.  \par \par LEFT BREAST:\par In the left breast 3:00 axis middle depth there is nonmass enhancement measuring approximately 4.8 x 1.8 cm (series 6, image 555), previously measuring 6.7 x 2.8 cm. Biopsy clip susceptibility artifact is seen at the anterior aspect of this enhancement which demonstrates predominantly progressive enhancement and corresponds to a site of biopsy proven malignancy.\par \par In the 3:00 left retroareolar location biopsy clip is again visualized in association with a 2.4 x 1.3 cm irregular enhancing mass (series 6, image \par 561), corresponding to the site of biopsy-proven malignancy. This mass is inseparable from the nipple with apparent left nipple retraction. This mass demonstrates predominantly progressive enhancement kinetics and previously measured 2.4 x 2.1 cm.\par \par In the lower central/slightly inner left breast there is an irregular enhancing mass measuring 1.0 x 0.8 x 0.8 cm (series 6, image 228). This demonstrates progressive enhancement kinetics.\par \par There are scattered enhancing nonspecific foci. \par \par AXILLA/OTHER:There is no significant axillary or internal mammary lymphadenopathy.  There is an unchanged nonspecific 4 mm T2 bright sternal lesion (series 3, image 26).\par \par IMPRESSION:\par 1.) There has been some interval decrease in size of biopsy-proven masses in the left breast 3:00 axis middle depth and the left 3:00 retroareolar location as described above, consistent with partial response to treatment. The left retroareolar mass is inseparable from the left nipple which is retracted.\par 2.) Additional irregular enhancing mass measuring 1.0 cm in the left lower central/slightly inner breast. If this will affect patient management, MRI guided biopsy is recommended. \par 3.) No suspicious enhancing findings in the right breast and no significant internal mammary or axillary lymphadenopathy.\par 4.) Unchanged nonspecific 4 mm T2 bright sternal lesion. This can be further evaluated with bone scan and sternal CT scan.

## 2019-04-30 ENCOUNTER — OUTPATIENT (OUTPATIENT)
Dept: OUTPATIENT SERVICES | Facility: HOSPITAL | Age: 59
LOS: 1 days | Discharge: HOME | End: 2019-04-30
Payer: COMMERCIAL

## 2019-04-30 DIAGNOSIS — K82.9 DISEASE OF GALLBLADDER, UNSPECIFIED: Chronic | ICD-10-CM

## 2019-04-30 DIAGNOSIS — Z98.890 OTHER SPECIFIED POSTPROCEDURAL STATES: Chronic | ICD-10-CM

## 2019-05-03 ENCOUNTER — APPOINTMENT (OUTPATIENT)
Age: 59
End: 2019-05-03

## 2019-05-03 ENCOUNTER — APPOINTMENT (OUTPATIENT)
Dept: RADIATION ONCOLOGY | Facility: CLINIC | Age: 59
End: 2019-05-03
Payer: COMMERCIAL

## 2019-05-03 VITALS
HEIGHT: 65 IN | SYSTOLIC BLOOD PRESSURE: 134 MMHG | DIASTOLIC BLOOD PRESSURE: 83 MMHG | BODY MASS INDEX: 39.15 KG/M2 | OXYGEN SATURATION: 96 % | RESPIRATION RATE: 16 BRPM | TEMPERATURE: 98.6 F | WEIGHT: 235 LBS | HEART RATE: 111 BPM

## 2019-05-03 PROCEDURE — 77263 THER RADIOLOGY TX PLNG CPLX: CPT

## 2019-05-03 PROCEDURE — 99205 OFFICE O/P NEW HI 60 MIN: CPT | Mod: 25,GC

## 2019-05-03 RX ORDER — PROCHLORPERAZINE MALEATE 10 MG/1
10 TABLET ORAL EVERY 6 HOURS
Qty: 120 | Refills: 1 | Status: COMPLETED | COMMUNITY
Start: 2018-09-28 | End: 2019-05-03

## 2019-05-03 RX ORDER — POTASSIUM CHLORIDE 1500 MG/1
20 TABLET, EXTENDED RELEASE ORAL
Qty: 3 | Refills: 0 | Status: COMPLETED | COMMUNITY
Start: 2019-01-28 | End: 2019-05-03

## 2019-05-03 RX ORDER — DEXAMETHASONE 2 MG/1
2 TABLET ORAL
Qty: 10 | Refills: 0 | Status: COMPLETED | COMMUNITY
Start: 2018-11-09 | End: 2019-05-03

## 2019-05-03 RX ORDER — ONDANSETRON 8 MG/1
8 TABLET ORAL
Qty: 90 | Refills: 1 | Status: COMPLETED | COMMUNITY
Start: 2018-09-28 | End: 2019-05-03

## 2019-05-03 NOTE — VITALS
[Least Pain Intensity: 0/10] : 0/10 [Maximal Pain Intensity: 0/10] : 0/10 [Date: ____________] : Patient's last distress assessment performed on [unfilled]. [Patient given social work contact information and resource sheet] : Patient was given social work contact information and resource sheet [5 - Distress Level] : Distress Level: 5 [80: Normal activity with effort; some signs or symptoms of disease.] : 80: Normal activity with effort; some signs or symptoms of disease.  [ECOG Performance Status: 1 - Restricted in physically strenuous activity but ambulatory and able to carry out work of a light or sedentary nature] : Performance Status: 1 - Restricted in physically strenuous activity but ambulatory and able to carry out work of a light or sedentary nature, e.g., light house work, office work [FreeTextEntry7] : Could meet with [facility] if needed

## 2019-05-03 NOTE — HISTORY OF PRESENT ILLNESS
[FreeTextEntry1] : Ms. Shook is a 58 year old woman with [id number] left breast invasive ductal carcinoma, s/p neoadjuvant ACT, followed by bilateral mastectomies with SUNNI flap reconstruction, [id number], IIB, ER/NE positive, HER2 negative. \par \par She has a history of clear nipple discharge 2 years ago and palpated a left breast mass 01/2018. She had palpated a similar left breast mass several years ago and had a negative biopsy at time therefore did not seek further medical care at that time. 8/27/2018 bilateral mammogram/ultrasound showed 3.5 cm spiculated mass in the left retroareolar region with overlying skin retraction and nipple inversion. Biopsy on 8/30/18 showed poorly differentiated invasive ductal carcinoma of left breast ER >90% NE >90% Her2 negative. 9/14/2018 bilateral breast MRI showed 2.4 cm left retroareolar mass, 6.7 cm mass in 3:00 axis, no adenopathy or right breast masses.  She completed neoadjuvant AC/Taxol 9/28/18-2/8/2019 under Dr. Narvaez. 2/19/19 bilateral breast MRI showed the left breast 3:00 mass decreased from 6.7 cm to 4.8 cm. there was also a 2.4 cm left retroareolar mass and a 1 cm left inner breast mass. No adenopathy or right breast masses. Unchanged nonspecific 4 mm T2 bright sternal lesion. 3/7/19 CT Chest showed 2 subcentimeter bilateral pulmonary nodules, 6 mm lucency within the upper sternum, and a 4 cm intramuscular paraspinal lipoma. 3/7/19 bone scan was negative. \par \par 3/27/2019 bilateral mastectomy with left axillary node dissection and SUNNI flap reconstruction with Dr. Hopson and Dr. Riggs. \par Right breast simple mastectomy was negative, One negative right internal mammary node. Left breast simple mastectomy showed residual infiltrating ductal carcinoma with micropapillary features at both prior biopsy sites. Moderately differentiated, retro areolar region 1.8 cm, lower outer quadrant 1.1 cm. 1.2 mm focus of invasive lobular carcinoma in the lower outer quadrant. There was also high grade DCIS, solid and micro papillary type. There was LVSI and PNI. Left axillary lymph node excision had 4 mm node, 2/6 nodes positive, with extranodal extension. One internal mammary lymph node was negative. The axillary contents were reexamined on 4/4/19, a single negative node was seen. Therefore, 2/7 nodes were positive. She followed with Dr. Narvaez on 4/25/19 and she began letrozole. For her peripheral neuropathy, she takes gabapentin. She has a family history of breast, prostate and ovarian cancer. \par \par Today, she feels generally well with stable neuropathy. Her range of motion is improving but continues to have mild fatigue and chest wall numbness. She denies significant pain, breast edema, itch, arm edema.

## 2019-05-03 NOTE — DISEASE MANAGEMENT
[Pathological] : TNM Stage: p [IIA] : IIA [TTNM] : 1c [FreeTextEntry4] : left breast IDC, grade 3, ER+/MS+, HER2-; yp [NTNM] : N1a [MTNM] : 0

## 2019-05-03 NOTE — OB/GYN HISTORY
[___] : Total Pregnancies: [unfilled] [Definite:  ___ (Date)] : the last menstrual period was [unfilled] [Menopause Age: ____] : patient was [unfilled] years old at menopause [History of Birth Control Pills] : Patient has no history of taking birth control pills [History of Hormone Replacement Therapy] : no history of hormone replacement therapy

## 2019-05-03 NOTE — PHYSICAL EXAM
[Breast Palpation Mass] : no palpable masses [No UE Edema] : there is no upper extremity edema [Normal] : no focal deficits [Cervical Lymph Nodes Enlarged Posterior Bilaterally] : posterior cervical [Cervical Lymph Nodes Enlarged Anterior Bilaterally] : anterior cervical [Supraclavicular Lymph Nodes Enlarged Bilaterally] : supraclavicular [Axillary Lymph Nodes Enlarged Bilaterally] : axillary [Musculoskeletal - Swelling] : no joint swelling [Motor Tone] : muscle strength and tone were normal [No Spine Tenderness] : no tenderness to palpation of the vertebral spine [de-identified] : s/p bilateral mastectomies with reconstruction, healed surgical scar with residual erythema, numbness to palpation [de-identified] : healed surgical scars with mild erythema [de-identified] : limited left arm range of motion

## 2019-05-03 NOTE — REVIEW OF SYSTEMS
[Skin Wound] : skin wound [Negative] : Heme/Lymph [Edema Limbs: Grade 0] : Edema Limbs: Grade 0  [Fatigue: Grade 0] : Fatigue: Grade 0 [Neck Edema: Grade 0] : Neck Edema: Grade 0 [Breast Pain: Grade 0] : Breast Pain: Grade 0 [Localized Edema: Grade 0] : Localized Edema: Grade 0  [de-identified] : scabs on scar

## 2019-05-03 NOTE — LETTER CLOSING
[Consult Closing:] : Thank you for allowing me to participate in the care of this patient.  If you have any questions, please do not hesitate to contact me. [Sincerely yours,] : Sincerely yours, [FreeTextEntry3] : Eben Carl MD\par Attending Physician\par Department of Radiation Medicine\par [facility]\par \par \par Renetta Young \par [employer] [facility]\par

## 2019-05-03 NOTE — SOCIAL HISTORY
[Former  Cigarette ___ Pack(s) per day] : former cigarette pack(s) per day:  [unfilled]  [Former  Cigarette ___ Pack Year(s)] : former pack year(s) of cigarette use: [unfilled]

## 2019-05-06 PROBLEM — Z85.3 PERSONAL HISTORY OF BREAST CANCER: Status: ACTIVE | Noted: 2019-05-06

## 2019-05-06 NOTE — ASSESSMENT
[FreeTextEntry1] : 59 y/o woman with personal history of left breast cancer s/p bilateral mastectomy with SUNNI flap reconstruction on 3/27/19. She is doing well.  Return to office in one week for removal of last drain.

## 2019-05-06 NOTE — PHYSICAL EXAM
[de-identified] : Bilateral incisions healing well. No evidence of infection.\par Breasts are soft.\par Drains in place.\par  [de-identified] : Incisions healing well. No evidence of infection.\par Umbilicus viable.\par

## 2019-05-06 NOTE — ADDENDUM
[FreeTextEntry1] : I, Marietta Wallis, acted solely as a scribe for Dr. Jaxon Hopson on this date 4/2/19.

## 2019-05-06 NOTE — HISTORY OF PRESENT ILLNESS
[FreeTextEntry1] : 57 y/o woman with personal history of left breast cancer s/p bilateral mastectomy with SUNNI flap reconstruction on 3/27/19.\par \par She has started taking half of her usual dosage of Metoprolol. \par Her drain log reveals over 30cc of output per day.

## 2019-05-06 NOTE — HISTORY OF PRESENT ILLNESS
[FreeTextEntry1] : 59 y/o woman with personal history of left breast cancer s/p bilateral mastectomy with SUNNI flap reconstruction on 3/27/19.\par \par She is doing well and has no complaints today. Her last drain is still putting out moderate amount of serosanguineous fluid.

## 2019-05-06 NOTE — ASSESSMENT
[FreeTextEntry1] : 59 y/o woman with personal history of left breast cancer s/p bilateral mastectomy with SUNNI flap reconstruction on 3/27/19. She is doing well. I have recommended that the patient keep her umbilical wound open unless she feels it is getting irritated. Return in one week for drain removal.

## 2019-05-06 NOTE — PHYSICAL EXAM
[de-identified] : Bilateral incisions healing well. No evidence of infection.\par Breasts are soft.\par Drains in place.\par  [de-identified] : Incisions healing well. No evidence of infection.\par Umbilicus viable.\par

## 2019-05-06 NOTE — PHYSICAL EXAM
[de-identified] : Bilateral incisions healing well. No evidence of infection.\par Resolving ecchymosis left breast.\par Breasts are soft.\par Drains in place.\par HB was present during exam.  [de-identified] : Incisions healing well. No evidence of infection.\par Umbilicus viable.\par

## 2019-05-06 NOTE — HISTORY OF PRESENT ILLNESS
[FreeTextEntry1] : 59 y/o woman with personal history of left breast cancer s/p bilateral mastectomy with SUNNI flap reconstruction on 3/27/19.\par \par She has started taking half of her usual dosage of Metoprolol. \par Her drain log revels about 15cc of output per day in drain numbers 3 and 4\par Her drain log reveals over 30cc of output per day in drain numbers 1 and 2

## 2019-05-14 ENCOUNTER — APPOINTMENT (OUTPATIENT)
Dept: PLASTIC SURGERY | Facility: CLINIC | Age: 59
End: 2019-05-14

## 2019-05-14 VITALS
HEIGHT: 65 IN | SYSTOLIC BLOOD PRESSURE: 151 MMHG | DIASTOLIC BLOOD PRESSURE: 87 MMHG | BODY MASS INDEX: 38.32 KG/M2 | TEMPERATURE: 98.4 F | OXYGEN SATURATION: 93 % | HEART RATE: 109 BPM | WEIGHT: 230 LBS

## 2019-05-14 NOTE — PHYSICAL EXAM
[NI] : Normal [de-identified] : Bilateral incisions healing well. No evidence of infection.\par Breasts are soft.\par Drain removed today and area cleaned and dressed\par  [de-identified] : Incisions healing well. No evidence of infection.\par Umbilicus viable. +dog ear deformity bilaterally on the abdomen incision. \par

## 2019-05-14 NOTE — HISTORY OF PRESENT ILLNESS
[FreeTextEntry1] : 59 y/o woman with personal history of left breast cancer s/p bilateral mastectomy with SUNNI flap reconstruction on 3/27/19.\par \par She is doing well and denies any issues or complaints.

## 2019-05-14 NOTE — ASSESSMENT
[FreeTextEntry1] : 57 y/o woman with personal history of left breast cancer s/p bilateral mastectomy with SUNNI flap reconstruction on 3/27/19. She is doing well and will be starting radiation soon which will be going for 5 weeks. \par \par Return to office in 3 months to discuss revision surgery

## 2019-05-15 PROCEDURE — 77290 THER RAD SIMULAJ FIELD CPLX: CPT | Mod: 26

## 2019-05-15 PROCEDURE — 77334 RADIATION TREATMENT AID(S): CPT | Mod: 26

## 2019-05-16 ENCOUNTER — RX RENEWAL (OUTPATIENT)
Age: 59
End: 2019-05-16

## 2019-06-03 PROCEDURE — 77300 RADIATION THERAPY DOSE PLAN: CPT | Mod: 26

## 2019-06-03 PROCEDURE — 77293 RESPIRATOR MOTION MGMT SIMUL: CPT | Mod: 26

## 2019-06-03 PROCEDURE — 77301 RADIOTHERAPY DOSE PLAN IMRT: CPT | Mod: 26

## 2019-06-03 PROCEDURE — 77338 DESIGN MLC DEVICE FOR IMRT: CPT | Mod: 26

## 2019-06-05 PROCEDURE — 77387C: CUSTOM

## 2019-06-06 VITALS
OXYGEN SATURATION: 93 % | HEART RATE: 117 BPM | RESPIRATION RATE: 16 BRPM | BODY MASS INDEX: 37.82 KG/M2 | WEIGHT: 227 LBS | TEMPERATURE: 97.9 F | HEIGHT: 65 IN | DIASTOLIC BLOOD PRESSURE: 106 MMHG | SYSTOLIC BLOOD PRESSURE: 161 MMHG

## 2019-06-06 PROCEDURE — 77387C: CUSTOM

## 2019-06-06 NOTE — DISEASE MANAGEMENT
[Pathological] : TNM Stage: p [IIB] : IIB [TTNM] : 1c [NTNM] : 1a [FreeTextEntry4] : IDC left breast [de-identified] : 400 [MTNM] : 0 [de-identified] : left chest wall [de-identified] : 8759

## 2019-06-06 NOTE — REVIEW OF SYSTEMS
[Edema Limbs: Grade 0] : Edema Limbs: Grade 0  [Localized Edema: Grade 0] : Localized Edema: Grade 0  [Fatigue: Grade 0] : Fatigue: Grade 0 [Neck Edema: Grade 0] : Neck Edema: Grade 0 [Breast Pain: Grade 0] : Breast Pain: Grade 0 [Skin Hyperpigmentation: Grade 0] : Skin Hyperpigmentation: Grade 0 [Pruritus: Grade 0] : Pruritus: Grade 0 [Skin Induration: Grade 0] : Skin Induration: Grade 0 [Skin Atrophy: Grade 0] : Skin Atrophy: Grade 0 [Dermatitis Radiation: Grade 0] : Dermatitis Radiation: Grade 0

## 2019-06-07 PROCEDURE — 77387C: CUSTOM

## 2019-06-10 ENCOUNTER — RX RENEWAL (OUTPATIENT)
Age: 59
End: 2019-06-10

## 2019-06-10 PROCEDURE — 77387C: CUSTOM

## 2019-06-11 ENCOUNTER — APPOINTMENT (OUTPATIENT)
Dept: SURGERY | Facility: CLINIC | Age: 59
End: 2019-06-11

## 2019-06-11 PROCEDURE — 77427 RADIATION TX MANAGEMENT X5: CPT

## 2019-06-11 PROCEDURE — 77387C: CUSTOM

## 2019-06-12 ENCOUNTER — OUTPATIENT (OUTPATIENT)
Dept: OUTPATIENT SERVICES | Facility: HOSPITAL | Age: 59
LOS: 1 days | Discharge: HOME | End: 2019-06-12

## 2019-06-12 DIAGNOSIS — C50.919 MALIGNANT NEOPLASM OF UNSPECIFIED SITE OF UNSPECIFIED FEMALE BREAST: ICD-10-CM

## 2019-06-12 DIAGNOSIS — Z98.890 OTHER SPECIFIED POSTPROCEDURAL STATES: Chronic | ICD-10-CM

## 2019-06-12 DIAGNOSIS — K82.9 DISEASE OF GALLBLADDER, UNSPECIFIED: Chronic | ICD-10-CM

## 2019-06-12 PROCEDURE — 77387C: CUSTOM

## 2019-06-13 VITALS
SYSTOLIC BLOOD PRESSURE: 130 MMHG | BODY MASS INDEX: 37.32 KG/M2 | HEIGHT: 65 IN | HEART RATE: 89 BPM | TEMPERATURE: 99 F | OXYGEN SATURATION: 95 % | RESPIRATION RATE: 16 BRPM | WEIGHT: 224 LBS | DIASTOLIC BLOOD PRESSURE: 60 MMHG

## 2019-06-13 PROCEDURE — 77387C: CUSTOM

## 2019-06-13 NOTE — VITALS
[Least Pain Intensity: 0/10] : 0/10 [90: Able to carry normal activity; minor signs or symptoms of disease.] : 90: Able to carry normal activity; minor signs or symptoms of disease.  [Maximal Pain Intensity: 2/10] : 2/10 [Pain Description/Quality: ___] : Pain description/quality: [unfilled] [Pain Duration: ___] : Pain duration: [unfilled] [Pain Location: ___] : Pain Location: [unfilled] [NoTreatment Scheduled] : no treatment scheduled [Pain Interferes with ADLs] : Pain does not interfere with activities of daily living

## 2019-06-13 NOTE — DISEASE MANAGEMENT
[Pathological] : TNM Stage: p [IIB] : IIB [FreeTextEntry4] : IDC left breast [TTNM] : 1c [NTNM] : 1a [de-identified] : 4027 [MTNM] : 0 [de-identified] : left chest wall [de-identified] : 0928

## 2019-06-13 NOTE — PHYSICAL EXAM
[Normal] : normal heart rate and rhythm, normal S1 and S2, and no murmurs present [de-identified] : asymmetry of reconstruction, with L>R; no warmth, erythema, tenderness.

## 2019-06-13 NOTE — REVIEW OF SYSTEMS
[Edema Limbs: Grade 0] : Edema Limbs: Grade 0  [Fatigue: Grade 0] : Fatigue: Grade 0 [Neck Edema: Grade 0] : Neck Edema: Grade 0 [Pruritus: Grade 0] : Pruritus: Grade 0 [Skin Atrophy: Grade 0] : Skin Atrophy: Grade 0 [Dermatitis Radiation: Grade 0] : Dermatitis Radiation: Grade 0 [Skin Hyperpigmentation: Grade 0] : Skin Hyperpigmentation: Grade 0 [Skin Induration: Grade 0] : Skin Induration: Grade 0 [Localized Edema: Grade 2 - Moderate localized edema and intervention indicated; limiting instrumental ADL] : Localized Edema: Grade 2 - Moderate localized edema and intervention indicated; limiting instrumental ADL [Breast Pain: Grade 1 - Mild pain] : Breast Pain: Grade 1 - Mild pain [FreeTextEntry4] : left reconstruction [FreeTextEntry7] : pulling

## 2019-06-14 ENCOUNTER — APPOINTMENT (OUTPATIENT)
Age: 59
End: 2019-06-14

## 2019-06-14 ENCOUNTER — RESULT REVIEW (OUTPATIENT)
Age: 59
End: 2019-06-14

## 2019-06-14 ENCOUNTER — APPOINTMENT (OUTPATIENT)
Dept: HEMATOLOGY ONCOLOGY | Facility: CLINIC | Age: 59
End: 2019-06-14
Payer: COMMERCIAL

## 2019-06-14 ENCOUNTER — LABORATORY RESULT (OUTPATIENT)
Age: 59
End: 2019-06-14

## 2019-06-14 VITALS
TEMPERATURE: 98.4 F | SYSTOLIC BLOOD PRESSURE: 119 MMHG | BODY MASS INDEX: 37.32 KG/M2 | WEIGHT: 224 LBS | HEART RATE: 86 BPM | DIASTOLIC BLOOD PRESSURE: 86 MMHG | OXYGEN SATURATION: 95 % | HEIGHT: 65 IN

## 2019-06-14 LAB
BASOPHILS # BLD AUTO: 0 K/UL — SIGNIFICANT CHANGE UP (ref 0–0.2)
BASOPHILS NFR BLD AUTO: 1.2 % — SIGNIFICANT CHANGE UP (ref 0–2)
EOSINOPHIL # BLD AUTO: 0.2 K/UL — SIGNIFICANT CHANGE UP (ref 0–0.5)
EOSINOPHIL NFR BLD AUTO: 4.6 % — SIGNIFICANT CHANGE UP (ref 0–6)
HCT VFR BLD CALC: 45.4 % — HIGH (ref 34.5–45)
HGB BLD-MCNC: 14.9 G/DL — SIGNIFICANT CHANGE UP (ref 11.5–15.5)
LYMPHOCYTES # BLD AUTO: 0.5 K/UL — LOW (ref 1–3.3)
LYMPHOCYTES # BLD AUTO: 15 % — SIGNIFICANT CHANGE UP (ref 13–44)
MCHC RBC-ENTMCNC: 29.5 PG — SIGNIFICANT CHANGE UP (ref 27–34)
MCHC RBC-ENTMCNC: 32.8 G/DL — SIGNIFICANT CHANGE UP (ref 32–36)
MCV RBC AUTO: 90 FL — SIGNIFICANT CHANGE UP (ref 80–100)
MONOCYTES # BLD AUTO: 0.2 K/UL — SIGNIFICANT CHANGE UP (ref 0–0.9)
MONOCYTES NFR BLD AUTO: 6 % — SIGNIFICANT CHANGE UP (ref 2–14)
NEUTROPHILS # BLD AUTO: 2.5 K/UL — SIGNIFICANT CHANGE UP (ref 1.8–7.4)
NEUTROPHILS NFR BLD AUTO: 73.3 % — SIGNIFICANT CHANGE UP (ref 43–77)
PLATELET # BLD AUTO: 201 K/UL — SIGNIFICANT CHANGE UP (ref 150–400)
RBC # BLD: 5.04 M/UL — SIGNIFICANT CHANGE UP (ref 3.8–5.2)
RBC # FLD: 12.7 % — SIGNIFICANT CHANGE UP (ref 10.3–14.5)
WBC # BLD: 3.5 K/UL — LOW (ref 3.8–10.5)
WBC # FLD AUTO: 3.5 K/UL — LOW (ref 3.8–10.5)

## 2019-06-14 PROCEDURE — 77387C: CUSTOM

## 2019-06-14 PROCEDURE — 99214 OFFICE O/P EST MOD 30 MIN: CPT

## 2019-06-14 RX ORDER — TELMISARTAN 20 MG/1
20 TABLET ORAL
Qty: 30 | Refills: 0 | Status: DISCONTINUED | COMMUNITY
Start: 2019-01-29 | End: 2019-06-14

## 2019-06-14 RX ORDER — ESCITALOPRAM OXALATE 10 MG/1
10 TABLET, FILM COATED ORAL DAILY
Refills: 0 | Status: DISCONTINUED | COMMUNITY
End: 2019-06-14

## 2019-06-14 RX ORDER — ZOLPIDEM TARTRATE 10 MG/1
10 TABLET ORAL
Qty: 30 | Refills: 3 | Status: DISCONTINUED | COMMUNITY
Start: 2018-12-19 | End: 2019-06-14

## 2019-06-17 PROCEDURE — 77387C: CUSTOM

## 2019-06-18 PROCEDURE — 77387C: CUSTOM

## 2019-06-18 PROCEDURE — 77427 RADIATION TX MANAGEMENT X5: CPT

## 2019-06-19 PROCEDURE — 77387C: CUSTOM

## 2019-06-20 VITALS
HEART RATE: 111 BPM | DIASTOLIC BLOOD PRESSURE: 70 MMHG | OXYGEN SATURATION: 96 % | SYSTOLIC BLOOD PRESSURE: 118 MMHG | RESPIRATION RATE: 16 BRPM | BODY MASS INDEX: 36.61 KG/M2 | WEIGHT: 220 LBS

## 2019-06-20 PROCEDURE — 77387C: CUSTOM

## 2019-06-20 NOTE — VITALS
[Maximal Pain Intensity: 6/10] : 6/10 [Least Pain Intensity: 4/10] : 4/10 [Pain Description/Quality: ___] : Pain description/quality: [unfilled] [Pain Duration: ___] : Pain duration: [unfilled] [OTC] : OTC [80: Normal activity with effort; some signs or symptoms of disease.] : 80: Normal activity with effort; some signs or symptoms of disease.  [ECOG Performance Status: 1 - Restricted in physically strenuous activity but ambulatory and able to carry out work of a light or sedentary nature] : Performance Status: 1 - Restricted in physically strenuous activity but ambulatory and able to carry out work of a light or sedentary nature, e.g., light house work, office work

## 2019-06-20 NOTE — HISTORY OF PRESENT ILLNESS
[FreeTextEntry1] : Pt has an occasional twinge in Left breast which is brief.  No pain medication is needed.  Pt's skin under is severe erythema with some blisters which is itchy as well as painful.  Pt refuses to take any pain medication due to difficulty with the stomach.

## 2019-06-20 NOTE — REVIEW OF SYSTEMS
[Dermatitis Radiation: Grade 2 - Moderate to brisk erythema; patchy moist desquamation, mostly confined to skin folds and creases; moderate edema] : Dermatitis Radiation: Grade 2 - Moderate to brisk erythema; patchy moist desquamation, mostly confined to skin folds and creases; moderate edema

## 2019-06-21 PROCEDURE — 77387C: CUSTOM

## 2019-06-24 VITALS
DIASTOLIC BLOOD PRESSURE: 79 MMHG | BODY MASS INDEX: 36.82 KG/M2 | TEMPERATURE: 98.2 F | RESPIRATION RATE: 16 BRPM | HEART RATE: 118 BPM | WEIGHT: 221 LBS | SYSTOLIC BLOOD PRESSURE: 112 MMHG | HEIGHT: 65 IN

## 2019-06-24 NOTE — VITALS
[Pain Description/Quality: ___] : Pain description/quality: [unfilled] [Pain Location: ___] : Pain Location: [unfilled] [Pain Duration: ___] : Pain duration: [unfilled] [Maximal Pain Intensity: 7/10] : 7/10 [Least Pain Intensity: 6/10] : 6/10 [Pain Interferes with ADLs] : Pain does not interfere with activities of daily living [Adjuvant (neuroleptics)] : adjuvant (neuroleptics) [80: Normal activity with effort; some signs or symptoms of disease.] : 80: Normal activity with effort; some signs or symptoms of disease.

## 2019-06-24 NOTE — REVIEW OF SYSTEMS
[Fatigue: Grade 0] : Fatigue: Grade 0 [Edema Limbs: Grade 0] : Edema Limbs: Grade 0  [Localized Edema: Grade 2 - Moderate localized edema and intervention indicated; limiting instrumental ADL] : Localized Edema: Grade 2 - Moderate localized edema and intervention indicated; limiting instrumental ADL [Neck Edema: Grade 0] : Neck Edema: Grade 0 [Breast Pain: Grade 1 - Mild pain] : Breast Pain: Grade 1 - Mild pain [Pruritus: Grade 0] : Pruritus: Grade 0 [Skin Atrophy: Grade 0] : Skin Atrophy: Grade 0 [Skin Hyperpigmentation: Grade 0] : Skin Hyperpigmentation: Grade 0 [Skin Induration: Grade 0] : Skin Induration: Grade 0 [Dermatitis Radiation: Grade 0] : Dermatitis Radiation: Grade 0 [FreeTextEntry4] : left reconstruction [FreeTextEntry7] : pulling

## 2019-06-24 NOTE — DISEASE MANAGEMENT
[Pathological] : TNM Stage: p [IIB] : IIB [FreeTextEntry4] : IDC left breast [TTNM] : 1c [NTNM] : 1a [MTNM] : 0 [de-identified] : 5995 [de-identified] : 3354 [de-identified] : left chest wall

## 2019-06-24 NOTE — PHYSICAL EXAM
[Normal] : oriented to person, place and time, the affect was normal, the mood was normal and not anxious [de-identified] : asymmetry of reconstruction, with L>R; no warmth, erythema, tenderness. negative...

## 2019-06-25 ENCOUNTER — OUTPATIENT (OUTPATIENT)
Dept: OUTPATIENT SERVICES | Facility: HOSPITAL | Age: 59
LOS: 1 days | End: 2019-06-25

## 2019-06-25 ENCOUNTER — APPOINTMENT (OUTPATIENT)
Dept: DERMATOLOGY | Facility: CLINIC | Age: 59
End: 2019-06-25
Payer: COMMERCIAL

## 2019-06-25 ENCOUNTER — APPOINTMENT (OUTPATIENT)
Dept: CT IMAGING | Facility: CLINIC | Age: 59
End: 2019-06-25

## 2019-06-25 DIAGNOSIS — Z98.890 OTHER SPECIFIED POSTPROCEDURAL STATES: Chronic | ICD-10-CM

## 2019-06-25 DIAGNOSIS — R93.89 ABNORMAL FINDINGS ON DIAGNOSTIC IMAGING OF OTHER SPECIFIED BODY STRUCTURES: ICD-10-CM

## 2019-06-25 DIAGNOSIS — K82.9 DISEASE OF GALLBLADDER, UNSPECIFIED: Chronic | ICD-10-CM

## 2019-06-25 PROCEDURE — 99202 OFFICE O/P NEW SF 15 MIN: CPT

## 2019-06-25 NOTE — HISTORY OF PRESENT ILLNESS
[Disease: _____________________] : Disease: [unfilled] [T: ___] : T[unfilled] [N: ___] : N[unfilled] [AJCC Stage: ____] : AJCC Stage: [unfilled] [de-identified] : Ms. 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   \par \par At initial presentation, she noted L nipple inversion early August 2018 and was sent for a diagnostic mammo and sonogram by her PCP. In addition, she also had noted a left breast mass ~ 8 months ago, and began to notice more pain at the site. She has not gone for screening mammo in a few years. In the last year, she has lost both her parents, and w/ other family issues, neglected the left breast mass. \par \par Mammogram on 8/27/18 demonstrated a 3.0 cm spiculated mass in the left breast retroareolar region overlying skin retraction and nipple inversion, highly suspicious for malignancy.  US breast showed left breast retroareolar 2.5 x 2 x 3.5 cm irregular hypoechoic solid shadowing mass with ill defined borders corresponding with spiculated mass demonstrated mammographically with morphologic features highly suspicious for malignancy as well as 1.3 x 0.5 x 1.0 cm cystic cluster at 3:00 7cmfn. \par \par She saw Dr. Riggs on 8/28/18 and US guided core biopsy was performed on 8/30/18.  \par \par 8/30/18 Pathology\par 1. Left breast, retroareolar, ultrasound guided core biopsy - poorly differentiated IDC with micropapillary features. ER 90%, KY 95%, HER2 negative.\par - Steven score 8/9 (3 + 3 + 2) in this limited material.\par  - Invasive tumor measures at least 1.3 cm\par   2. Left breast, 3:00, 6 cm fn, - poorly differentiated IDC  with  micropapillary features. ER>90% KY>90%, HER2 negative\par - [place] score 8/9 \par \par 9/14/18 MRI breast - LEFT BREAST: *  An irregularly shaped enhancing mass in the left retroareolar region   is compatible with a site of biopsy-proven malignancy. It measures  approximately 2.4 x 2.1 x 2.2 centimeters (AP by TV by CC). There is  involvement of the nipple, with retraction. Susceptibility artifact from  the biopsy marker is noted in the center of the mass. *  In the 3:00 axis, middle third, there is an irregularly shaped  enhancing mass with surrounding non mass enhancement. This is compatible  with the additional site of biopsy-proven malignancy. Overall, the  abnormal enhancement measures 6.7 x 2.8 x 2.1 centimeters (AP x TV x CC).  The AP extent of the associated non mass enhancement measures  significantly larger than depicted on ultrasound, and extends towards the  site of malignancy in the retroareolar region.\par \par \par Family hx: maternal GM breast cancer (early 50's), maternal 1st cousin (late 50's), 1 maternal aunts ovarian, another maternal aunt w/ 2 unknown cancers. Father w/ prostate cancer. 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 No illicit drug use.\par \par PMH: Hashimoto's (53 y/o), was on thyroid medication in the past, no longer, HTN, arthritis, pre-diabetic, reflux (takes an occasional Zantac, and ventral hernia. Claustrophobia w/ MRI, situational anxiety/depression.\par \par Past sx hx: polyp removal (was told benign), cholecystomy, L knee sx. \par \par Health Care Providers:\par PCP: Dr. Rashard Posada ([place])\par \par Treatment summary:\par 9/28/18 - 11/9/18  dose dense AC x 4 cycles\par 11/23/18 - 2/8/19 weekly Taxol 80 mg/m2 x 12 weeks [de-identified] : poorly differentiated IDC [de-identified] : ER>90% WA>90%, HER2 negative [de-identified] : post neoadjuvant DDAC - ypT1c ypN1a = IIA [de-identified] : Remains on letrozole. Denies fevers, no SOB, no CP, appetite is good, no constipation, has had some diarrhea 3 x's a day for 4 days, has not had any in 3 days. Drinking fluids. LBM today was normal. No pain/burning w/ urination. Has noted intermittent ankle swelling resolves w/ elevation. Energy level is fair. Her hair is growing back. RT began 5//29 w/ a 7/03 stop date in chart. Jeanne believes her stop date is ~ 7/10. Tolerating well.  Wants me to take a look at lump just beneath SUNNI flap incision on R & L side, has noted for ~ 2 weeks, before this there was no pain. When she stands her abdominal tissue is more protuberant, she is wondering if she is developing a hernia. Her neuropathy persists and does not feel the gabapentin increase has made a difference. The remainder of ROS is negative.\par

## 2019-06-25 NOTE — ASSESSMENT
[FreeTextEntry1] : 57 y/o postmenopausal female with locally advanced poorly differentiated left breast IDC,  ER>90%, UT>90%, HER2 negative.  Breast MRI with multicentric disease in left breast, largest abnormal enhancement measures 6.7 cm and extends towards 2.4 cm retroareolar mass.  Completed NACT with DD AC followed by Taxol on 2/8/19.\par S/p bilateral mastectomy with SUNNI reconstruction on 3/27/19. She has residual ypT1c ypN1 disease (2/7 LN positive with extracapsular extension + LVI).  There were no clinically abnormal nodes on presurgery MRIs or exam. Strted PMRT given residual kevin disease post NACT on 5/29 (w/ expected end date of 7/03/19). Started Letrozole ~ 4/26/19.\par \par \par Plan:\par - Continue PMRT w/ Dr. Carl.\par - Continue Letrozole\par - Neuropathy: not improved w/ Gabapentin, despite increase--> requested that she reduce the Gabapentin by 400 mg every 3 days. Will plan on changing to Duloxetine. She is on Lexapro 20 mg daily. Decrease to 10 mg daily for 4 days then stop. Then begin Duloxetine 30 mg (w/ plans if tolerated, to increase to 60 mg). Spoke w/ her on 6/17. Start tapers tomorrow. States she understands.\par - Reconstructed SUNNI flap incision: 2 mirrored areas of nodularity on R and Left incision, ? scar tissue. Soft area of protuberance upon standing ? hernia. Advised to contact Dr. Hopson's office for evaluation.\par - F/u in 1 month w/ Dr. Narvaez.

## 2019-06-25 NOTE — PHYSICAL EXAM
[Restricted in physically strenuous activity but ambulatory and able to carry out work of a light or sedentary nature] : Status 1- Restricted in physically strenuous activity but ambulatory and able to carry out work of a light or sedentary nature, e.g., light house work, office work [Obese] : obese [Normal] : pharynx is unremarkable, moist mucus membrane, no oral lesions [de-identified] : negative cervical, supra/infraclavicular adenopathy. [de-identified] : clear to auscultation and percussion. [de-identified] : S1/S2 [de-identified] : negative pedal edema or calve tenderness [de-identified] : bilateral reconstructed breasts with well healed incisions. Mild L RT skin changes- no desquamation. Negative axillary adenopathy bilaterally. [de-identified] : BS+, soft, nontender on palpation. Abdominal incision with a mirrored palpable nodularity to R and L incision. Upon standing soft area of protuberance mid abdomen.   [de-identified] : without spinal or cva tenderness. [de-identified] : Faint RT skin changes.

## 2019-06-28 ENCOUNTER — APPOINTMENT (OUTPATIENT)
Dept: DERMATOLOGY | Facility: CLINIC | Age: 59
End: 2019-06-28
Payer: COMMERCIAL

## 2019-06-28 PROCEDURE — 99213 OFFICE O/P EST LOW 20 MIN: CPT

## 2019-07-02 ENCOUNTER — RX RENEWAL (OUTPATIENT)
Age: 59
End: 2019-07-02

## 2019-07-09 ENCOUNTER — RX RENEWAL (OUTPATIENT)
Age: 59
End: 2019-07-09

## 2019-07-09 ENCOUNTER — APPOINTMENT (OUTPATIENT)
Dept: CT IMAGING | Facility: CLINIC | Age: 59
End: 2019-07-09

## 2019-07-09 DIAGNOSIS — B02.8 ZOSTER WITH OTHER COMPLICATIONS: ICD-10-CM

## 2019-07-19 VITALS
SYSTOLIC BLOOD PRESSURE: 126 MMHG | OXYGEN SATURATION: 97 % | WEIGHT: 221 LBS | RESPIRATION RATE: 16 BRPM | DIASTOLIC BLOOD PRESSURE: 85 MMHG | HEIGHT: 65 IN | HEART RATE: 81 BPM | BODY MASS INDEX: 36.82 KG/M2

## 2019-07-19 NOTE — REVIEW OF SYSTEMS
[Edema Limbs: Grade 0] : Edema Limbs: Grade 0  [Fatigue: Grade 0] : Fatigue: Grade 0 [Localized Edema: Grade 2 - Moderate localized edema and intervention indicated; limiting instrumental ADL] : Localized Edema: Grade 2 - Moderate localized edema and intervention indicated; limiting instrumental ADL [Neck Edema: Grade 0] : Neck Edema: Grade 0 [Breast Pain: Grade 1 - Mild pain] : Breast Pain: Grade 1 - Mild pain [Pruritus: Grade 0] : Pruritus: Grade 0 [Skin Atrophy: Grade 0] : Skin Atrophy: Grade 0 [Skin Hyperpigmentation: Grade 0] : Skin Hyperpigmentation: Grade 0 [Skin Induration: Grade 0] : Skin Induration: Grade 0 [Dermatitis Radiation: Grade 0] : Dermatitis Radiation: Grade 0 [FreeTextEntry4] : left reconstruction [FreeTextEntry7] : pulling

## 2019-07-19 NOTE — VITALS
[Maximal Pain Intensity: 7/10] : 7/10 [Least Pain Intensity: 6/10] : 6/10 [Pain Description/Quality: ___] : Pain description/quality: [unfilled] [Pain Duration: ___] : Pain duration: [unfilled] [Pain Location: ___] : Pain Location: [unfilled] [Adjuvant (neuroleptics)] : adjuvant (neuroleptics) [80: Normal activity with effort; some signs or symptoms of disease.] : 80: Normal activity with effort; some signs or symptoms of disease.  [Pain Interferes with ADLs] : Pain does not interfere with activities of daily living

## 2019-07-19 NOTE — DISEASE MANAGEMENT
[Pathological] : TNM Stage: p [IIB] : IIB [FreeTextEntry4] : IDC left breast [TTNM] : 1c [NTNM] : 1a [MTNM] : 0 [de-identified] : 6990 [de-identified] : 4545 [de-identified] : left chest wall

## 2019-07-19 NOTE — PHYSICAL EXAM
[Normal] : oriented to person, place and time, the affect was normal, the mood was normal and not anxious [de-identified] : asymmetry of reconstruction, with L>R; no warmth, erythema, tenderness. [de-identified] : shingles lesions have closed, no longer open or weeping

## 2019-07-22 ENCOUNTER — OUTPATIENT (OUTPATIENT)
Dept: OUTPATIENT SERVICES | Facility: HOSPITAL | Age: 59
LOS: 1 days | Discharge: HOME | End: 2019-07-22

## 2019-07-22 DIAGNOSIS — C50.919 MALIGNANT NEOPLASM OF UNSPECIFIED SITE OF UNSPECIFIED FEMALE BREAST: ICD-10-CM

## 2019-07-22 DIAGNOSIS — Z98.890 OTHER SPECIFIED POSTPROCEDURAL STATES: Chronic | ICD-10-CM

## 2019-07-22 DIAGNOSIS — K82.9 DISEASE OF GALLBLADDER, UNSPECIFIED: Chronic | ICD-10-CM

## 2019-07-22 PROCEDURE — 77387C: CUSTOM

## 2019-07-23 PROCEDURE — 77427 RADIATION TX MANAGEMENT X5: CPT

## 2019-07-23 PROCEDURE — 77387C: CUSTOM

## 2019-07-24 PROCEDURE — 77387C: CUSTOM

## 2019-07-25 VITALS
RESPIRATION RATE: 16 BRPM | WEIGHT: 218 LBS | HEIGHT: 65 IN | SYSTOLIC BLOOD PRESSURE: 112 MMHG | HEART RATE: 100 BPM | TEMPERATURE: 98.7 F | OXYGEN SATURATION: 94 % | BODY MASS INDEX: 36.32 KG/M2 | DIASTOLIC BLOOD PRESSURE: 80 MMHG

## 2019-07-25 PROCEDURE — 77387C: CUSTOM

## 2019-07-25 NOTE — VITALS
[Pain Description/Quality: ___] : Pain description/quality: [unfilled] [Pain Duration: ___] : Pain duration: [unfilled] [Pain Location: ___] : Pain Location: [unfilled] [Adjuvant (neuroleptics)] : adjuvant (neuroleptics) [80: Normal activity with effort; some signs or symptoms of disease.] : 80: Normal activity with effort; some signs or symptoms of disease.  [Maximal Pain Intensity: 4/10] : 4/10 [Least Pain Intensity: 3/10] : 3/10 [Pain Interferes with ADLs] : Pain does not interfere with activities of daily living

## 2019-07-25 NOTE — DISEASE MANAGEMENT
[Pathological] : TNM Stage: p [IIB] : IIB [FreeTextEntry4] : IDC left breast [TTNM] : 1c [NTNM] : 1a [MTNM] : 0 [de-identified] : 2351 [de-identified] : 4781 [de-identified] : left chest wall

## 2019-07-25 NOTE — PHYSICAL EXAM
[Normal] : oriented to person, place and time, the affect was normal, the mood was normal and not anxious [de-identified] : no warmth, erythema, tenderness. [de-identified] : shingles lesions have closed, no longer open or weeping

## 2019-07-25 NOTE — REVIEW OF SYSTEMS
[Edema Limbs: Grade 0] : Edema Limbs: Grade 0  [Fatigue: Grade 0] : Fatigue: Grade 0 [Localized Edema: Grade 2 - Moderate localized edema and intervention indicated; limiting instrumental ADL] : Localized Edema: Grade 2 - Moderate localized edema and intervention indicated; limiting instrumental ADL [Neck Edema: Grade 0] : Neck Edema: Grade 0 [Breast Pain: Grade 1 - Mild pain] : Breast Pain: Grade 1 - Mild pain [Pruritus: Grade 0] : Pruritus: Grade 0 [Skin Atrophy: Grade 0] : Skin Atrophy: Grade 0 [Skin Hyperpigmentation: Grade 0] : Skin Hyperpigmentation: Grade 0 [Skin Induration: Grade 0] : Skin Induration: Grade 0 [Dermatitis Radiation: Grade 0] : Dermatitis Radiation: Grade 0 [FreeTextEntry4] : left reconstruction [FreeTextEntry7] : sensitivity

## 2019-07-26 ENCOUNTER — APPOINTMENT (OUTPATIENT)
Dept: HEMATOLOGY ONCOLOGY | Facility: CLINIC | Age: 59
End: 2019-07-26
Payer: COMMERCIAL

## 2019-07-26 ENCOUNTER — RESULT REVIEW (OUTPATIENT)
Age: 59
End: 2019-07-26

## 2019-07-26 ENCOUNTER — APPOINTMENT (OUTPATIENT)
Age: 59
End: 2019-07-26

## 2019-07-26 VITALS
HEIGHT: 65 IN | BODY MASS INDEX: 36.32 KG/M2 | DIASTOLIC BLOOD PRESSURE: 85 MMHG | TEMPERATURE: 98.3 F | OXYGEN SATURATION: 96 % | HEART RATE: 106 BPM | SYSTOLIC BLOOD PRESSURE: 132 MMHG | WEIGHT: 218 LBS

## 2019-07-26 DIAGNOSIS — B02.9 ZOSTER W/OUT COMPLICATIONS: ICD-10-CM

## 2019-07-26 LAB
BASOPHILS # BLD AUTO: 0.1 K/UL — SIGNIFICANT CHANGE UP (ref 0–0.2)
BASOPHILS NFR BLD AUTO: 1.5 % — SIGNIFICANT CHANGE UP (ref 0–2)
EOSINOPHIL # BLD AUTO: 0.2 K/UL — SIGNIFICANT CHANGE UP (ref 0–0.5)
EOSINOPHIL NFR BLD AUTO: 4.8 % — SIGNIFICANT CHANGE UP (ref 0–6)
HCT VFR BLD CALC: 48.4 % — HIGH (ref 34.5–45)
HGB BLD-MCNC: 16.4 G/DL — HIGH (ref 11.5–15.5)
LYMPHOCYTES # BLD AUTO: 0.4 K/UL — LOW (ref 1–3.3)
LYMPHOCYTES # BLD AUTO: 9.1 % — LOW (ref 13–44)
MCHC RBC-ENTMCNC: 30.6 PG — SIGNIFICANT CHANGE UP (ref 27–34)
MCHC RBC-ENTMCNC: 33.9 G/DL — SIGNIFICANT CHANGE UP (ref 32–36)
MCV RBC AUTO: 90.4 FL — SIGNIFICANT CHANGE UP (ref 80–100)
MONOCYTES # BLD AUTO: 0.3 K/UL — SIGNIFICANT CHANGE UP (ref 0–0.9)
MONOCYTES NFR BLD AUTO: 7.2 % — SIGNIFICANT CHANGE UP (ref 2–14)
NEUTROPHILS # BLD AUTO: 3.3 K/UL — SIGNIFICANT CHANGE UP (ref 1.8–7.4)
NEUTROPHILS NFR BLD AUTO: 77.4 % — HIGH (ref 43–77)
PLATELET # BLD AUTO: 221 K/UL — SIGNIFICANT CHANGE UP (ref 150–400)
RBC # BLD: 5.36 M/UL — HIGH (ref 3.8–5.2)
RBC # FLD: 14.2 % — SIGNIFICANT CHANGE UP (ref 10.3–14.5)
WBC # BLD: 4.2 K/UL — SIGNIFICANT CHANGE UP (ref 3.8–10.5)
WBC # FLD AUTO: 4.2 K/UL — SIGNIFICANT CHANGE UP (ref 3.8–10.5)

## 2019-07-26 PROCEDURE — 77387C: CUSTOM

## 2019-07-26 PROCEDURE — 99214 OFFICE O/P EST MOD 30 MIN: CPT

## 2019-07-26 RX ORDER — ACYCLOVIR 800 MG/1
800 TABLET ORAL
Qty: 50 | Refills: 0 | Status: DISCONTINUED | COMMUNITY
Start: 2019-06-24 | End: 2019-07-26

## 2019-07-26 NOTE — ADDENDUM
[FreeTextEntry1] : I, Raysa Magallanes, acted solely as a scribe for Dr. Olimpia Narvaez on this date 7/26/19.

## 2019-07-26 NOTE — HISTORY OF PRESENT ILLNESS
[Disease: _____________________] : Disease: [unfilled] [T: ___] : T[unfilled] [N: ___] : N[unfilled] [AJCC Stage: ____] : AJCC Stage: [unfilled] [de-identified] : Ms. KRISTIE GARZON, is a postmenopausal female who was diagnosed with poorly differentiated invasive ductal carcinoma of left breast cancer, ER >90% CT>90%, HER2 negative.   \par \par At initial presentation, she noted L nipple inversion early August 2018 and was sent for a diagnostic mammo and sonogram by her PCP. In addition, she also had noted a left breast mass ~ 8 months ago, and began to notice more pain at the site. She has not gone for screening mammo in a few years. In the last year, she has lost both her parents, and w/ other family issues, neglected the left breast mass. \par \par Mammogram on 8/27/18 demonstrated a 3.0 cm spiculated mass in the left breast retroareolar region overlying skin retraction and nipple inversion, highly suspicious for malignancy.  US breast showed left breast retroareolar 2.5 x 2 x 3.5 cm irregular hypoechoic solid shadowing mass with ill defined borders corresponding with spiculated mass demonstrated mammographically with morphologic features highly suspicious for malignancy as well as 1.3 x 0.5 x 1.0 cm cystic cluster at 3:00 7cmfn. \par \par She saw Dr. Riggs on 8/28/18 and US guided core biopsy was performed on 8/30/18.  \par \par 8/30/18 Pathology\par 1. Left breast, retroareolar, ultrasound guided core biopsy - poorly differentiated IDC with micropapillary features. ER 90%, CT 95%, HER2 negative.\par - Steven score 8/9 (3 + 3 + 2) in this limited material.\par  - Invasive tumor measures at least 1.3 cm\par   2. Left breast, 3:00, 6 cm fn, - poorly differentiated IDC  with  micropapillary features. ER>90% CT>90%, HER2 negative\par - Sylvia score 8/9 \par \par 9/14/18 MRI breast - LEFT BREAST: *  An irregularly shaped enhancing mass in the left retroareolar region   is compatible with a site of biopsy-proven malignancy. It measures  approximately 2.4 x 2.1 x 2.2 centimeters (AP by TV by CC). There is  involvement of the nipple, with retraction. Susceptibility artifact from  the biopsy marker is noted in the center of the mass. *  In the 3:00 axis, middle third, there is an irregularly shaped  enhancing mass with surrounding non mass enhancement. This is compatible  with the additional site of biopsy-proven malignancy. Overall, the  abnormal enhancement measures 6.7 x 2.8 x 2.1 centimeters (AP x TV x CC).  The AP extent of the associated non mass enhancement measures  significantly larger than depicted on ultrasound, and extends towards the  site of malignancy in the retroareolar region.\par \par \par Family hx: maternal GM breast cancer (early 50's), maternal 1st cousin (late 50's), 1 maternal aunts ovarian, another maternal aunt w/ 2 unknown cancers. Father w/ prostate cancer. 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 No illicit drug use.\par \par PMH: Hashimoto's (55 y/o), was on thyroid medication in the past, no longer, HTN, arthritis, pre-diabetic, reflux (takes an occasional Zantac, and ventral hernia. Claustrophobia w/ MRI, situational anxiety/depression.\par \par Past sx hx: polyp removal (was told benign), cholecystomy, L knee sx. \par \par Health Care Providers:\par PCP: Dr. Rashard Posada ([place])\par \par Treatment summary:\par 9/28/18 - 11/9/18  dose dense AC x 4 cycles\par 11/23/18 - 2/8/19 weekly Taxol 80 mg/m2 x 12 weeks [de-identified] : poorly differentiated IDC [de-identified] : Patient returns for follow up. Began Letrozole on 4/26/19.\par  Intermittent hot flashes 4-5 times daily, including twice per night. Tolerable, not impacting quality of life. \par Restarted RT Monday, 7/22/19. Had been held for 3 weeks due to herpes zoster under her left breast and on her back. \par The skin lesion have healed but continues to have "grabbing pain" for which she takes tramadol as needed. \par Fatigue. No significant joint pain. \par Otherwise doing well.  [de-identified] : post neoadjuvant DDAC - ypT1c ypN1a = IIA [de-identified] : ER>90% SC>90%, HER2 negative

## 2019-07-26 NOTE — ASSESSMENT
[FreeTextEntry1] : 59 y/o postmenopausal female with locally advanced poorly differentiated left breast IDC,  ER>90%, TX>90%, HER2 negative.  Breast MRI with multicentric disease in left breast, largest abnormal enhancement measures 6.7 cm and extends towards 2.4 cm retroareolar mass.  Completed NACT with DD AC followed by Taxol on 2/8/19.\par S/p bilateral mastectomy with SUNNI reconstruction on 3/27/19. She has residual ypT1c ypN1 disease (2/7 LN positive with extracapsular extension + LVI).  There were no clinically abnormal nodes on presurgery MRIs or exam. Started PMRT given residual kevin disease post NACT on 5/29/19 which was interruped due to shingles in RT field.  Resumed RT 7/22/19.   Started Letrozole ~ 4/26/19.\par \par Doing reasonably well on Letrozole. Mild hot flashes. \par \par Plan:\par - Continue PMRT w/ Dr. Carl.\par - Continue Letrozole\par - Follow up with Dr. Riggs, has CT chest pending to follow up on subcentimeter lung nodules\par - RTO 4 months

## 2019-07-26 NOTE — PHYSICAL EXAM
[Restricted in physically strenuous activity but ambulatory and able to carry out work of a light or sedentary nature] : Status 1- Restricted in physically strenuous activity but ambulatory and able to carry out work of a light or sedentary nature, e.g., light house work, office work [Obese] : obese [Normal] : affect appropriate [de-identified] : clear [de-identified] : negative cervical [de-identified] : no edema [de-identified] : S1/S2 [de-identified] : bilateral reconstructed breasts with well healed incisions. [de-identified] : healed shingles lesions below L breast and upper L back [de-identified] : BS+, soft, n.   [de-identified] : without spinal or cva tenderness.

## 2019-07-29 PROCEDURE — 77387C: CUSTOM

## 2019-07-30 PROCEDURE — 77387C: CUSTOM

## 2019-07-30 PROCEDURE — 77427 RADIATION TX MANAGEMENT X5: CPT

## 2019-07-31 PROCEDURE — 77387C: CUSTOM

## 2019-08-01 ENCOUNTER — APPOINTMENT (OUTPATIENT)
Dept: CT IMAGING | Facility: CLINIC | Age: 59
End: 2019-08-01
Payer: COMMERCIAL

## 2019-08-01 ENCOUNTER — OUTPATIENT (OUTPATIENT)
Dept: OUTPATIENT SERVICES | Facility: HOSPITAL | Age: 59
LOS: 1 days | End: 2019-08-01

## 2019-08-01 VITALS
BODY MASS INDEX: 36.65 KG/M2 | DIASTOLIC BLOOD PRESSURE: 80 MMHG | WEIGHT: 220 LBS | RESPIRATION RATE: 16 BRPM | HEIGHT: 65 IN | SYSTOLIC BLOOD PRESSURE: 151 MMHG | HEART RATE: 100 BPM | OXYGEN SATURATION: 97 % | TEMPERATURE: 98.7 F

## 2019-08-01 DIAGNOSIS — R93.89 ABNORMAL FINDINGS ON DIAGNOSTIC IMAGING OF OTHER SPECIFIED BODY STRUCTURES: ICD-10-CM

## 2019-08-01 DIAGNOSIS — K82.9 DISEASE OF GALLBLADDER, UNSPECIFIED: Chronic | ICD-10-CM

## 2019-08-01 DIAGNOSIS — Z98.890 OTHER SPECIFIED POSTPROCEDURAL STATES: Chronic | ICD-10-CM

## 2019-08-01 PROCEDURE — 77387C: CUSTOM

## 2019-08-01 PROCEDURE — 71260 CT THORAX DX C+: CPT | Mod: 26

## 2019-08-01 NOTE — DISEASE MANAGEMENT
[Pathological] : TNM Stage: p [IIB] : IIB [TTNM] : 1c [FreeTextEntry4] : IDC left breast, ER+/[place]+, HER2-, grade 3 [NTNM] : 1a [MTNM] : 0 [de-identified] : 3761 [de-identified] : 5335 [de-identified] : left chest wall

## 2019-08-01 NOTE — VITALS
[Maximal Pain Intensity: 4/10] : 4/10 [Pain Description/Quality: ___] : Pain description/quality: [unfilled] [Pain Duration: ___] : Pain duration: [unfilled] [Pain Location: ___] : Pain Location: [unfilled] [80: Normal activity with effort; some signs or symptoms of disease.] : 80: Normal activity with effort; some signs or symptoms of disease.  [Adjuvant (neuroleptics)] : adjuvant (neuroleptics) [Least Pain Intensity: 0/10] : 0/10 [Pain Interferes with ADLs] : Pain does not interfere with activities of daily living

## 2019-08-01 NOTE — REVIEW OF SYSTEMS
[Localized Edema: Grade 2 - Moderate localized edema and intervention indicated; limiting instrumental ADL] : Localized Edema: Grade 2 - Moderate localized edema and intervention indicated; limiting instrumental ADL [Fatigue: Grade 0] : Fatigue: Grade 0 [Edema Limbs: Grade 0] : Edema Limbs: Grade 0  [Neck Edema: Grade 0] : Neck Edema: Grade 0 [Breast Pain: Grade 1 - Mild pain] : Breast Pain: Grade 1 - Mild pain [Pruritus: Grade 0] : Pruritus: Grade 0 [Skin Atrophy: Grade 0] : Skin Atrophy: Grade 0 [Skin Hyperpigmentation: Grade 0] : Skin Hyperpigmentation: Grade 0 [Skin Induration: Grade 0] : Skin Induration: Grade 0 [FreeTextEntry4] : left reconstruction [FreeTextEntry7] : sensitivity  [Dermatitis Radiation: Grade 1 - Faint erythema or dry desquamation] : Dermatitis Radiation: Grade 1 - Faint erythema or dry desquamation

## 2019-08-01 NOTE — PHYSICAL EXAM
[Normal] : oriented to person, place and time, the affect was normal, the mood was normal and not anxious [de-identified] : no warmth, erythema, tenderness. [de-identified] : shingles lesions have closed, no longer open or weeping, unchanged

## 2019-08-02 PROCEDURE — 77387C: CUSTOM

## 2019-08-05 PROCEDURE — 77387C: CUSTOM

## 2019-08-06 PROCEDURE — 77387C: CUSTOM

## 2019-08-06 PROCEDURE — 77427 RADIATION TX MANAGEMENT X5: CPT

## 2019-08-09 ENCOUNTER — RX RENEWAL (OUTPATIENT)
Age: 59
End: 2019-08-09

## 2019-08-13 ENCOUNTER — APPOINTMENT (OUTPATIENT)
Dept: PLASTIC SURGERY | Facility: CLINIC | Age: 59
End: 2019-08-13
Payer: COMMERCIAL

## 2019-08-13 VITALS
TEMPERATURE: 98.1 F | SYSTOLIC BLOOD PRESSURE: 126 MMHG | DIASTOLIC BLOOD PRESSURE: 87 MMHG | OXYGEN SATURATION: 95 % | WEIGHT: 220 LBS | HEART RATE: 90 BPM | HEIGHT: 65 IN | BODY MASS INDEX: 36.65 KG/M2

## 2019-08-13 PROCEDURE — 99214 OFFICE O/P EST MOD 30 MIN: CPT

## 2019-08-13 NOTE — PHYSICAL EXAM
[NI] : Normal [de-identified] : Bilateral incisions healing well. No evidence of infection.\par Breasts are soft.\par  [de-identified] : Incisions healing well. No evidence of infection.\par Umbilicus viable. +dog ear deformity bilaterally on the abdomen incision. \par

## 2019-08-13 NOTE — ASSESSMENT
[FreeTextEntry1] : 57 y/o woman with personal history of left breast cancer s/p bilateral mastectomy with SUNNI flap reconstruction on 3/27/19. She is doing well  and recently completed radiation 8/6/19. We discussed revision surgery to include reduction, lift and nipple/areola reconstruction. She will also require bilateral dog ear repair of the abdominal incision. Return to office in December to start revision surgery planning.

## 2019-08-13 NOTE — HISTORY OF PRESENT ILLNESS
[FreeTextEntry1] : 57 y/o woman with personal history of left breast cancer s/p bilateral mastectomy with SUNNI flap reconstruction on 3/27/19.\par \par She is doing well and denies any issues or complaints. She presents to discuss revision surgery.

## 2019-08-30 ENCOUNTER — OTHER (OUTPATIENT)
Age: 59
End: 2019-08-30

## 2019-09-11 ENCOUNTER — APPOINTMENT (OUTPATIENT)
Dept: SURGICAL ONCOLOGY | Facility: CLINIC | Age: 59
End: 2019-09-11

## 2019-09-13 ENCOUNTER — APPOINTMENT (OUTPATIENT)
Dept: RADIATION ONCOLOGY | Facility: CLINIC | Age: 59
End: 2019-09-13

## 2019-09-18 ENCOUNTER — APPOINTMENT (OUTPATIENT)
Dept: SURGICAL ONCOLOGY | Facility: CLINIC | Age: 59
End: 2019-09-18

## 2019-09-24 ENCOUNTER — APPOINTMENT (OUTPATIENT)
Dept: SURGERY | Facility: CLINIC | Age: 59
End: 2019-09-24
Payer: COMMERCIAL

## 2019-09-24 VITALS
SYSTOLIC BLOOD PRESSURE: 113 MMHG | HEART RATE: 88 BPM | HEIGHT: 65 IN | DIASTOLIC BLOOD PRESSURE: 80 MMHG | WEIGHT: 223 LBS | BODY MASS INDEX: 37.15 KG/M2

## 2019-09-24 DIAGNOSIS — R51 HEADACHE: ICD-10-CM

## 2019-09-24 DIAGNOSIS — M79.89 OTHER SPECIFIED SOFT TISSUE DISORDERS: ICD-10-CM

## 2019-09-24 PROCEDURE — 99215 OFFICE O/P EST HI 40 MIN: CPT

## 2019-09-25 ENCOUNTER — APPOINTMENT (OUTPATIENT)
Dept: SURGICAL ONCOLOGY | Facility: CLINIC | Age: 59
End: 2019-09-25
Payer: COMMERCIAL

## 2019-09-25 VITALS
HEIGHT: 65 IN | HEART RATE: 102 BPM | DIASTOLIC BLOOD PRESSURE: 94 MMHG | WEIGHT: 223 LBS | SYSTOLIC BLOOD PRESSURE: 147 MMHG | BODY MASS INDEX: 37.15 KG/M2

## 2019-09-25 DIAGNOSIS — M79.89 OTHER SPECIFIED SOFT TISSUE DISORDERS: ICD-10-CM

## 2019-09-25 PROCEDURE — 99245 OFF/OP CONSLTJ NEW/EST HI 55: CPT

## 2019-09-25 NOTE — PHYSICAL EXAM
[Normal] : supple, no neck mass and thyroid not enlarged [Normal Neck Lymph Nodes] : normal neck lymph nodes  [Normal Supraclavicular Lymph Nodes] : normal supraclavicular lymph nodes [Normal] : oriented to person, place and time, with appropriate affect [de-identified] : Approximately 3 cm intramuscular mass mid back to the left of the thoracic spine.  Large mass right upper back.

## 2019-09-25 NOTE — ASSESSMENT
[FreeTextEntry1] : 58 year-old female presents for an initial consultation.  Her past medical history includes HTN, HLD, Hashimotos, diverticulitis and left breast cancer for which she underwent neoadjuvant chemotherapy followed by bilateral mastectomies in March 2019.  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  Subsequent bone scan showed no evidence of osseus metastases.  She had a follow up chest CT 8/1/19 to assess stability of pulmonary nodules, which re demonstrated a lipoma in the left paraspinal musculature.  \par \par Jeanne states that the mass has been present for a few years.  She notes an additional larger mass involving her right upper back.  Both masses had been enlarging over time, however, she believes that both masses have decreased in size since she completed chemotherapy.  There is no associated pain or tenderness. \par \par She has a family  history of breast cancer involving her maternal grandmother in her 40's, maternal cousin in her 50s and ovarian cancer in a maternal aunt.  Her sister is BRCA negative.  Jeanne has not undergone genetic testing but is interested in doing so. \par \par A&P:\par Patient with history of 2 enlarging soft tissue masses involving her left mid and right upper back.  One of which was imaged during recent breast cancer work up. ...............She was recently treated for breast cancer and has a significant family history of malignancies as well.  We will send a comprehensive genetic testing panel in the office today. I have reviewed the pertinent imaging, blood work and pathology. \par \par patient is agreeable to surgery\par \par PCP: Dr. Rashard Posada\par Referring MD: Dr. Katy Riggs

## 2019-09-25 NOTE — HISTORY OF PRESENT ILLNESS
[de-identified] : 58 year-old female presents for an initial consultation.  Her past medical history includes HTN, HLD, Hashimotos, diverticulitis and left breast cancer for which she underwent neoadjuvant chemotherapy followed by bilateral mastectomies in March 2019.  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  Subsequent bone scan showed no evidence of osseus metastases.  She had a follow up chest CT 8/1/19 to assess stability of pulmonary nodules, which re demonstrated a lipoma in the left paraspinal musculature.  \par \par Jeanne states that the mass has been present for a few years.  She notes an additional larger mass involving her right upper back.  Both masses had been enlarging over time, however, she believes that both masses have decreased in size since she completed chemotherapy.  There is no associated pain or tenderness. \par \par She has a family  history of breast cancer involving her maternal grandmother in her 40's, maternal cousin in her 50s and ovarian cancer in a maternal aunt.  Her sister is BRCA negative.  Jeanne has not undergone genetic testing but is interested in doing so. \par \par PCP: Dr. Rashard Posada\par Referring MD: Dr. Katy Riggs

## 2019-09-25 NOTE — CONSULT LETTER
[Dear  ___] : Dear  [unfilled], [Consult Letter:] : I had the pleasure of evaluating your patient, [unfilled]. [Consult Closing:] : Thank you very much for allowing me to participate in the care of this patient.  If you have any questions, please do not hesitate to contact me. [Sincerely,] : Sincerely, [DrElizabeth  ___] : Dr. MOSCOSO [FreeTextEntry2] : Katy Riggs MD [FreeTextEntry3] : Viral Greene MD, FACS, FASCRS\par , Department of Surgery\par Director of the [facility] [place]\par , Minimally Invasive/Robotic Cancer Surgery, Central & [facility] Divisions\par Division of Surgical Oncology  [FreeTextEntry1] : 58 year-old female presents for an initial consultation.  Her past medical history includes HTN, HLD, Hashimotos, diverticulitis and left breast cancer for which she underwent neoadjuvant chemotherapy followed by bilateral mastectomies in March 2019.  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  Subsequent bone scan showed no evidence of osseus metastases.  She had a follow up chest CT 8/1/19 to assess stability of pulmonary nodules, which re demonstrated a lipoma in the left paraspinal musculature.  \par \par Jeanne states that the mass has been present for a few years.  She notes an additional larger mass involving her right upper back.  Both masses had been enlarging over time, however, she believes that both masses have decreased in size since she completed chemotherapy.  There is no associated pain or tenderness. \par \par She has a family  history of breast cancer involving her maternal grandmother in her 40's, maternal cousin in her 50s and ovarian cancer in a maternal aunt.  Her sister is BRCA negative.  Jeanne has not undergone genetic testing but is interested in doing so. \par \par A&P:\par Patient with history of 2 enlarging soft tissue masses involving her left mid and right upper back.  One of which was imaged during recent breast cancer work up. ...............She was recently treated for breast cancer and has a significant family history of malignancies as well.  We will send a comprehensive genetic testing panel in the office today. I have reviewed the pertinent imaging, blood work and pathology. \par \par PCP: Dr. Rashard Posada\par Referring MD: Dr. Katy Riggs

## 2019-10-02 ENCOUNTER — APPOINTMENT (OUTPATIENT)
Dept: RADIATION ONCOLOGY | Facility: CLINIC | Age: 59
End: 2019-10-02
Payer: COMMERCIAL

## 2019-10-02 ENCOUNTER — FORM ENCOUNTER (OUTPATIENT)
Age: 59
End: 2019-10-02

## 2019-10-02 VITALS
HEIGHT: 65 IN | WEIGHT: 223 LBS | RESPIRATION RATE: 16 BRPM | BODY MASS INDEX: 37.15 KG/M2 | HEART RATE: 88 BPM | DIASTOLIC BLOOD PRESSURE: 60 MMHG | TEMPERATURE: 97.9 F | SYSTOLIC BLOOD PRESSURE: 130 MMHG | OXYGEN SATURATION: 94 %

## 2019-10-02 PROCEDURE — 99024 POSTOP FOLLOW-UP VISIT: CPT

## 2019-10-02 RX ORDER — LIDOCAINE AND PRILOCAINE 25; 25 MG/G; MG/G
2.5-2.5 CREAM TOPICAL
Qty: 1 | Refills: 2 | Status: DISCONTINUED | COMMUNITY
Start: 2018-09-28 | End: 2019-10-02

## 2019-10-02 RX ORDER — TRAMADOL HYDROCHLORIDE 50 MG/1
50 TABLET, COATED ORAL 4 TIMES DAILY
Qty: 28 | Refills: 0 | Status: DISCONTINUED | COMMUNITY
Start: 2019-07-09 | End: 2019-10-02

## 2019-10-02 RX ORDER — SILVER SULFADIAZINE 10 MG/G
1 CREAM TOPICAL
Qty: 1 | Refills: 1 | Status: DISCONTINUED | COMMUNITY
Start: 2019-06-24 | End: 2019-10-02

## 2019-10-02 NOTE — HISTORY OF PRESENT ILLNESS
[FreeTextEntry1] : Ms. Shook is a 58 year old woman with initial [id number] left breast invasive ductal carcinoma, s/p neoadjuvant DDAC x 4 + Taxol x 12, followed by bilateral mastectomies with SUNNI flap reconstruction on 3/27/19 with pathology showing [id number] ER+/[place]+, HER2 negative disease.  She completed post-mastectomy radiation on 8/6/19; her treatment course was interrupted due to shingles.  She continues on letrozole.\par \par CT Chest on 8/1/19 shows stable 2 mm LLL nodule and 7 mm Right sided brady facial nodule.\par \par She saw Dr Greene on 9/18/19, and will undergo an U/S left forearm to rule out lipoma.  She followed up with Dr Hopson on 8/13/19, and is hoping to undergo additional reconstructive surgery in the next 3 months.  Continues with Letrozole and will follow with Dr. Narvaez 11/29/19.\par \par She continues to have post-herpetic pain predominantly in the anterior/lateral left chest wall, with tenderness to touch. She is applying an OTC moisturizing lotion to the skin.  Reports headaches, ataxia and blurry vision for 1 month. She mentioned this to Dr. Riggs last week, and is scheduled to undergo MRI brain 10/3/19.

## 2019-10-02 NOTE — DISCUSSION/SUMMARY
[Systemic Therapy (chemotherapy, hormonal therapy, other)] : Systemic Therapy (chemotherapy, hormonal therapy, other): Yes [Need for onging (adjuvant) treatment for cancer?] : Need for onging (adjuvant) treatment for cancer? Yes [Follow up with Oncologist in _____] : Follow up with Oncologist in [unfilled] [Follow up with Surgeon in _____] : Follow up with Surgeon in [unfilled] [Scans: ______] : Scans: [unfilled] [Primary care physician] : primary care physician [Colonoscopy] : colonoscopy [Skin Checks] : skin checks [Fatigue] : fatigue [FreeTextEntry8] : Deedee Pierce [FreeTextEntry9] : 9/6/19

## 2019-10-02 NOTE — PHYSICAL EXAM
[Normal] : oriented to person, place and time, the affect was normal, the mood was normal and not anxious [de-identified] : soft-tissue masses in right superior and left mid back. [de-identified] : bilateral reconstruction, well-healed incisions, minimal hyperpigmentation of left. [de-identified] : 4+/5 strength of left shoulder movements; 5/5 strength elsewhere; gait steady

## 2019-10-02 NOTE — REVIEW OF SYSTEMS
[Visual Disturbances] : visual disturbances [Gait Disturbance] : gait disturbance [Negative] : Allergic/Immunologic [Edema Limbs: Grade 1-  5 - 10% inter-limb discrepancy in volume or circumference at point of greatest visible difference; swelling or obscuration of anatomic architecture on close inspection] : Edema Limbs: Grade 1-  5 - 10% inter-limb discrepancy in volume or circumference at point of greatest visible difference; swelling or obscuration of anatomic architecture on close inspection [Fatigue: Grade 0] : Fatigue: Grade 0 [Localized Edema: Grade 0] : Localized Edema: Grade 0  [Neck Edema: Grade 0] : Neck Edema: Grade 0 [FreeTextEntry1] : left forearm  [Breast Pain: Grade 1 - Mild pain] : Breast Pain: Grade 1 - Mild pain [Pruritus: Grade 0] : Pruritus: Grade 0 [Skin Atrophy: Grade 0] : Skin Atrophy: Grade 0 [Skin Hyperpigmentation: Grade 0] : Skin Hyperpigmentation: Grade 0 [Skin Induration: Grade 0] : Skin Induration: Grade 0 [Dermatitis Radiation: Grade 1 - Faint erythema or dry desquamation] : Dermatitis Radiation: Grade 1 - Faint erythema or dry desquamation

## 2019-10-02 NOTE — VITALS
[Maximal Pain Intensity: 1/10] : 1/10 [Pain Description/Quality: ___] : Pain description/quality: [unfilled] [Pain Duration: ___] : Pain duration: [unfilled] [Pain Location: ___] : Pain Location: [unfilled] [Pain Interferes with ADLs] : Pain interferes with activities of daily living. [NoTreatment Scheduled] : no treatment scheduled [80: Normal activity with effort; some signs or symptoms of disease.] : 80: Normal activity with effort; some signs or symptoms of disease.  [Least Pain Intensity: 0/10] : 0/10

## 2019-10-03 ENCOUNTER — APPOINTMENT (OUTPATIENT)
Dept: ULTRASOUND IMAGING | Facility: CLINIC | Age: 59
End: 2019-10-03
Payer: COMMERCIAL

## 2019-10-03 ENCOUNTER — OUTPATIENT (OUTPATIENT)
Dept: OUTPATIENT SERVICES | Facility: HOSPITAL | Age: 59
LOS: 1 days | End: 2019-10-03
Payer: COMMERCIAL

## 2019-10-03 ENCOUNTER — APPOINTMENT (OUTPATIENT)
Dept: MRI IMAGING | Facility: CLINIC | Age: 59
End: 2019-10-03
Payer: COMMERCIAL

## 2019-10-03 DIAGNOSIS — M79.89 OTHER SPECIFIED SOFT TISSUE DISORDERS: ICD-10-CM

## 2019-10-03 DIAGNOSIS — K82.9 DISEASE OF GALLBLADDER, UNSPECIFIED: Chronic | ICD-10-CM

## 2019-10-03 DIAGNOSIS — R51 HEADACHE: ICD-10-CM

## 2019-10-03 DIAGNOSIS — Z98.890 OTHER SPECIFIED POSTPROCEDURAL STATES: Chronic | ICD-10-CM

## 2019-10-03 PROCEDURE — 70553 MRI BRAIN STEM W/O & W/DYE: CPT | Mod: 26

## 2019-10-03 PROCEDURE — 76882 US LMTD JT/FCL EVL NVASC XTR: CPT

## 2019-10-03 PROCEDURE — A9585: CPT

## 2019-10-03 PROCEDURE — 70553 MRI BRAIN STEM W/O & W/DYE: CPT

## 2019-10-03 PROCEDURE — 76882 US LMTD JT/FCL EVL NVASC XTR: CPT | Mod: 26,LT

## 2019-10-15 ENCOUNTER — RX RENEWAL (OUTPATIENT)
Age: 59
End: 2019-10-15

## 2019-10-17 ENCOUNTER — RX RENEWAL (OUTPATIENT)
Age: 59
End: 2019-10-17

## 2019-10-17 RX ORDER — ESCITALOPRAM OXALATE 10 MG/1
10 TABLET ORAL
Qty: 4 | Refills: 0 | Status: DISCONTINUED | COMMUNITY
Start: 2019-03-08 | End: 2019-10-17

## 2019-10-23 ENCOUNTER — OUTPATIENT (OUTPATIENT)
Dept: OUTPATIENT SERVICES | Facility: HOSPITAL | Age: 59
LOS: 1 days | Discharge: HOME | End: 2019-10-23

## 2019-10-23 DIAGNOSIS — Z98.890 OTHER SPECIFIED POSTPROCEDURAL STATES: Chronic | ICD-10-CM

## 2019-10-23 DIAGNOSIS — C50.919 MALIGNANT NEOPLASM OF UNSPECIFIED SITE OF UNSPECIFIED FEMALE BREAST: ICD-10-CM

## 2019-10-23 DIAGNOSIS — K82.9 DISEASE OF GALLBLADDER, UNSPECIFIED: Chronic | ICD-10-CM

## 2019-10-24 ENCOUNTER — RESULT REVIEW (OUTPATIENT)
Age: 59
End: 2019-10-24

## 2019-10-24 ENCOUNTER — APPOINTMENT (OUTPATIENT)
Age: 59
End: 2019-10-24

## 2019-10-24 ENCOUNTER — LABORATORY RESULT (OUTPATIENT)
Age: 59
End: 2019-10-24

## 2019-10-24 LAB
BASOPHILS # BLD AUTO: 0.1 K/UL — SIGNIFICANT CHANGE UP (ref 0–0.2)
BASOPHILS NFR BLD AUTO: 1.6 % — SIGNIFICANT CHANGE UP (ref 0–2)
EOSINOPHIL # BLD AUTO: 0.2 K/UL — SIGNIFICANT CHANGE UP (ref 0–0.5)
EOSINOPHIL NFR BLD AUTO: 6.4 % — HIGH (ref 0–6)
HCT VFR BLD CALC: 44.2 % — SIGNIFICANT CHANGE UP (ref 34.5–45)
HGB BLD-MCNC: 14.4 G/DL — SIGNIFICANT CHANGE UP (ref 11.5–15.5)
LYMPHOCYTES # BLD AUTO: 0.5 K/UL — LOW (ref 1–3.3)
LYMPHOCYTES # BLD AUTO: 14 % — SIGNIFICANT CHANGE UP (ref 13–44)
MCHC RBC-ENTMCNC: 31.4 PG — SIGNIFICANT CHANGE UP (ref 27–34)
MCHC RBC-ENTMCNC: 32.7 G/DL — SIGNIFICANT CHANGE UP (ref 32–36)
MCV RBC AUTO: 96 FL — SIGNIFICANT CHANGE UP (ref 80–100)
MONOCYTES # BLD AUTO: 0.4 K/UL — SIGNIFICANT CHANGE UP (ref 0–0.9)
MONOCYTES NFR BLD AUTO: 9 % — SIGNIFICANT CHANGE UP (ref 2–14)
NEUTROPHILS # BLD AUTO: 2.7 K/UL — SIGNIFICANT CHANGE UP (ref 1.8–7.4)
NEUTROPHILS NFR BLD AUTO: 69.1 % — SIGNIFICANT CHANGE UP (ref 43–77)
PLATELET # BLD AUTO: 215 K/UL — SIGNIFICANT CHANGE UP (ref 150–400)
RBC # BLD: 4.6 M/UL — SIGNIFICANT CHANGE UP (ref 3.8–5.2)
RBC # FLD: 11.7 % — SIGNIFICANT CHANGE UP (ref 10.3–14.5)
WBC # BLD: 3.9 K/UL — SIGNIFICANT CHANGE UP (ref 3.8–10.5)
WBC # FLD AUTO: 3.9 K/UL — SIGNIFICANT CHANGE UP (ref 3.8–10.5)

## 2019-11-02 NOTE — HISTORY OF PRESENT ILLNESS
[FreeTextEntry1] : I had the pleasure of seeing KRISTIE GARZON in the office today for 6 month post op visit. She underwent bilateral mastectomy with left SLNB and SUNNI flap 3/27/2019. Post op she was on radiation which she completed on 8/6/2019. Patient seen and examined, she states she is tired but it is due to the letrozole, she complains of new headache and new mass in left arm which she believes is a lipoma. She denies cp, sob, nausea and vomiting. She states she is going to see Dr Greene tomorrow for possible removal of a lipoma on her back. She continues to take letrozole\par \par HPI:\par She states she felt this left breast mass 8 months ago and does not undergo annual screening mammogram. She stated in the last year she has lost both her parents and her  is an alcoholic and neglected the left breast mass. She also stated she left breast nipple discharge 2 years ago and it was clear. She recently saw her PCP who sent her for imaging demonstrated a 3.5 cm spiculated mass recommending biopsy and is highly suspicious for malignancy. 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 She underwent neoadjuvant chemotherapy and completed chemotherapy on 2/5/2019.\par \par She underwent bilateral mastectomy with left SLNB and SUNNI flap 3/27/2019.\par \par G0. Menses began at age 11.\par She denies personal history of malignancy.\par Family history is significant for maternal grandmother diagnosed with breast cancer at age 55, 2 maternal aunts diagnosed with breast cancer and maternal cousin diagnosed with breast cancer and father diagnosed with prostate cancer at age 74.\par \par Imaging: [facility] \par 8/27/2018 MAMMO DIAG KADEN BILAT \par Impression: New 3 cm irregular spiculated mass in the left retroareolar region with overlying skin retraction and nipple inversion with corresponding solid shadowing mass on sonography with morphologic features highly suspicious for malignancy. BIRADS 5 highly suggestive for malignancy. Recommendation Biopsy. \par 8/27/2018 Breast ultrasound left complete\par Impression: 3.5 cm irregular hypoechoic solid shadowing mass corresponding with the spiculated mass demonstrated mammographically in the left retroareolar region in the area of palpable concern with morphologic features highly suspicious for malignancy. BIRADS 5 highly suggestive malignancy\par \par 9/14/2018 MR breast wawic bi w cad \par Impression: Multicentric biopsy proven malignancy in the left breast. The abnormal non mass enhancement associated with the biopsy proven malignancy in the left 3:00 axis measures significantly larger in AP dimension than depicted on the ultrasound, and extends towards the site of malignancy in the retroareolar region. No MRI evidence of malignancy in the right breast. No suspicious lymphadenopathy. BIRADS 6 Known biopsy proven malignancy.\par \par 3/27/2019 Pathology: \par (Adendum diagnosis: The axillary contents were grossly re examined on 4/4/2019. No definitive tissue was seen grossly. Six additional cassettes were submitted. A single negative microscopic lymph nodes are positive for metastatic carcinoma.\par 1. Breast, right, simple mastectomy: Negative for malignancy. Atypical lobular hyperplasia. Columnar cell change. Cyst formation. Microcalcifications.\par 2. Lymph node, right internal mammary, excision: One beign lymph node.\par 3. Breast left simple mastectomy: Residual infiltrating ductal carcinoma with micropapillary features at both prior biopsy sites. Steven score for both sites: 3 for tubule formation, 3 for nuclear pleomorphism and 1 for mitotic activity (7/9). the residual carcinoma located in the retroareolar region measures 1.8 cm. The residual carcinoma located in the lower outer quadrant measures 1.1 cm. An incidental minute focus of infiltrating lobular carcinoma, measuring 1.2 mm, is identified in the lower outer quadrant. Ductal carcinoma in situ, high grade with apocrine features and ductal carcinoma in situ, solid and micropapillary type, intermedia grade with necrosis. Foci suspicious for lymphovascular space invasion. PErineural invasion is present. Previous biopsy site x2/ Microcalcifications in benign tissue.\par 4. Freeman lymph node, left axilla, excision: Benign adipose tissue. No lymph node identified. \par 5. Axillary contents, left, excision: 2/6 lymph nodes are positive for metastatic carcinoma. The largest lymph node metastasis measures 0.4 cm. Extranodal extension is identified. Lymphovascular space invasion is identified.\par 6. Internal mammary lymph node, left excision: One lymph node, negative for metastatic carcinoma.\par \par We reviewed and discussed clinical exam and final pathology with both Kristie and her . She is healing well. She understands her final surgical pathology demonstrated IDC, ILC, and DCIS. 2/6 lymph nodes were positive for metastatic carcinoma with extranodal extension. Lymphovascular space invasion was identified. She will follow up with Dr. Narvaez due to final surgical pathology for any further chemotherapy recommendation and hormonal therapy. She is also to undergo CT chest in June 2019. She understands and agrees to plan. All questions answered. \par

## 2019-11-02 NOTE — PHYSICAL EXAM
[Normocephalic] : normocephalic [Asymmetrical] : asymmetrical [No dominant masses] : no dominant masses in right breast  [No dominant masses] : no dominant masses left breast [de-identified] : Pleasant [de-identified] : Right chest port noted, b/l breasts periareolar incisions are well healed with no erythema no drainage. No masses palpated

## 2019-11-02 NOTE — ASSESSMENT
[FreeTextEntry1] : 59 yo postmenopausal female s/p bilateral mastectomy with left SLNB and SUNNI 3/27/2019. Final surgical pathology demonstrated IDC, ILC, and DCIS. 2/6 lymph nodes positive for macrometastasis. Extranodal extension and lymphovascular space invasion was identified on final surgical pathology. She is doing well post op and is continuing letrozole as per Dr Narvaez\par - Follow up in 4 months for clinical breast exam\par - Follow up with Dr Greene tomorrow for back lipoma\par - Follow up with Dr Narvaez in 3 months\par - Follow up with Dr Hopson to discuss reconstruction\par - Brain MRI for new onset headaches\par - LUE ultrasound to rule out lipoma

## 2019-11-25 ENCOUNTER — OUTPATIENT (OUTPATIENT)
Dept: OUTPATIENT SERVICES | Facility: HOSPITAL | Age: 59
LOS: 1 days | Discharge: HOME | End: 2019-11-25

## 2019-11-25 DIAGNOSIS — C50.919 MALIGNANT NEOPLASM OF UNSPECIFIED SITE OF UNSPECIFIED FEMALE BREAST: ICD-10-CM

## 2019-11-25 DIAGNOSIS — K82.9 DISEASE OF GALLBLADDER, UNSPECIFIED: Chronic | ICD-10-CM

## 2019-11-25 DIAGNOSIS — Z98.890 OTHER SPECIFIED POSTPROCEDURAL STATES: Chronic | ICD-10-CM

## 2019-11-26 ENCOUNTER — APPOINTMENT (OUTPATIENT)
Dept: PLASTIC SURGERY | Facility: CLINIC | Age: 59
End: 2019-11-26
Payer: COMMERCIAL

## 2019-11-26 VITALS
BODY MASS INDEX: 36.32 KG/M2 | HEIGHT: 65 IN | RESPIRATION RATE: 16 BRPM | WEIGHT: 218 LBS | DIASTOLIC BLOOD PRESSURE: 95 MMHG | HEART RATE: 93 BPM | SYSTOLIC BLOOD PRESSURE: 139 MMHG | OXYGEN SATURATION: 95 % | TEMPERATURE: 97.5 F

## 2019-11-26 DIAGNOSIS — Z98.890 OTHER SPECIFIED POSTPROCEDURAL STATES: ICD-10-CM

## 2019-11-26 PROCEDURE — XXXXX: CPT

## 2019-11-26 PROCEDURE — 99214 OFFICE O/P EST MOD 30 MIN: CPT

## 2019-11-29 ENCOUNTER — APPOINTMENT (OUTPATIENT)
Dept: HEMATOLOGY ONCOLOGY | Facility: CLINIC | Age: 59
End: 2019-11-29
Payer: COMMERCIAL

## 2019-11-29 VITALS
HEART RATE: 100 BPM | DIASTOLIC BLOOD PRESSURE: 93 MMHG | TEMPERATURE: 98 F | SYSTOLIC BLOOD PRESSURE: 160 MMHG | WEIGHT: 220.02 LBS | OXYGEN SATURATION: 98 % | BODY MASS INDEX: 36.66 KG/M2 | HEIGHT: 65 IN

## 2019-11-29 PROCEDURE — 99214 OFFICE O/P EST MOD 30 MIN: CPT

## 2019-11-29 RX ORDER — GABAPENTIN 400 MG/1
400 CAPSULE ORAL
Qty: 120 | Refills: 2 | Status: DISCONTINUED | COMMUNITY
Start: 2019-02-01 | End: 2019-11-29

## 2019-11-29 NOTE — PHYSICAL EXAM
[Restricted in physically strenuous activity but ambulatory and able to carry out work of a light or sedentary nature] : Status 1- Restricted in physically strenuous activity but ambulatory and able to carry out work of a light or sedentary nature, e.g., light house work, office work [Obese] : obese [Normal] : full range of motion and no deformities appreciated [de-identified] : negative cervical [de-identified] : S1/S2 [de-identified] : clear [de-identified] : no edema [de-identified] : BS+, soft, n.   [de-identified] : bilateral reconstructed breasts with well healed incisions. [de-identified] : without spinal or cva tenderness.

## 2019-11-29 NOTE — ASSESSMENT
[FreeTextEntry1] : 58 y/o postmenopausal female with locally advanced poorly differentiated left breast IDC,  ER>90%, UT>90%, HER2 negative.  Breast MRI with multicentric disease in left breast, largest abnormal enhancement measures 6.7 cm and extends towards 2.4 cm retroareolar mass.  Completed NACT with DD AC followed by Taxol on 2/8/19.\par S/p bilateral mastectomy with SUNNI reconstruction on 3/27/19. She had residual ypT1c ypN1 disease (2/7 LN positive with extracapsular extension + LVI).  There were no clinically abnormal nodes on presurgery MRIs or exam. Started PMRT given residual kevin disease post NACT on 5/29/19 which was interrupted due to shingles in RT field.  She completed RT in August 2019.   Started Letrozole ~ 4/26/19.\par \par Doing reasonably well on Letrozole but has significant QOL limiting fatigue, which maybe related to letrozole vs. ?sleep apnea\par \par Plan:\par - Hold Letrozole for a month, will call to inform me how she is doing\par -port study due to neck pain\par -F/u in 6 weeks \par \par

## 2019-11-29 NOTE — ADDENDUM
[FreeTextEntry1] : I, Chinmay Carbone, acted solely as a scribe for Dr. Olimpia Narvaez on 11/29/19.

## 2019-11-29 NOTE — HISTORY OF PRESENT ILLNESS
[T: ___] : T[unfilled] [Disease: _____________________] : Disease: [unfilled] [N: ___] : N[unfilled] [AJCC Stage: ____] : AJCC Stage: [unfilled] [de-identified] : Ms. KRISTIE GARZON, is a postmenopausal female who was diagnosed with poorly differentiated invasive ductal carcinoma of left breast cancer, ER >90% MT>90%, HER2 negative.   \par \par At initial presentation, she noted L nipple inversion early August 2018 and was sent for a diagnostic mammo and sonogram by her PCP. In addition, she also had noted a left breast mass ~ 8 months ago, and began to notice more pain at the site. She has not gone for screening mammo in a few years. In the last year, she has lost both her parents, and w/ other family issues, neglected the left breast mass. \par \par Mammogram on 8/27/18 demonstrated a 3.0 cm spiculated mass in the left breast retroareolar region overlying skin retraction and nipple inversion, highly suspicious for malignancy.  US breast showed left breast retroareolar 2.5 x 2 x 3.5 cm irregular hypoechoic solid shadowing mass with ill defined borders corresponding with spiculated mass demonstrated mammographically with morphologic features highly suspicious for malignancy as well as 1.3 x 0.5 x 1.0 cm cystic cluster at 3:00 7cmfn. \par \par She saw Dr. Riggs on 8/28/18 and US guided core biopsy was performed on 8/30/18.  \par \par 8/30/18 Pathology\par 1. Left breast, retroareolar, ultrasound guided core biopsy - poorly differentiated IDC with micropapillary features. ER 90%, MT 95%, HER2 negative.\par - Steven score 8/9 (3 + 3 + 2) in this limited material.\par  - Invasive tumor measures at least 1.3 cm\par   2. Left breast, 3:00, 6 cm fn, - poorly differentiated IDC  with  micropapillary features. ER>90% MT>90%, HER2 negative\par - [place] score 8/9 \par \par 9/14/18 MRI breast - LEFT BREAST: *  An irregularly shaped enhancing mass in the left retroareolar region   is compatible with a site of biopsy-proven malignancy. It measures  approximately 2.4 x 2.1 x 2.2 centimeters (AP by TV by CC). There is  involvement of the nipple, with retraction. Susceptibility artifact from  the biopsy marker is noted in the center of the mass. *  In the 3:00 axis, middle third, there is an irregularly shaped  enhancing mass with surrounding non mass enhancement. This is compatible  with the additional site of biopsy-proven malignancy. Overall, the  abnormal enhancement measures 6.7 x 2.8 x 2.1 centimeters (AP x TV x CC).  The AP extent of the associated non mass enhancement measures  significantly larger than depicted on ultrasound, and extends towards the  site of malignancy in the retroareolar region.\par \par \par Family hx: maternal GM breast cancer (early 50's), maternal 1st cousin (late 50's), 1 maternal aunts ovarian, another maternal aunt w/ 2 unknown cancers. Father w/ prostate cancer. 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 No illicit drug use.\par \par PMH: Hashimoto's (55 y/o), was on thyroid medication in the past, no longer, HTN, arthritis, pre-diabetic, reflux (takes an occasional Zantac, and ventral hernia. Claustrophobia w/ MRI, situational anxiety/depression.\par \par Past sx hx: polyp removal (was told benign), cholecystomy, L knee sx. \par \par Health Care Providers:\par PCP: Dr. Rashard Posada ([place])\par \par Treatment summary:\par 9/28/18 - 11/9/18  dose dense AC x 4 cycles\par 11/23/18 - 2/8/19 weekly Taxol 80 mg/m2 x 12 weeks [de-identified] : ER>90% SC>90%, HER2 negative [de-identified] : poorly differentiated IDC [de-identified] : post neoadjuvant DDAC - ypT1c ypN1a = IIA [de-identified] : Patient returns for follow up. Began Letrozole on 4/26/19.\par Completed RT in 8/2019. \par States she has been feeling extremely fatigued. She does not feel rested in the mornings and has night awakenings. \par She has been staying active. \par Reports bone and joint aches. \par C/o pain where her port is placed. \par \par MRI 10/3/19: \par -Moderate periventricular and bifrontal and biparietal deep and subcortical foci of white matter ischemia. Mild atrophy.\par \par CT Chest 8/1/19:\par -Stable bilateral pulmonary nodules as above. No new nodules.

## 2019-12-03 NOTE — PHYSICAL EXAM
[NI] : Normal [de-identified] : b/l breasts flaps well healed, soft \par left breast larger then right \par incisions well healed  [de-identified] : abdomen soft and non tender \par well healed transverse incision

## 2019-12-03 NOTE — ASSESSMENT
[FreeTextEntry1] : 60 yo female s/p b/l breast reconstruction with SUNNI flap \par Discussed today with Dr. Hopson, pt would like to have b/l breast reduction with lift, liposuction, b/l excision of dog ears from abdominal incision \par

## 2019-12-03 NOTE — HISTORY OF PRESENT ILLNESS
[FreeTextEntry1] : 58 yo female s/p b/l mastectomy history of left breast CA s/p b/l breast reconstruction with SUNNI flap\par pt presented today to discuss revision surgery

## 2019-12-10 ENCOUNTER — APPOINTMENT (OUTPATIENT)
Dept: INTERVENTIONAL RADIOLOGY/VASCULAR | Facility: CLINIC | Age: 59
End: 2019-12-10

## 2019-12-11 ENCOUNTER — CHART COPY (OUTPATIENT)
Age: 59
End: 2019-12-11

## 2019-12-16 ENCOUNTER — FORM ENCOUNTER (OUTPATIENT)
Age: 59
End: 2019-12-16

## 2019-12-17 ENCOUNTER — APPOINTMENT (OUTPATIENT)
Dept: INTERVENTIONAL RADIOLOGY/VASCULAR | Facility: CLINIC | Age: 59
End: 2019-12-17
Payer: COMMERCIAL

## 2019-12-17 ENCOUNTER — OUTPATIENT (OUTPATIENT)
Dept: OUTPATIENT SERVICES | Facility: HOSPITAL | Age: 59
LOS: 1 days | End: 2019-12-17

## 2019-12-17 DIAGNOSIS — C50.912 MALIGNANT NEOPLASM OF UNSPECIFIED SITE OF LEFT FEMALE BREAST: ICD-10-CM

## 2019-12-17 DIAGNOSIS — K82.9 DISEASE OF GALLBLADDER, UNSPECIFIED: Chronic | ICD-10-CM

## 2019-12-17 DIAGNOSIS — Z98.890 OTHER SPECIFIED POSTPROCEDURAL STATES: Chronic | ICD-10-CM

## 2019-12-17 PROCEDURE — 36598 INJ W/FLUOR EVAL CV DEVICE: CPT

## 2020-01-02 ENCOUNTER — OUTPATIENT (OUTPATIENT)
Dept: OUTPATIENT SERVICES | Facility: HOSPITAL | Age: 60
LOS: 1 days | Discharge: HOME | End: 2020-01-02

## 2020-01-02 DIAGNOSIS — K82.9 DISEASE OF GALLBLADDER, UNSPECIFIED: Chronic | ICD-10-CM

## 2020-01-02 DIAGNOSIS — C50.919 MALIGNANT NEOPLASM OF UNSPECIFIED SITE OF UNSPECIFIED FEMALE BREAST: ICD-10-CM

## 2020-01-02 DIAGNOSIS — Z98.890 OTHER SPECIFIED POSTPROCEDURAL STATES: Chronic | ICD-10-CM

## 2020-01-07 ENCOUNTER — RESULT REVIEW (OUTPATIENT)
Age: 60
End: 2020-01-07

## 2020-01-07 ENCOUNTER — APPOINTMENT (OUTPATIENT)
Age: 60
End: 2020-01-07

## 2020-01-07 ENCOUNTER — APPOINTMENT (OUTPATIENT)
Dept: HEMATOLOGY ONCOLOGY | Facility: CLINIC | Age: 60
End: 2020-01-07
Payer: COMMERCIAL

## 2020-01-07 VITALS
WEIGHT: 220 LBS | BODY MASS INDEX: 36.65 KG/M2 | TEMPERATURE: 98.8 F | HEIGHT: 65 IN | SYSTOLIC BLOOD PRESSURE: 166 MMHG | HEART RATE: 116 BPM | DIASTOLIC BLOOD PRESSURE: 91 MMHG | OXYGEN SATURATION: 95 %

## 2020-01-07 LAB
BASOPHILS # BLD AUTO: 0.1 K/UL — SIGNIFICANT CHANGE UP (ref 0–0.2)
BASOPHILS NFR BLD AUTO: 1.1 % — SIGNIFICANT CHANGE UP (ref 0–2)
EOSINOPHIL # BLD AUTO: 0.2 K/UL — SIGNIFICANT CHANGE UP (ref 0–0.5)
EOSINOPHIL NFR BLD AUTO: 3.7 % — SIGNIFICANT CHANGE UP (ref 0–6)
HCT VFR BLD CALC: 48.3 % — HIGH (ref 34.5–45)
HGB BLD-MCNC: 16.3 G/DL — HIGH (ref 11.5–15.5)
LYMPHOCYTES # BLD AUTO: 1.1 K/UL — SIGNIFICANT CHANGE UP (ref 1–3.3)
LYMPHOCYTES # BLD AUTO: 18 % — SIGNIFICANT CHANGE UP (ref 13–44)
MCHC RBC-ENTMCNC: 31.4 PG — SIGNIFICANT CHANGE UP (ref 27–34)
MCHC RBC-ENTMCNC: 33.8 G/DL — SIGNIFICANT CHANGE UP (ref 32–36)
MCV RBC AUTO: 92.8 FL — SIGNIFICANT CHANGE UP (ref 80–100)
MONOCYTES # BLD AUTO: 0.4 K/UL — SIGNIFICANT CHANGE UP (ref 0–0.9)
MONOCYTES NFR BLD AUTO: 6.8 % — SIGNIFICANT CHANGE UP (ref 2–14)
NEUTROPHILS # BLD AUTO: 4.2 K/UL — SIGNIFICANT CHANGE UP (ref 1.8–7.4)
NEUTROPHILS NFR BLD AUTO: 70.4 % — SIGNIFICANT CHANGE UP (ref 43–77)
PLATELET # BLD AUTO: 233 K/UL — SIGNIFICANT CHANGE UP (ref 150–400)
RBC # BLD: 5.21 M/UL — HIGH (ref 3.8–5.2)
RBC # FLD: 12 % — SIGNIFICANT CHANGE UP (ref 10.3–14.5)
WBC # BLD: 6 K/UL — SIGNIFICANT CHANGE UP (ref 3.8–10.5)
WBC # FLD AUTO: 6 K/UL — SIGNIFICANT CHANGE UP (ref 3.8–10.5)

## 2020-01-07 PROCEDURE — 99214 OFFICE O/P EST MOD 30 MIN: CPT

## 2020-01-07 NOTE — HISTORY OF PRESENT ILLNESS
[Disease: _____________________] : Disease: [unfilled] [T: ___] : T[unfilled] [N: ___] : N[unfilled] [AJCC Stage: ____] : AJCC Stage: [unfilled] [de-identified] : Ms. KRISTIE GARZON, is a postmenopausal female who was diagnosed with poorly differentiated invasive ductal carcinoma of left breast cancer, ER >90% NV>90%, HER2 negative.   \par \par At initial presentation, she noted L nipple inversion early August 2018 and was sent for a diagnostic mammo and sonogram by her PCP. In addition, she also had noted a left breast mass ~ 8 months ago, and began to notice more pain at the site. She has not gone for screening mammo in a few years. In the last year, she has lost both her parents, and w/ other family issues, neglected the left breast mass. \par \par Mammogram on 8/27/18 demonstrated a 3.0 cm spiculated mass in the left breast retroareolar region overlying skin retraction and nipple inversion, highly suspicious for malignancy.  US breast showed left breast retroareolar 2.5 x 2 x 3.5 cm irregular hypoechoic solid shadowing mass with ill defined borders corresponding with spiculated mass demonstrated mammographically with morphologic features highly suspicious for malignancy as well as 1.3 x 0.5 x 1.0 cm cystic cluster at 3:00 7cmfn. \par \par She saw Dr. Riggs on 8/28/18 and US guided core biopsy was performed on 8/30/18.  \par \par 8/30/18 Pathology\par 1. Left breast, retroareolar, ultrasound guided core biopsy - poorly differentiated IDC with micropapillary features. ER 90%, NV 95%, HER2 negative.\par - Steven score 8/9 (3 + 3 + 2) in this limited material.\par  - Invasive tumor measures at least 1.3 cm\par   2. Left breast, 3:00, 6 cm fn, - poorly differentiated IDC  with  micropapillary features. ER>90% NV>90%, HER2 negative\par - [place] score 8/9 \par \par 9/14/18 MRI breast - LEFT BREAST: *  An irregularly shaped enhancing mass in the left retroareolar region   is compatible with a site of biopsy-proven malignancy. It measures  approximately 2.4 x 2.1 x 2.2 centimeters (AP by TV by CC). There is  involvement of the nipple, with retraction. Susceptibility artifact from  the biopsy marker is noted in the center of the mass. *  In the 3:00 axis, middle third, there is an irregularly shaped  enhancing mass with surrounding non mass enhancement. This is compatible  with the additional site of biopsy-proven malignancy. Overall, the  abnormal enhancement measures 6.7 x 2.8 x 2.1 centimeters (AP x TV x CC).  The AP extent of the associated non mass enhancement measures  significantly larger than depicted on ultrasound, and extends towards the  site of malignancy in the retroareolar region.\par \par \par Family hx: maternal GM breast cancer (early 50's), maternal 1st cousin (late 50's), 1 maternal aunts ovarian, another maternal aunt w/ 2 unknown cancers. Father w/ prostate cancer. 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 No illicit drug use.\par \par PMH: Hashimoto's (55 y/o), was on thyroid medication in the past, no longer, HTN, arthritis, pre-diabetic, reflux (takes an occasional Zantac, and ventral hernia. Claustrophobia w/ MRI, situational anxiety/depression.\par \par Past sx hx: polyp removal (was told benign), cholecystomy, L knee sx. \par \par Health Care Providers:\par PCP: Dr. Rashard Posada ([place])\par \par Treatment summary:\par 9/28/18 - 11/9/18  dose dense AC x 4 cycles\par 11/23/18 - 2/8/19 weekly Taxol 80 mg/m2 x 12 weeks [de-identified] : poorly differentiated IDC [de-identified] : ER>90% DC>90%, HER2 negative [de-identified] : post neoadjuvant DDAC - ypT1c ypN1a = IIA [de-identified] : Patient returns for f/u. She has been off letrozole since the last visit (11/29/19). \par Notes that her hot flashes have gotten dramatically worse since being off Letrozole. She has about 10-15 episodes a day. \par Still has trouble sleeping and can fall asleep mid conversation.\par Her bone and joint aches have gotten a little better. Neuropathy has also gotten better. \par She's starting to have tremors in the mornings, feels like she is shaking inside, but can also visibly see hands shaking. Sometimes it lasts throughout the entire day. \par States she has been seeing a lot of floaters in R over the past week. \par \par MRI 10/3/19: \par -Moderate periventricular and bifrontal and biparietal deep and subcortical foci of white matter ischemia. Mild atrophy.\par \par CT Chest 8/1/19:\par -Stable bilateral pulmonary nodules as above. No new nodules.

## 2020-01-07 NOTE — PHYSICAL EXAM
[Restricted in physically strenuous activity but ambulatory and able to carry out work of a light or sedentary nature] : Status 1- Restricted in physically strenuous activity but ambulatory and able to carry out work of a light or sedentary nature, e.g., light house work, office work [Obese] : obese [Normal] : full range of motion and no deformities appreciated [de-identified] : negative cervical [de-identified] : clear [de-identified] : S1/S2 [de-identified] : no edema [de-identified] : soft, obese [de-identified] : without spinal or cva tenderness.

## 2020-01-07 NOTE — REVIEW OF SYSTEMS
[Fatigue] : fatigue [Joint Pain] : joint pain [Joint Stiffness] : joint stiffness [Negative] : Heme/Lymph [FreeTextEntry3] : R eye floaters [de-identified] : tremors

## 2020-01-07 NOTE — ASSESSMENT
[FreeTextEntry1] : 58 y/o postmenopausal female with locally advanced poorly differentiated left breast IDC,  ER>90%, GA>90%, HER2 negative.  Breast MRI with multicentric disease in left breast, largest abnormal enhancement measures 6.7 cm and extends towards 2.4 cm retroareolar mass.  Completed NACT with DD AC followed by Taxol on 2/8/19.\par S/p bilateral mastectomy with SUNNI reconstruction on 3/27/19. She had residual ypT1c ypN1 disease (2/7 LN positive with extracapsular extension + LVI).  There were no clinically abnormal nodes on presurgery MRIs or exam. Started PMRT given residual kevin disease post NACT on 5/29/19 which was interrupted due to shingles in RT field.  She completed RT in August 2019.   Started Letrozole ~ 4/26/19.\par \par Letrozole holiday has minimally improved her joint pains.  Continues to have fatigue and day time sleepiness. \par Tremors in hands, MRI brain with evidence of moderate white matter ischemia. \par Floaters in R eye. \par \par Plan:\par -Restart Letrozole, take OTC aleeve BID for pain\par -Neurology referral for tremors / brain MRI findings\par -Follow with ophthalmologist for floaters. \par -Advised to get a sleep study done given day time sleepiness / r/o CARLY\par -F/u in 3 months.\par \par \par

## 2020-01-16 ENCOUNTER — APPOINTMENT (OUTPATIENT)
Dept: NEUROLOGY | Facility: CLINIC | Age: 60
End: 2020-01-16
Payer: COMMERCIAL

## 2020-01-16 VITALS
SYSTOLIC BLOOD PRESSURE: 134 MMHG | WEIGHT: 220 LBS | DIASTOLIC BLOOD PRESSURE: 98 MMHG | HEIGHT: 65 IN | BODY MASS INDEX: 36.65 KG/M2

## 2020-01-16 DIAGNOSIS — R25.1 TREMOR, UNSPECIFIED: ICD-10-CM

## 2020-01-16 DIAGNOSIS — R90.89 OTHER ABNORMAL FINDINGS ON DIAGNOSTIC IMAGING OF CENTRAL NERVOUS SYSTEM: ICD-10-CM

## 2020-01-16 DIAGNOSIS — G44.89 OTHER HEADACHE SYNDROME: ICD-10-CM

## 2020-01-16 DIAGNOSIS — Z72.89 OTHER PROBLEMS RELATED TO LIFESTYLE: ICD-10-CM

## 2020-01-16 PROCEDURE — 99204 OFFICE O/P NEW MOD 45 MIN: CPT

## 2020-01-16 RX ORDER — DIAZEPAM 5 MG/1
5 TABLET ORAL TWICE DAILY
Qty: 28 | Refills: 0 | Status: COMPLETED | COMMUNITY
Start: 2018-09-07 | End: 2020-01-16

## 2020-01-16 RX ORDER — ASCORBIC ACID 500 MG
100 TABLET ORAL
Qty: 90 | Refills: 3 | Status: COMPLETED | COMMUNITY
Start: 2018-12-03 | End: 2020-01-16

## 2020-01-16 NOTE — DATA REVIEWED
[de-identified] : Brain MRI was performed on 10/3/19.\par The study demonstrated mild atrophy and a moderate degree of chronic ischemic change.

## 2020-01-16 NOTE — HISTORY OF PRESENT ILLNESS
[FreeTextEntry1] : I saw this patient in the office today.\par \par She describes a long history of headaches for many years.\par In 2018 this began to increase.\par They have become almost daily.\par They wax and wane in intensity.\par A few times per week they're very severe.\par She denies associated nausea photophobia, although she does report nausea frequently independent of headache.\par \par She also reports tremors which became noticeable in 2018.\par This is almost daily.\par It waxes and wanes in intensity.\par \par

## 2020-01-16 NOTE — ASSESSMENT
[FreeTextEntry1] : This is a 59-year-old woman with tremors for the past few years.\par She is been under treatment for breast cancer.\par \par I have recommended she have her thyroid checked.\par She reports that she has an appointment with an endocrinologist for this.\par I would like to obtain a dopamine spectroscopy scan to assess the tremors further.\par If the study is normal I would consider a trial of Mysoline.\par \par She also has chronic headaches.\par She is currently satisfied with over-the-counter analgesics.\par I do not see a need for further intervention.\par \par I will see her back in a few months once we have the results of the dopamine spectroscopy scan.

## 2020-01-16 NOTE — PHYSICAL EXAM
[General Appearance - In No Acute Distress] : in no acute distress [General Appearance - Alert] : alert [Oriented To Time, Place, And Person] : oriented to person, place, and time [Affect] : the affect was normal [Memory Recent] : recent memory was not impaired [Memory Remote] : remote memory was not impaired [Cranial Nerves Oculomotor (III)] : extraocular motion intact [Cranial Nerves Optic (II)] : visual acuity intact bilaterally,  visual fields full to confrontation, pupils equal round and reactive to light [Cranial Nerves Trigeminal (V)] : facial sensation intact symmetrically [Cranial Nerves Vestibulocochlear (VIII)] : hearing was intact bilaterally [Cranial Nerves Facial (VII)] : face symmetrical [Cranial Nerves Glossopharyngeal (IX)] : tongue and palate midline [Cranial Nerves Accessory (XI - Cranial And Spinal)] : head turning and shoulder shrug symmetric [Cranial Nerves Hypoglossal (XII)] : there was no tongue deviation with protrusion [Motor Tone] : muscle tone was normal in all four extremities [Motor Strength] : muscle strength was normal in all four extremities [Sensation Tactile Decrease] : light touch was intact [Sensation Pain / Temperature Decrease] : pain and temperature was intact [Romberg's Sign] : a positive Romberg's sign was present [Abnormal Walk] : normal gait [Limited Balance] : the patient's balance was impaired [Tremor] : a tremor present [1+] : Patella left 1+ [0] : Ankle jerk left 0 [Optic Disc Abnormality] : the optic disc were normal in size and color [Involuntary Movements] : no involuntary movements were seen [Edema] : there was no peripheral edema [Coordination - Dysmetria Impaired Finger-to-Nose Bilateral] : not present [Aphasia] : no dysphasia/aphasia [Dysarthria] : no dysarthria [Plantar Reflex Right Only] : normal on the right [Plantar Reflex Left Only] : normal on the left

## 2020-01-16 NOTE — CONSULT LETTER
[Dear  ___] : Dear  [unfilled], [Please see my note below.] : Please see my note below. [Courtesy Letter:] : I had the pleasure of seeing your patient, [unfilled], in my office today. [Consult Closing:] : Thank you very much for allowing me to participate in the care of this patient.  If you have any questions, please do not hesitate to contact me. [Sincerely,] : Sincerely, [FreeTextEntry3] : Eugenio Batista MD.

## 2020-01-16 NOTE — REVIEW OF SYSTEMS
[Feeling Tired] : feeling tired [As Noted in HPI] : as noted in HPI [Anxiety] : anxiety [Depression] : depression [Eye Pain] : eye pain [Red Eyes] : red eyes [Tinnitus] : tinnitus [Constipation] : constipation [Diarrhea] : diarrhea [Palpitations] : palpitations [Heartburn] : heartburn [Joint Pain] : joint pain [Swollen Glands] : swollen glands [Hot Flashes] : hot flashes [Negative] : Integumentary

## 2020-01-21 ENCOUNTER — APPOINTMENT (OUTPATIENT)
Dept: SURGERY | Facility: CLINIC | Age: 60
End: 2020-01-21
Payer: COMMERCIAL

## 2020-01-21 VITALS
HEIGHT: 65 IN | SYSTOLIC BLOOD PRESSURE: 132 MMHG | TEMPERATURE: 98.2 F | WEIGHT: 218.03 LBS | OXYGEN SATURATION: 96 % | BODY MASS INDEX: 36.33 KG/M2 | HEART RATE: 97 BPM | DIASTOLIC BLOOD PRESSURE: 84 MMHG

## 2020-01-21 DIAGNOSIS — Z90.13 ACQUIRED ABSENCE OF BILATERAL BREASTS AND NIPPLES: ICD-10-CM

## 2020-01-21 PROCEDURE — 99214 OFFICE O/P EST MOD 30 MIN: CPT

## 2020-01-23 PROBLEM — Z90.13 S/P BILATERAL MASTECTOMY: Status: ACTIVE | Noted: 2019-04-24

## 2020-01-23 NOTE — ASSESSMENT
[FreeTextEntry1] : 57 yo postmenopausal female s/p bilateral mastectomy with left SLNB and SUNNI 3/27/2019. Final surgical pathology demonstrated IDC, ILC, and DCIS. 2/6 lymph nodes positive for macrometastasis. Extranodal extension and lymphovascular space invasion was identified on final surgical pathology. She is doing well post op and is continuing letrozole as per Dr Narvaez\par - Follow up in 4 months for clinical breast exam\par - Follow up with Dr Greene for procedure date of removal of lipomas\par - Follow up with Dr Narvaez \par - Follow up with Dr Hopson to discuss reconstruction (breast lift)\par - Follow up with nuerologist

## 2020-01-23 NOTE — PHYSICAL EXAM
[Normocephalic] : normocephalic [Asymmetrical] : asymmetrical [No dominant masses] : no dominant masses in right breast  [No dominant masses] : no dominant masses left breast [de-identified] : Right chest port noted, b/l breasts periareolar incisions are well healed with no erythema no drainage. No masses palpated [de-identified] : Pleasant

## 2020-01-23 NOTE — HISTORY OF PRESENT ILLNESS
[FreeTextEntry1] : I had the pleasure of seeing KRISTIE GARZON in the office today for a follow up exam secondary to diagnosed and treated left breast cancer. \par \par She underwent bilateral mastectomy with left SLNB and USNNI flap 3/27/2019. She underwent radiation which she completed on 8/6/2019. Final pathology demonstrated IDC, ILC, and DCIS. 2/6 lymph nodes were positive for metastatic carcinoma with extranodal extension. Lymphovascular space invasion was identified.\par \par She underwent neoadjuvant chemotherapy and completed chemotherapy on 2/5/2019.\par PMRT completed 8/6/2019\par Hormonal therapy:  Letrozole\par \par G0. Menses began at age 11.\par She denies personal history of malignancy.\par Family history is significant for maternal grandmother diagnosed with breast cancer at age 55, 2 maternal aunts diagnosed with breast cancer and maternal cousin diagnosed with breast cancer and father diagnosed with prostate cancer at age 74.\par \par Imaging: [facility] \par 8/27/2018 MAMMO DIAG KADEN BILAT \par Impression: New 3 cm irregular spiculated mass in the left retroareolar region with overlying skin retraction and nipple inversion with corresponding solid shadowing mass on sonography with morphologic features highly suspicious for malignancy. BIRADS 5 highly suggestive for malignancy. Recommendation Biopsy. \par 8/27/2018 Breast ultrasound left complete\par Impression: 3.5 cm irregular hypoechoic solid shadowing mass corresponding with the spiculated mass demonstrated mammographically in the left retroareolar region in the area of palpable concern with morphologic features highly suspicious for malignancy. BIRADS 5 highly suggestive malignancy\par \par 9/14/2018 MR breast wawic bi w cad \par Impression: Multicentric biopsy proven malignancy in the left breast. The abnormal non mass enhancement associated with the biopsy proven malignancy in the left 3:00 axis measures significantly larger in AP dimension than depicted on the ultrasound, and extends towards the site of malignancy in the retroareolar region. No MRI evidence of malignancy in the right breast. No suspicious lymphadenopathy. BIRADS 6 Known biopsy proven malignancy.\par \par 3/27/2019 Pathology: \par (Adendum diagnosis: The axillary contents were grossly re examined on 4/4/2019. No definitive tissue was seen grossly. Six additional cassettes were submitted. A single negative microscopic lymph nodes are positive for metastatic carcinoma.\par 1. Breast, right, simple mastectomy: Negative for malignancy. Atypical lobular hyperplasia. Columnar cell change. Cyst formation. Microcalcifications.\par 2. Lymph node, right internal mammary, excision: One beign lymph node.\par 3. Breast left simple mastectomy: Residual infiltrating ductal carcinoma with micropapillary features at both prior biopsy sites. Steven score for both sites: 3 for tubule formation, 3 for nuclear pleomorphism and 1 for mitotic activity (7/9). the residual carcinoma located in the retroareolar region measures 1.8 cm. The residual carcinoma located in the lower outer quadrant measures 1.1 cm. An incidental minute focus of infiltrating lobular carcinoma, measuring 1.2 mm, is identified in the lower outer quadrant. Ductal carcinoma in situ, high grade with apocrine features and ductal carcinoma in situ, solid and micropapillary type, intermedia grade with necrosis. Foci suspicious for lymphovascular space invasion. PErineural invasion is present. Previous biopsy site x2/ Microcalcifications in benign tissue.\par 4. [place] lymph node, left axilla, excision: Benign adipose tissue. No lymph node identified. \par 5. Axillary contents, left, excision: 2/6 lymph nodes are positive for metastatic carcinoma. The largest lymph node metastasis measures 0.4 cm. Extranodal extension is identified. Lymphovascular space invasion is identified.\par 6. Internal mammary lymph node, left excision: One lymph node, negative for metastatic carcinoma.\par \par Patient was examined, and she reports she is tired but it is due to the letrozole.  At our last appointment she reported headache and was sent for MRI of the brain.  Brain MR was benign and she saw Dr. Batista( neurology) who is testing her for Parkinson disease and also told her the brain MR was benign.  She met with Dr. Greene who will be scheduling her for the removal of 3 lipomas ( 1 on left arm and 2 on her back).  Recommendation to continue with hormonal therapy and follow up in 4 months.  She understands and agrees to plan.  All questions answered.\par

## 2020-01-29 ENCOUNTER — APPOINTMENT (OUTPATIENT)
Dept: RADIATION ONCOLOGY | Facility: CLINIC | Age: 60
End: 2020-01-29

## 2020-02-10 ENCOUNTER — RX RENEWAL (OUTPATIENT)
Age: 60
End: 2020-02-10

## 2020-04-03 ENCOUNTER — OUTPATIENT (OUTPATIENT)
Dept: OUTPATIENT SERVICES | Facility: HOSPITAL | Age: 60
LOS: 1 days | Discharge: HOME | End: 2020-04-03

## 2020-04-03 DIAGNOSIS — Z98.890 OTHER SPECIFIED POSTPROCEDURAL STATES: Chronic | ICD-10-CM

## 2020-04-03 DIAGNOSIS — K82.9 DISEASE OF GALLBLADDER, UNSPECIFIED: Chronic | ICD-10-CM

## 2020-04-03 DIAGNOSIS — C50.919 MALIGNANT NEOPLASM OF UNSPECIFIED SITE OF UNSPECIFIED FEMALE BREAST: ICD-10-CM

## 2020-04-08 ENCOUNTER — APPOINTMENT (OUTPATIENT)
Dept: NEUROLOGY | Facility: CLINIC | Age: 60
End: 2020-04-08

## 2020-04-09 ENCOUNTER — APPOINTMENT (OUTPATIENT)
Dept: HEMATOLOGY ONCOLOGY | Facility: CLINIC | Age: 60
End: 2020-04-09

## 2020-05-18 ENCOUNTER — APPOINTMENT (OUTPATIENT)
Dept: SURGERY | Facility: CLINIC | Age: 60
End: 2020-05-18

## 2020-05-20 ENCOUNTER — TRANSCRIPTION ENCOUNTER (OUTPATIENT)
Age: 60
End: 2020-05-20

## 2020-06-16 RX ORDER — SULFAMETHOXAZOLE AND TRIMETHOPRIM 800; 160 MG/1; MG/1
800-160 TABLET ORAL TWICE DAILY
Qty: 6 | Refills: 0 | Status: DISCONTINUED | COMMUNITY
Start: 2020-02-25 | End: 2020-06-16

## 2020-06-18 ENCOUNTER — OUTPATIENT (OUTPATIENT)
Dept: OUTPATIENT SERVICES | Facility: HOSPITAL | Age: 60
LOS: 1 days | Discharge: HOME | End: 2020-06-18

## 2020-06-18 DIAGNOSIS — Z98.890 OTHER SPECIFIED POSTPROCEDURAL STATES: Chronic | ICD-10-CM

## 2020-06-18 DIAGNOSIS — K82.9 DISEASE OF GALLBLADDER, UNSPECIFIED: Chronic | ICD-10-CM

## 2020-06-18 DIAGNOSIS — C50.919 MALIGNANT NEOPLASM OF UNSPECIFIED SITE OF UNSPECIFIED FEMALE BREAST: ICD-10-CM

## 2020-06-19 ENCOUNTER — APPOINTMENT (OUTPATIENT)
Dept: HEMATOLOGY ONCOLOGY | Facility: CLINIC | Age: 60
End: 2020-06-19
Payer: COMMERCIAL

## 2020-06-19 ENCOUNTER — RESULT REVIEW (OUTPATIENT)
Age: 60
End: 2020-06-19

## 2020-06-19 ENCOUNTER — APPOINTMENT (OUTPATIENT)
Age: 60
End: 2020-06-19

## 2020-06-19 ENCOUNTER — LABORATORY RESULT (OUTPATIENT)
Age: 60
End: 2020-06-19

## 2020-06-19 VITALS
HEART RATE: 87 BPM | OXYGEN SATURATION: 94 % | DIASTOLIC BLOOD PRESSURE: 85 MMHG | HEIGHT: 65 IN | WEIGHT: 223.13 LBS | BODY MASS INDEX: 37.18 KG/M2 | SYSTOLIC BLOOD PRESSURE: 140 MMHG

## 2020-06-19 DIAGNOSIS — Z95.828 PRESENCE OF OTHER VASCULAR IMPLANTS AND GRAFTS: ICD-10-CM

## 2020-06-19 DIAGNOSIS — R82.90 UNSPECIFIED ABNORMAL FINDINGS IN URINE: ICD-10-CM

## 2020-06-19 DIAGNOSIS — Z01.812 ENCOUNTER FOR PREPROCEDURAL LABORATORY EXAMINATION: ICD-10-CM

## 2020-06-19 LAB
BASOPHILS # BLD AUTO: 0.1 K/UL — SIGNIFICANT CHANGE UP (ref 0–0.2)
BASOPHILS NFR BLD AUTO: 1.1 % — SIGNIFICANT CHANGE UP (ref 0–2)
EOSINOPHIL # BLD AUTO: 0.2 K/UL — SIGNIFICANT CHANGE UP (ref 0–0.5)
EOSINOPHIL NFR BLD AUTO: 4 % — SIGNIFICANT CHANGE UP (ref 0–6)
HCT VFR BLD CALC: 43.2 % — SIGNIFICANT CHANGE UP (ref 34.5–45)
HGB BLD-MCNC: 14.5 G/DL — SIGNIFICANT CHANGE UP (ref 11.5–15.5)
LYMPHOCYTES # BLD AUTO: 1.3 K/UL — SIGNIFICANT CHANGE UP (ref 1–3.3)
LYMPHOCYTES # BLD AUTO: 26.8 % — SIGNIFICANT CHANGE UP (ref 13–44)
MCHC RBC-ENTMCNC: 31.3 PG — SIGNIFICANT CHANGE UP (ref 27–34)
MCHC RBC-ENTMCNC: 33.4 G/DL — SIGNIFICANT CHANGE UP (ref 32–36)
MCV RBC AUTO: 93.6 FL — SIGNIFICANT CHANGE UP (ref 80–100)
MONOCYTES # BLD AUTO: 0.5 K/UL — SIGNIFICANT CHANGE UP (ref 0–0.9)
MONOCYTES NFR BLD AUTO: 10.3 % — SIGNIFICANT CHANGE UP (ref 2–14)
NEUTROPHILS # BLD AUTO: 2.8 K/UL — SIGNIFICANT CHANGE UP (ref 1.8–7.4)
NEUTROPHILS NFR BLD AUTO: 57.8 % — SIGNIFICANT CHANGE UP (ref 43–77)
PLATELET # BLD AUTO: 230 K/UL — SIGNIFICANT CHANGE UP (ref 150–400)
RBC # BLD: 4.62 M/UL — SIGNIFICANT CHANGE UP (ref 3.8–5.2)
RBC # FLD: 12.7 % — SIGNIFICANT CHANGE UP (ref 10.3–14.5)
WBC # BLD: 4.8 K/UL — SIGNIFICANT CHANGE UP (ref 3.8–10.5)
WBC # FLD AUTO: 4.8 K/UL — SIGNIFICANT CHANGE UP (ref 3.8–10.5)

## 2020-06-19 PROCEDURE — 99214 OFFICE O/P EST MOD 30 MIN: CPT

## 2020-06-19 RX ORDER — TRAMADOL HYDROCHLORIDE 50 MG/1
50 TABLET, COATED ORAL
Qty: 30 | Refills: 0 | Status: DISCONTINUED | COMMUNITY
Start: 2019-06-24 | End: 2020-06-19

## 2020-06-19 RX ORDER — TRAMADOL HYDROCHLORIDE 50 MG/1
50 TABLET, COATED ORAL EVERY 6 HOURS
Qty: 15 | Refills: 0 | Status: DISCONTINUED | COMMUNITY
Start: 2020-02-25 | End: 2020-06-19

## 2020-06-19 NOTE — PHYSICAL EXAM
[Restricted in physically strenuous activity but ambulatory and able to carry out work of a light or sedentary nature] : Status 1- Restricted in physically strenuous activity but ambulatory and able to carry out work of a light or sedentary nature, e.g., light house work, office work [Obese] : obese [Normal] : normal appearance, no rash, nodules, vesicles, ulcers, erythema [de-identified] : xerostomia [de-identified] : negative cervical, supra/infraclavicular adenopathy. [de-identified] : CTA [de-identified] : S1/S2 [de-identified] : negative pedal edema or calve tenderness [de-identified] : bilateral mastectomy with left SLNB and SUNNI flap, no masses palpated bilateral, negative axillary adenopathy. [de-identified] : BS+, soft, nontender on palpation. [de-identified] : without spinal or cva tenderness.

## 2020-06-19 NOTE — HISTORY OF PRESENT ILLNESS
[Disease: _____________________] : Disease: [unfilled] [T: ___] : T[unfilled] [N: ___] : N[unfilled] [AJCC Stage: ____] : AJCC Stage: [unfilled] [de-identified] : Ms. 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   \par \par At initial presentation, she noted L nipple inversion early August 2018 and was sent for a diagnostic mammo and sonogram by her PCP. In addition, she also had noted a left breast mass ~ 8 months ago, and began to notice more pain at the site. She has not gone for screening mammo in a few years. In the last year, she has lost both her parents, and w/ other family issues, neglected the left breast mass. \par \par Mammogram on 8/27/18 demonstrated a 3.0 cm spiculated mass in the left breast retroareolar region overlying skin retraction and nipple inversion, highly suspicious for malignancy.  US breast showed left breast retroareolar 2.5 x 2 x 3.5 cm irregular hypoechoic solid shadowing mass with ill defined borders corresponding with spiculated mass demonstrated mammographically with morphologic features highly suspicious for malignancy as well as 1.3 x 0.5 x 1.0 cm cystic cluster at 3:00 7cmfn. \par \par She saw Dr. Riggs on 8/28/18 and US guided core biopsy was performed on 8/30/18.  \par \par 8/30/18 Pathology\par 1. Left breast, retroareolar, ultrasound guided core biopsy - poorly differentiated IDC with micropapillary features. ER 90%, RI 95%, HER2 negative.\par - Steven score 8/9 (3 + 3 + 2) in this limited material.\par  - Invasive tumor measures at least 1.3 cm\par   2. Left breast, 3:00, 6 cm fn, - poorly differentiated IDC  with  micropapillary features. ER>90% RI>90%, HER2 negative\par - [place] score 8/9 \par \par 9/14/18 MRI breast - LEFT BREAST: *  An irregularly shaped enhancing mass in the left retroareolar region   is compatible with a site of biopsy-proven malignancy. It measures  approximately 2.4 x 2.1 x 2.2 centimeters (AP by TV by CC). There is  involvement of the nipple, with retraction. Susceptibility artifact from  the biopsy marker is noted in the center of the mass. *  In the 3:00 axis, middle third, there is an irregularly shaped  enhancing mass with surrounding non mass enhancement. This is compatible  with the additional site of biopsy-proven malignancy. Overall, the  abnormal enhancement measures 6.7 x 2.8 x 2.1 centimeters (AP x TV x CC).  The AP extent of the associated non mass enhancement measures  significantly larger than depicted on ultrasound, and extends towards the  site of malignancy in the retroareolar region.\par \par \par Family hx: maternal GM breast cancer (early 50's), maternal 1st cousin (late 50's), 1 maternal aunts ovarian, another maternal aunt w/ 2 unknown cancers. Father w/ prostate cancer. 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 No illicit drug use.\par \par PMH: Hashimoto's (53 y/o), was on thyroid medication in the past, no longer, HTN, arthritis, pre-diabetic, reflux (takes an occasional Zantac, and ventral hernia. Claustrophobia w/ MRI, situational anxiety/depression.\par \par Past sx hx: polyp removal (was told benign), cholecystomy, L knee sx. \par \par Health Care Providers:\par PCP: Dr. Rashard Posada ([place])\par \par Treatment summary:\par 9/28/18 - 11/9/18  dose dense AC x 4 cycles\par 11/23/18 - 2/8/19 weekly Taxol 80 mg/m2 x 12 weeks [de-identified] : poorly differentiated IDC [de-identified] : ER>90% OR>90%, HER2 negative [de-identified] : post neoadjuvant DDAC - ypT1c ypN1a = IIA [de-identified] : Since last seen, she has remained on letrozole, she c/o worsened aches/pains, takes Advil usually 1-2 x's a day, she is no longer on Tramadol. She has a hard time sleeping at night both to arthralgias and hot flashes. Feels more depressed w/ anxiety attacks, she has not seen her PCP, Dr. Gilmore, in a "long time". Denies fevers, no SOB, no CP, appetite is good, variant constipation and diarrhea, tries to manage w/ diet and MiraLAX, no pain/burning w/ urination, but for the past couple of months, states there is a mal-odor to urine, she does not know why, does not feel like she has a UTI. No LE edema. Energy level is poor. More forgetful, states has to "grab for words". She has not seen Dr. Batista "in awhile". The remainder of ROS is negative.\par

## 2020-06-19 NOTE — ASSESSMENT
[FreeTextEntry1] : 58 y/o postmenopausal female with locally advanced poorly differentiated left breast IDC,  ER>90%, SD>90%, HER2 negative.  Breast MRI with multicentric disease in left breast, largest abnormal enhancement measures 6.7 cm and extends towards 2.4 cm retroareolar mass.  Completed NACT with DD AC followed by Taxol on 2/8/19.\par S/p bilateral mastectomy with SUNNI reconstruction on 3/27/19. She had residual ypT1c ypN1 disease (2/7 LN positive with extracapsular extension + LVI).  There were no clinically abnormal nodes on presurgery MRIs or exam. Started PMRT given residual kevin disease post NACT on 5/29/19 which was interrupted due to shingles in RT field.  She completed RT in August 2019.   Started Letrozole ~ 4/26/19.\par \par Remains on Letrozole, w/ persistent arthralgias, + hot flashes. Wants to continue w/ Letrozole for the benefit of decreasing risk of recurrence. Reports increase in forgetfulness, and word finding, she has not f/u w/ Dr. Batista \par since January '20. MRI brain with evidence of moderate white matter ischemia. Desires port removal.\par \par Plan:\par -Continue Letrozole, continue Aleve BID for pain. Increase activity.\par -Hot flashes: rx Venlafaxine 37.5 mg (she is no longer on Tramadol). Call Opal in 1 week to see how you tolerate. Instructed for any mood changes to stop and call. She states she understands.\par -F/u w/ Dr. Batista for increase forget fullness brain MRI findings\par -Still sees floater, saw opthalmology, does not recall what the evaluation was, knows she needs to return in f/u. \par -F/u w/ PCP to discuss having sleep study done r/o CARLY, also to address anxiety.\par -Malodor to urine: send U/A. \par -Send for mediport removal (labs ordered).\par -F/u w/ Dr. Narvaez in 3 months. in 3 months.

## 2020-06-22 LAB
APPEARANCE: CLEAR
APTT BLD: 31.3 SEC
BASOPHILS # BLD AUTO: 0.06 K/UL
BASOPHILS NFR BLD AUTO: 1.1 %
BILIRUBIN URINE: NEGATIVE
BLOOD URINE: NEGATIVE
COLOR: YELLOW
EOSINOPHIL # BLD AUTO: 0.18 K/UL
EOSINOPHIL NFR BLD AUTO: 3.4 %
GLUCOSE QUALITATIVE U: NEGATIVE
HCT VFR BLD CALC: 48.3 %
HGB BLD-MCNC: 15.4 G/DL
IMM GRANULOCYTES NFR BLD AUTO: 0.2 %
INR PPP: 1.01 RATIO
KETONES URINE: NORMAL
LEUKOCYTE ESTERASE URINE: NEGATIVE
LYMPHOCYTES # BLD AUTO: 1.66 K/UL
LYMPHOCYTES NFR BLD AUTO: 31 %
MAN DIFF?: NORMAL
MCHC RBC-ENTMCNC: 31.2 PG
MCHC RBC-ENTMCNC: 31.9 GM/DL
MCV RBC AUTO: 98 FL
MONOCYTES # BLD AUTO: 0.48 K/UL
MONOCYTES NFR BLD AUTO: 9 %
NEUTROPHILS # BLD AUTO: 2.97 K/UL
NEUTROPHILS NFR BLD AUTO: 55.3 %
NITRITE URINE: NEGATIVE
PH URINE: 5
PLATELET # BLD AUTO: 259 K/UL
PROTEIN URINE: NEGATIVE
PT BLD: 11.4 SEC
RBC # BLD: 4.93 M/UL
RBC # FLD: 13.4 %
SPECIFIC GRAVITY URINE: 1.03
UROBILINOGEN URINE: NORMAL
WBC # FLD AUTO: 5.36 K/UL

## 2020-07-11 DIAGNOSIS — Z01.818 ENCOUNTER FOR OTHER PREPROCEDURAL EXAMINATION: ICD-10-CM

## 2020-07-12 ENCOUNTER — APPOINTMENT (OUTPATIENT)
Dept: DISASTER EMERGENCY | Facility: CLINIC | Age: 60
End: 2020-07-12

## 2020-07-12 LAB — SARS-COV-2 N GENE NPH QL NAA+PROBE: NOT DETECTED

## 2020-07-15 ENCOUNTER — RESULT REVIEW (OUTPATIENT)
Age: 60
End: 2020-07-15

## 2020-07-15 ENCOUNTER — OUTPATIENT (OUTPATIENT)
Dept: OUTPATIENT SERVICES | Facility: HOSPITAL | Age: 60
LOS: 1 days | End: 2020-07-15

## 2020-07-15 ENCOUNTER — APPOINTMENT (OUTPATIENT)
Dept: INTERVENTIONAL RADIOLOGY/VASCULAR | Facility: CLINIC | Age: 60
End: 2020-07-15
Payer: COMMERCIAL

## 2020-07-15 DIAGNOSIS — Z98.890 OTHER SPECIFIED POSTPROCEDURAL STATES: Chronic | ICD-10-CM

## 2020-07-15 DIAGNOSIS — K82.9 DISEASE OF GALLBLADDER, UNSPECIFIED: Chronic | ICD-10-CM

## 2020-07-15 DIAGNOSIS — Z00.8 ENCOUNTER FOR OTHER GENERAL EXAMINATION: ICD-10-CM

## 2020-07-15 PROCEDURE — 77001 FLUOROGUIDE FOR VEIN DEVICE: CPT | Mod: 26

## 2020-07-15 PROCEDURE — 36590 REMOVAL TUNNELED CV CATH: CPT

## 2020-07-31 NOTE — H&P PST ADULT - MAMMOGRAM, LAST, PROFILE
July 31, 2020      Annette Pate, ISAIAS  [address]  [facility] [address]           [place]  [address] [place] [address] [place] [address]  [facility] [address]  Phone: [phone]  Fax: [phone]          Patient: Fan Zimmerman   MR Number: [id number]   YOB: 1957   Date of Visit: 7/31/2020       Dear Annette Pate:    Thank you for referring Fan Zimmerman to me for evaluation. Attached you will find relevant portions of my assessment and plan of care.    If you have questions, please do not hesitate to call me. I look forward to following Fan Zimmerman along with you.    Sincerely,    Gia Gr, [facility]    Enclosure  CC:  No Recipients    If you would like to receive this communication electronically, please contact [email or web address] or [phone] to request more information on [facility] access.    For providers and/or their staff who would like to refer a patient to [facility], please contact us through our one-stop-shop provider referral line, [facility], at [phone].    If you feel you have received this communication in error or would no longer like to receive these types of communications, please e-mail [email or web address]         
up to date

## 2020-08-21 RX ORDER — VENLAFAXINE 37.5 MG/1
37.5 TABLET ORAL DAILY
Qty: 30 | Refills: 1 | Status: DISCONTINUED | COMMUNITY
Start: 2020-06-19 | End: 2020-08-21

## 2020-12-03 ENCOUNTER — TRANSCRIPTION ENCOUNTER (OUTPATIENT)
Age: 60
End: 2020-12-03

## 2020-12-18 ENCOUNTER — OUTPATIENT (OUTPATIENT)
Dept: OUTPATIENT SERVICES | Facility: HOSPITAL | Age: 60
LOS: 1 days | Discharge: HOME | End: 2020-12-18

## 2020-12-18 DIAGNOSIS — C50.912 MALIGNANT NEOPLASM OF UNSPECIFIED SITE OF LEFT FEMALE BREAST: ICD-10-CM

## 2020-12-18 DIAGNOSIS — Z98.890 OTHER SPECIFIED POSTPROCEDURAL STATES: Chronic | ICD-10-CM

## 2020-12-18 DIAGNOSIS — K82.9 DISEASE OF GALLBLADDER, UNSPECIFIED: Chronic | ICD-10-CM

## 2020-12-21 ENCOUNTER — APPOINTMENT (OUTPATIENT)
Dept: HEMATOLOGY ONCOLOGY | Facility: CLINIC | Age: 60
End: 2020-12-21
Payer: COMMERCIAL

## 2020-12-21 ENCOUNTER — RESULT REVIEW (OUTPATIENT)
Age: 60
End: 2020-12-21

## 2020-12-21 VITALS
HEART RATE: 130 BPM | SYSTOLIC BLOOD PRESSURE: 129 MMHG | WEIGHT: 224.03 LBS | OXYGEN SATURATION: 97 % | HEIGHT: 65 IN | BODY MASS INDEX: 37.33 KG/M2 | DIASTOLIC BLOOD PRESSURE: 81 MMHG

## 2020-12-21 LAB
BASOPHILS # BLD AUTO: 0 K/UL — SIGNIFICANT CHANGE UP (ref 0–0.2)
BASOPHILS NFR BLD AUTO: 0.9 % — SIGNIFICANT CHANGE UP (ref 0–2)
EOSINOPHIL # BLD AUTO: 0.1 K/UL — SIGNIFICANT CHANGE UP (ref 0–0.5)
EOSINOPHIL NFR BLD AUTO: 2.2 % — SIGNIFICANT CHANGE UP (ref 0–6)
HCT VFR BLD CALC: 51.7 % — HIGH (ref 34.5–45)
HGB BLD-MCNC: 16.7 G/DL — HIGH (ref 11.5–15.5)
LYMPHOCYTES # BLD AUTO: 1.7 K/UL — SIGNIFICANT CHANGE UP (ref 1–3.3)
LYMPHOCYTES # BLD AUTO: 30.5 % — SIGNIFICANT CHANGE UP (ref 13–44)
MCHC RBC-ENTMCNC: 31.2 PG — SIGNIFICANT CHANGE UP (ref 27–34)
MCHC RBC-ENTMCNC: 32.3 G/DL — SIGNIFICANT CHANGE UP (ref 32–36)
MCV RBC AUTO: 96.6 FL — SIGNIFICANT CHANGE UP (ref 80–100)
MONOCYTES # BLD AUTO: 0.4 K/UL — SIGNIFICANT CHANGE UP (ref 0–0.9)
MONOCYTES NFR BLD AUTO: 6.8 % — SIGNIFICANT CHANGE UP (ref 2–14)
NEUTROPHILS # BLD AUTO: 3.4 K/UL — SIGNIFICANT CHANGE UP (ref 1.8–7.4)
NEUTROPHILS NFR BLD AUTO: 59.6 % — SIGNIFICANT CHANGE UP (ref 43–77)
PLATELET # BLD AUTO: 228 K/UL — SIGNIFICANT CHANGE UP (ref 150–400)
RBC # BLD: 5.35 M/UL — HIGH (ref 3.8–5.2)
RBC # FLD: 12.2 % — SIGNIFICANT CHANGE UP (ref 10.3–14.5)
WBC # BLD: 5.7 K/UL — SIGNIFICANT CHANGE UP (ref 3.8–10.5)
WBC # FLD AUTO: 5.7 K/UL — SIGNIFICANT CHANGE UP (ref 3.8–10.5)

## 2020-12-21 PROCEDURE — 99072 ADDL SUPL MATRL&STAF TM PHE: CPT

## 2020-12-21 PROCEDURE — 99214 OFFICE O/P EST MOD 30 MIN: CPT

## 2020-12-21 RX ORDER — SAW/PYGEUM/BETA/HERB/D3/B6/ZN 30 MG-25MG
200 CAPSULE ORAL
Qty: 30 | Refills: 3 | Status: DISCONTINUED | COMMUNITY
Start: 2018-12-03 | End: 2020-12-21

## 2020-12-21 NOTE — HISTORY OF PRESENT ILLNESS
[Disease: _____________________] : Disease: [unfilled] [T: ___] : T[unfilled] [N: ___] : N[unfilled] [AJCC Stage: ____] : AJCC Stage: [unfilled] [de-identified] : Ms. KRISTIE GARZON, is a postmenopausal female who was diagnosed with poorly differentiated invasive ductal carcinoma of left breast cancer, ER >90% UT>90%, HER2 negative.   \par \par At initial presentation, she noted L nipple inversion early August 2018 and was sent for a diagnostic mammo and sonogram by her PCP. In addition, she also had noted a left breast mass ~ 8 months ago, and began to notice more pain at the site. She has not gone for screening mammo in a few years. In the last year, she has lost both her parents, and w/ other family issues, neglected the left breast mass. \par \par Mammogram on 8/27/18 demonstrated a 3.0 cm spiculated mass in the left breast retroareolar region overlying skin retraction and nipple inversion, highly suspicious for malignancy.  US breast showed left breast retroareolar 2.5 x 2 x 3.5 cm irregular hypoechoic solid shadowing mass with ill defined borders corresponding with spiculated mass demonstrated mammographically with morphologic features highly suspicious for malignancy as well as 1.3 x 0.5 x 1.0 cm cystic cluster at 3:00 7cmfn. \par \par She saw Dr. Riggs on 8/28/18 and US guided core biopsy was performed on 8/30/18.  \par \par 8/30/18 Pathology\par 1. Left breast, retroareolar, ultrasound guided core biopsy - poorly differentiated IDC with micropapillary features. ER 90%, UT 95%, HER2 negative.\par - Steven score 8/9 (3 + 3 + 2) in this limited material.\par  - Invasive tumor measures at least 1.3 cm\par   2. Left breast, 3:00, 6 cm fn, - poorly differentiated IDC  with  micropapillary features. ER>90% UT>90%, HER2 negative\par - [place] score 8/9 \par \par 9/14/18 MRI breast - LEFT BREAST: *  An irregularly shaped enhancing mass in the left retroareolar region   is compatible with a site of biopsy-proven malignancy. It measures  approximately 2.4 x 2.1 x 2.2 centimeters (AP by TV by CC). There is  involvement of the nipple, with retraction. Susceptibility artifact from  the biopsy marker is noted in the center of the mass. *  In the 3:00 axis, middle third, there is an irregularly shaped  enhancing mass with surrounding non mass enhancement. This is compatible  with the additional site of biopsy-proven malignancy. Overall, the  abnormal enhancement measures 6.7 x 2.8 x 2.1 centimeters (AP x TV x CC).  The AP extent of the associated non mass enhancement measures  significantly larger than depicted on ultrasound, and extends towards the  site of malignancy in the retroareolar region.\par \par \par Family hx: maternal GM breast cancer (early 50's), maternal 1st cousin (late 50's), 1 maternal aunts ovarian, another maternal aunt w/ 2 unknown cancers. Father w/ prostate cancer. 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 No illicit drug use.\par \par PMH: Hashimoto's (55 y/o), was on thyroid medication in the past, no longer, HTN, arthritis, pre-diabetic, reflux (takes an occasional Zantac, and ventral hernia. Claustrophobia w/ MRI, situational anxiety/depression.\par \par Past sx hx: polyp removal (was told benign), cholecystomy, L knee sx. \par \par Health Care Providers:\par PCP: Dr. Rashard Posada ([place])\par \par Treatment summary:\par 9/28/18 - 11/9/18  dose dense AC x 4 cycles\par 11/23/18 - 2/8/19 weekly Taxol 80 mg/m2 x 12 weeks [de-identified] : poorly differentiated IDC [de-identified] : ER>90% OR>90%, HER2 negative [de-identified] : post neoadjuvant DDAC - ypT1c ypN1a = IIA [de-identified] : Returns for follow up.\par Notes that her bone hurts and has muscle cramps.\par She also feels very foggy in thinking. \par She also notes joint pain. \par Notes that she has tingling in her feet, and notes that she is walking on cardboard. \par Notes hot flashes 4 times per day. \par

## 2020-12-21 NOTE — ASSESSMENT
[FreeTextEntry1] : 59 y/o postmenopausal female with locally advanced poorly differentiated left breast IDC,  ER>90%, HI>90%, HER2 negative.  Breast MRI with multicentric disease in left breast, largest abnormal enhancement measures 6.7 cm and extends towards 2.4 cm retroareolar mass.  Completed NACT with DD AC followed by Taxol on 2/8/19.\par S/p bilateral mastectomy with SUNNI reconstruction on 3/27/19. She had residual ypT1c ypN1 disease (2/7 LN positive with extracapsular extension + LVI).  There were no clinically abnormal nodes on presurgery MRIs or exam. Started PMRT given residual kevin disease post NACT on 5/29/19 which was interrupted due to shingles in RT field.  She completed RT in August 2019.   Started Letrozole ~ 4/26/19.\par \par Continues on Letrozole but multiple s/e include muscle cramps, arthritic pain, thought process is foggy. \par \par Plan:\par -discussed letrozole holiday x 4 weeks, instructed to call here on 1/19/21 to let us know how she is doing.\par If significant improvement in side effects, will rotate to anastrazole.  If no major change in s/e then remain on letrozole\par \par -taper Venlafaxine to every other day and stop in 2 weeks, unclear if it is truly helping with hot flashes. \par -CIPN - continue duloxetine\par -needs dexa scan which was ordered\par -tentative follow up in 5/2021

## 2020-12-21 NOTE — PHYSICAL EXAM
[Restricted in physically strenuous activity but ambulatory and able to carry out work of a light or sedentary nature] : Status 1- Restricted in physically strenuous activity but ambulatory and able to carry out work of a light or sedentary nature, e.g., light house work, office work [Obese] : obese [Normal] : affect appropriate [de-identified] : supple [de-identified] : CTA [de-identified] : S1/S2 [de-identified] : no edema [de-identified] : bilateral reconstructed breasts, no masses palpated bilaterally, negative axillary adenopathy. [de-identified] : BS+, soft, nontender on palpation. [de-identified] : without spinal or cva tenderness.

## 2020-12-24 LAB
ALBUMIN SERPL ELPH-MCNC: 4.4 G/DL
ALP BLD-CCNC: 101 U/L
ALT SERPL-CCNC: 14 U/L
ANION GAP SERPL CALC-SCNC: 17 MMOL/L
AST SERPL-CCNC: 17 U/L
BILIRUB SERPL-MCNC: 0.2 MG/DL
BUN SERPL-MCNC: 16 MG/DL
CALCIUM SERPL-MCNC: 10.1 MG/DL
CHLORIDE SERPL-SCNC: 103 MMOL/L
CO2 SERPL-SCNC: 23 MMOL/L
CREAT SERPL-MCNC: 1.04 MG/DL
GLUCOSE SERPL-MCNC: 52 MG/DL
POTASSIUM SERPL-SCNC: 4.9 MMOL/L
PROT SERPL-MCNC: 6.8 G/DL
SODIUM SERPL-SCNC: 143 MMOL/L

## 2021-05-19 ENCOUNTER — OUTPATIENT (OUTPATIENT)
Dept: OUTPATIENT SERVICES | Facility: HOSPITAL | Age: 61
LOS: 1 days | Discharge: HOME | End: 2021-05-19

## 2021-05-19 DIAGNOSIS — C50.919 MALIGNANT NEOPLASM OF UNSPECIFIED SITE OF UNSPECIFIED FEMALE BREAST: ICD-10-CM

## 2021-05-19 DIAGNOSIS — Z98.890 OTHER SPECIFIED POSTPROCEDURAL STATES: Chronic | ICD-10-CM

## 2021-05-19 DIAGNOSIS — K82.9 DISEASE OF GALLBLADDER, UNSPECIFIED: Chronic | ICD-10-CM

## 2021-05-20 ENCOUNTER — RESULT REVIEW (OUTPATIENT)
Age: 61
End: 2021-05-20

## 2021-05-20 ENCOUNTER — LABORATORY RESULT (OUTPATIENT)
Age: 61
End: 2021-05-20

## 2021-05-20 ENCOUNTER — APPOINTMENT (OUTPATIENT)
Dept: HEMATOLOGY ONCOLOGY | Facility: CLINIC | Age: 61
End: 2021-05-20
Payer: COMMERCIAL

## 2021-05-20 VITALS
OXYGEN SATURATION: 96 % | BODY MASS INDEX: 37.99 KG/M2 | SYSTOLIC BLOOD PRESSURE: 158 MMHG | WEIGHT: 228 LBS | HEART RATE: 125 BPM | HEIGHT: 65 IN | DIASTOLIC BLOOD PRESSURE: 101 MMHG

## 2021-05-20 LAB
BASOPHILS # BLD AUTO: 0 K/UL — SIGNIFICANT CHANGE UP (ref 0–0.2)
BASOPHILS NFR BLD AUTO: 0.8 % — SIGNIFICANT CHANGE UP (ref 0–2)
EOSINOPHIL # BLD AUTO: 0.1 K/UL — SIGNIFICANT CHANGE UP (ref 0–0.5)
EOSINOPHIL NFR BLD AUTO: 2.7 % — SIGNIFICANT CHANGE UP (ref 0–6)
HCT VFR BLD CALC: 52.8 % — HIGH (ref 34.5–45)
HGB BLD-MCNC: 16.9 G/DL — HIGH (ref 11.5–15.5)
LYMPHOCYTES # BLD AUTO: 1.5 K/UL — SIGNIFICANT CHANGE UP (ref 1–3.3)
LYMPHOCYTES # BLD AUTO: 28.4 % — SIGNIFICANT CHANGE UP (ref 13–44)
MCHC RBC-ENTMCNC: 31 PG — SIGNIFICANT CHANGE UP (ref 27–34)
MCHC RBC-ENTMCNC: 32 G/DL — SIGNIFICANT CHANGE UP (ref 32–36)
MCV RBC AUTO: 97 FL — SIGNIFICANT CHANGE UP (ref 80–100)
MONOCYTES # BLD AUTO: 0.4 K/UL — SIGNIFICANT CHANGE UP (ref 0–0.9)
MONOCYTES NFR BLD AUTO: 7.3 % — SIGNIFICANT CHANGE UP (ref 2–14)
NEUTROPHILS # BLD AUTO: 3.3 K/UL — SIGNIFICANT CHANGE UP (ref 1.8–7.4)
NEUTROPHILS NFR BLD AUTO: 60.8 % — SIGNIFICANT CHANGE UP (ref 43–77)
PLATELET # BLD AUTO: 234 K/UL — SIGNIFICANT CHANGE UP (ref 150–400)
RBC # BLD: 5.45 M/UL — HIGH (ref 3.8–5.2)
RBC # FLD: 12.5 % — SIGNIFICANT CHANGE UP (ref 10.3–14.5)
WBC # BLD: 5.4 K/UL — SIGNIFICANT CHANGE UP (ref 3.8–10.5)
WBC # FLD AUTO: 5.4 K/UL — SIGNIFICANT CHANGE UP (ref 3.8–10.5)

## 2021-05-20 PROCEDURE — 99215 OFFICE O/P EST HI 40 MIN: CPT

## 2021-05-20 PROCEDURE — 99072 ADDL SUPL MATRL&STAF TM PHE: CPT

## 2021-05-20 RX ORDER — VENLAFAXINE HYDROCHLORIDE 75 MG/1
75 CAPSULE, EXTENDED RELEASE ORAL DAILY
Qty: 90 | Refills: 1 | Status: DISCONTINUED | COMMUNITY
Start: 2020-08-21 | End: 2021-05-20

## 2021-05-20 RX ORDER — LETROZOLE TABLETS 2.5 MG/1
2.5 TABLET, FILM COATED ORAL DAILY
Qty: 30 | Refills: 6 | Status: DISCONTINUED | COMMUNITY
Start: 2019-04-26 | End: 2021-05-20

## 2021-05-20 RX ORDER — METOPROLOL TARTRATE 25 MG/1
25 TABLET, FILM COATED ORAL DAILY
Refills: 0 | Status: DISCONTINUED | COMMUNITY
Start: 2018-09-13 | End: 2021-05-20

## 2021-05-20 NOTE — PHYSICAL EXAM
[Restricted in physically strenuous activity but ambulatory and able to carry out work of a light or sedentary nature] : Status 1- Restricted in physically strenuous activity but ambulatory and able to carry out work of a light or sedentary nature, e.g., light house work, office work [Obese] : obese [Normal] : affect appropriate [de-identified] : CTA [de-identified] : supple [de-identified] : S1/S2 [de-identified] : no edema [de-identified] : bilateral reconstructed breasts, no masses palpated bilaterally, negative axillary adenopathy.

## 2021-05-20 NOTE — HISTORY OF PRESENT ILLNESS
[Disease: _____________________] : Disease: [unfilled] [T: ___] : T[unfilled] [N: ___] : N[unfilled] [AJCC Stage: ____] : AJCC Stage: [unfilled] [de-identified] : Ms. KRISTIE GARZON, is a postmenopausal female who was diagnosed with poorly differentiated invasive ductal carcinoma of left breast cancer, ER >90% IN>90%, HER2 negative.   \par \par At initial presentation, she noted L nipple inversion early August 2018 and was sent for a diagnostic mammo and sonogram by her PCP. In addition, she also had noted a left breast mass ~ 8 months ago, and began to notice more pain at the site. She has not gone for screening mammo in a few years. In the last year, she has lost both her parents, and w/ other family issues, neglected the left breast mass. \par \par Mammogram on 8/27/18 demonstrated a 3.0 cm spiculated mass in the left breast retroareolar region overlying skin retraction and nipple inversion, highly suspicious for malignancy.  US breast showed left breast retroareolar 2.5 x 2 x 3.5 cm irregular hypoechoic solid shadowing mass with ill defined borders corresponding with spiculated mass demonstrated mammographically with morphologic features highly suspicious for malignancy as well as 1.3 x 0.5 x 1.0 cm cystic cluster at 3:00 7cmfn. \par \par She saw Dr. Riggs on 8/28/18 and US guided core biopsy was performed on 8/30/18.  \par \par 8/30/18 Pathology\par 1. Left breast, retroareolar, ultrasound guided core biopsy - poorly differentiated IDC with micropapillary features. ER 90%, IN 95%, HER2 negative.\par - Steven score 8/9 (3 + 3 + 2) in this limited material.\par  - Invasive tumor measures at least 1.3 cm\par   2. Left breast, 3:00, 6 cm fn, - poorly differentiated IDC  with  micropapillary features. ER>90% IN>90%, HER2 negative\par - [place] score 8/9 \par \par 9/14/18 MRI breast - LEFT BREAST: *  An irregularly shaped enhancing mass in the left retroareolar region   is compatible with a site of biopsy-proven malignancy. It measures  approximately 2.4 x 2.1 x 2.2 centimeters (AP by TV by CC). There is  involvement of the nipple, with retraction. Susceptibility artifact from  the biopsy marker is noted in the center of the mass. *  In the 3:00 axis, middle third, there is an irregularly shaped  enhancing mass with surrounding non mass enhancement. This is compatible  with the additional site of biopsy-proven malignancy. Overall, the  abnormal enhancement measures 6.7 x 2.8 x 2.1 centimeters (AP x TV x CC).  The AP extent of the associated non mass enhancement measures  significantly larger than depicted on ultrasound, and extends towards the  site of malignancy in the retroareolar region.\par \par \par Family hx: maternal GM breast cancer (early 50's), maternal 1st cousin (late 50's), 1 maternal aunts ovarian, another maternal aunt w/ 2 unknown cancers. Father w/ prostate cancer. 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 No illicit drug use.\par \par PMH: Hashimoto's (53 y/o), was on thyroid medication in the past, no longer, HTN, arthritis, pre-diabetic, reflux (takes an occasional Zantac, and ventral hernia. Claustrophobia w/ MRI, situational anxiety/depression.\par \par Past sx hx: polyp removal (was told benign), cholecystomy, L knee sx. \par \par Health Care Providers:\par PCP: Dr. Rashard Posada ([place])\par \par Treatment summary:\par 9/28/18 - 11/9/18  dose dense AC x 4 cycles\par 11/23/18 - 2/8/19 weekly Taxol 80 mg/m2 x 12 weeks [de-identified] : poorly differentiated IDC [de-identified] : ER>90% AR>90%, HER2 negative [de-identified] : post neoadjuvant DDAC - ypT1c ypN1a = IIA [de-identified] : Returns for follow up.\par Last visit 12/21/20 reported severe joint pain,discussed letrozole holiday x 4 weeks \par Stopped Letrozole 12/21/20 but did not call to follow up, and did not resume Letrozole either. \par Reports since stopping Letrozole significant improvement in mood, joint, mild depression/anxiety\par Denies joint pain \par Continued intermittent tingling/neuropathy of bilateral hands and feet, on duloxetine\par Sleep is good, reports snoring at night\par Not fatigue\par Reports intermittent mild pain of left breast\par Continued hot flashes, no longer taking Effexor

## 2021-05-20 NOTE — ASSESSMENT
[FreeTextEntry1] : 61 y/o postmenopausal female with locally advanced poorly differentiated left breast IDC,  ER>90%, WA>90%, HER2 negative.  Breast MRI with multicentric disease in left breast, largest abnormal enhancement measures 6.7 cm and extends towards 2.4 cm retroareolar mass.  Completed NACT with DD AC followed by Taxol on 2/8/19.\par S/p bilateral mastectomy with SUNNI reconstruction on 3/27/19. She had residual ypT1c ypN1 disease (2/7 LN positive with extracapsular extension + LVI).  There were no clinically abnormal nodes on presurgery MRIs or exam. Completed PMRT given residual kevin disease post NACT in  August 2019.   Received Letrozole ~ 4/26/19 - 12/2020 .\par \par Now has been off Letrozole for 5 months, though holiday was only intended for 4-6 weeks. Notes significant improvement of joint pains, fogginess, mood. \par \par Plan:\par -discussed at length the role for endocrine therapy in risk reduction. She has high risk disease. Discussed rotating to anastrazole. \par -Start anastrazole\par -CIPN - continue duloxetine\par -DEXA scan overdue: patient aware to obtain \par -Polycythemia, HCT 52%, likely secondary: advised patient to obtain sleep study test, erythropoietin level added to labs today; JAK2 will be obtained next visit \par -tentative follow up in 3 months

## 2021-05-27 ENCOUNTER — APPOINTMENT (OUTPATIENT)
Dept: RADIOLOGY | Facility: CLINIC | Age: 61
End: 2021-05-27
Payer: COMMERCIAL

## 2021-05-27 ENCOUNTER — OUTPATIENT (OUTPATIENT)
Dept: OUTPATIENT SERVICES | Facility: HOSPITAL | Age: 61
LOS: 1 days | End: 2021-05-27
Payer: COMMERCIAL

## 2021-05-27 DIAGNOSIS — Z00.8 ENCOUNTER FOR OTHER GENERAL EXAMINATION: ICD-10-CM

## 2021-05-27 DIAGNOSIS — K82.9 DISEASE OF GALLBLADDER, UNSPECIFIED: Chronic | ICD-10-CM

## 2021-05-27 DIAGNOSIS — Z98.890 OTHER SPECIFIED POSTPROCEDURAL STATES: Chronic | ICD-10-CM

## 2021-05-27 PROCEDURE — 77080 DXA BONE DENSITY AXIAL: CPT

## 2021-05-27 PROCEDURE — 77080 DXA BONE DENSITY AXIAL: CPT | Mod: 26

## 2021-06-02 LAB
ALBUMIN SERPL ELPH-MCNC: 4.5 G/DL
ALP BLD-CCNC: 104 U/L
ALT SERPL-CCNC: 18 U/L
ANION GAP SERPL CALC-SCNC: 16 MMOL/L
AST SERPL-CCNC: 22 U/L
BILIRUB SERPL-MCNC: 0.3 MG/DL
BUN SERPL-MCNC: 12 MG/DL
CALCIUM SERPL-MCNC: 9.6 MG/DL
CHLORIDE SERPL-SCNC: 104 MMOL/L
CO2 SERPL-SCNC: 22 MMOL/L
CREAT SERPL-MCNC: 0.85 MG/DL
GLUCOSE SERPL-MCNC: 107 MG/DL
POTASSIUM SERPL-SCNC: 5 MMOL/L
PROT SERPL-MCNC: 7.1 G/DL
SODIUM SERPL-SCNC: 142 MMOL/L

## 2021-07-14 ENCOUNTER — TRANSCRIPTION ENCOUNTER (OUTPATIENT)
Age: 61
End: 2021-07-14

## 2021-08-10 ENCOUNTER — OUTPATIENT (OUTPATIENT)
Dept: OUTPATIENT SERVICES | Facility: HOSPITAL | Age: 61
LOS: 1 days | Discharge: HOME | End: 2021-08-10

## 2021-08-10 DIAGNOSIS — C50.919 MALIGNANT NEOPLASM OF UNSPECIFIED SITE OF UNSPECIFIED FEMALE BREAST: ICD-10-CM

## 2021-08-10 DIAGNOSIS — Z98.890 OTHER SPECIFIED POSTPROCEDURAL STATES: Chronic | ICD-10-CM

## 2021-08-10 DIAGNOSIS — K82.9 DISEASE OF GALLBLADDER, UNSPECIFIED: Chronic | ICD-10-CM

## 2021-08-16 ENCOUNTER — APPOINTMENT (OUTPATIENT)
Dept: HEMATOLOGY ONCOLOGY | Facility: CLINIC | Age: 61
End: 2021-08-16

## 2021-11-13 ENCOUNTER — OUTPATIENT (OUTPATIENT)
Dept: OUTPATIENT SERVICES | Facility: HOSPITAL | Age: 61
LOS: 1 days | Discharge: HOME | End: 2021-11-13

## 2021-11-13 DIAGNOSIS — K82.9 DISEASE OF GALLBLADDER, UNSPECIFIED: Chronic | ICD-10-CM

## 2021-11-13 DIAGNOSIS — Z98.890 OTHER SPECIFIED POSTPROCEDURAL STATES: Chronic | ICD-10-CM

## 2021-11-13 DIAGNOSIS — C50.919 MALIGNANT NEOPLASM OF UNSPECIFIED SITE OF UNSPECIFIED FEMALE BREAST: ICD-10-CM

## 2021-11-18 ENCOUNTER — APPOINTMENT (OUTPATIENT)
Dept: HEMATOLOGY ONCOLOGY | Facility: CLINIC | Age: 61
End: 2021-11-18

## 2021-12-13 ENCOUNTER — APPOINTMENT (OUTPATIENT)
Dept: HEMATOLOGY ONCOLOGY | Facility: CLINIC | Age: 61
End: 2021-12-13
Payer: COMMERCIAL

## 2021-12-13 ENCOUNTER — RESULT REVIEW (OUTPATIENT)
Age: 61
End: 2021-12-13

## 2021-12-13 VITALS
HEART RATE: 131 BPM | DIASTOLIC BLOOD PRESSURE: 104 MMHG | SYSTOLIC BLOOD PRESSURE: 152 MMHG | WEIGHT: 232 LBS | OXYGEN SATURATION: 96 % | BODY MASS INDEX: 38.65 KG/M2 | HEIGHT: 65 IN

## 2021-12-13 LAB
BASOPHILS # BLD AUTO: 0 K/UL — SIGNIFICANT CHANGE UP (ref 0–0.2)
BASOPHILS NFR BLD AUTO: 0.8 % — SIGNIFICANT CHANGE UP (ref 0–2)
EOSINOPHIL # BLD AUTO: 0.1 K/UL — SIGNIFICANT CHANGE UP (ref 0–0.5)
EOSINOPHIL NFR BLD AUTO: 2.4 % — SIGNIFICANT CHANGE UP (ref 0–6)
HCT VFR BLD CALC: 48.3 % — HIGH (ref 34.5–45)
HGB BLD-MCNC: 15.6 G/DL — HIGH (ref 11.5–15.5)
LYMPHOCYTES # BLD AUTO: 1.4 K/UL — SIGNIFICANT CHANGE UP (ref 1–3.3)
LYMPHOCYTES # BLD AUTO: 24.9 % — SIGNIFICANT CHANGE UP (ref 13–44)
MCHC RBC-ENTMCNC: 30.9 PG — SIGNIFICANT CHANGE UP (ref 27–34)
MCHC RBC-ENTMCNC: 32.3 G/DL — SIGNIFICANT CHANGE UP (ref 32–36)
MCV RBC AUTO: 95.4 FL — SIGNIFICANT CHANGE UP (ref 80–100)
MONOCYTES # BLD AUTO: 0.3 K/UL — SIGNIFICANT CHANGE UP (ref 0–0.9)
MONOCYTES NFR BLD AUTO: 6.1 % — SIGNIFICANT CHANGE UP (ref 2–14)
NEUTROPHILS # BLD AUTO: 3.6 K/UL — SIGNIFICANT CHANGE UP (ref 1.8–7.4)
NEUTROPHILS NFR BLD AUTO: 65.8 % — SIGNIFICANT CHANGE UP (ref 43–77)
PLATELET # BLD AUTO: 225 K/UL — SIGNIFICANT CHANGE UP (ref 150–400)
RBC # BLD: 5.06 M/UL — SIGNIFICANT CHANGE UP (ref 3.8–5.2)
RBC # FLD: 11.8 % — SIGNIFICANT CHANGE UP (ref 10.3–14.5)
WBC # BLD: 5.5 K/UL — SIGNIFICANT CHANGE UP (ref 3.8–10.5)
WBC # FLD AUTO: 5.5 K/UL — SIGNIFICANT CHANGE UP (ref 3.8–10.5)

## 2021-12-13 PROCEDURE — 99215 OFFICE O/P EST HI 40 MIN: CPT

## 2021-12-13 NOTE — PHYSICAL EXAM
[Restricted in physically strenuous activity but ambulatory and able to carry out work of a light or sedentary nature] : Status 1- Restricted in physically strenuous activity but ambulatory and able to carry out work of a light or sedentary nature, e.g., light house work, office work [Obese] : obese [Normal] : affect appropriate [de-identified] : supple [de-identified] : CTA [de-identified] : S1/S2 [de-identified] : bilateral reconstructed breasts, no masses palpated bilaterally, negative axillary adenopathy. [de-identified] : no edema

## 2021-12-13 NOTE — HISTORY OF PRESENT ILLNESS
[Disease: _____________________] : Disease: [unfilled] [T: ___] : T[unfilled] [N: ___] : N[unfilled] [AJCC Stage: ____] : AJCC Stage: [unfilled] [de-identified] : Ms. KRISTIE GARZON, is a postmenopausal female who was diagnosed with poorly differentiated invasive ductal carcinoma of left breast cancer, ER >90% WA>90%, HER2 negative.   \par \par At initial presentation, she noted L nipple inversion early August 2018 and was sent for a diagnostic mammo and sonogram by her PCP. In addition, she also had noted a left breast mass ~ 8 months ago, and began to notice more pain at the site. She has not gone for screening mammo in a few years. In the last year, she has lost both her parents, and w/ other family issues, neglected the left breast mass. \par \par Mammogram on 8/27/18 demonstrated a 3.0 cm spiculated mass in the left breast retroareolar region overlying skin retraction and nipple inversion, highly suspicious for malignancy.  US breast showed left breast retroareolar 2.5 x 2 x 3.5 cm irregular hypoechoic solid shadowing mass with ill defined borders corresponding with spiculated mass demonstrated mammographically with morphologic features highly suspicious for malignancy as well as 1.3 x 0.5 x 1.0 cm cystic cluster at 3:00 7cmfn. \par \par She saw Dr. Riggs on 8/28/18 and US guided core biopsy was performed on 8/30/18.  \par \par 8/30/18 Pathology\par 1. Left breast, retroareolar, ultrasound guided core biopsy - poorly differentiated IDC with micropapillary features. ER 90%, WA 95%, HER2 negative.\par - Steven score 8/9 (3 + 3 + 2) in this limited material.\par  - Invasive tumor measures at least 1.3 cm\par   2. Left breast, 3:00, 6 cm fn, - poorly differentiated IDC  with  micropapillary features. ER>90% WA>90%, HER2 negative\par - [place] score 8/9 \par \par 9/14/18 MRI breast - LEFT BREAST: *  An irregularly shaped enhancing mass in the left retroareolar region   is compatible with a site of biopsy-proven malignancy. It measures  approximately 2.4 x 2.1 x 2.2 centimeters (AP by TV by CC). There is  involvement of the nipple, with retraction. Susceptibility artifact from  the biopsy marker is noted in the center of the mass. *  In the 3:00 axis, middle third, there is an irregularly shaped  enhancing mass with surrounding non mass enhancement. This is compatible  with the additional site of biopsy-proven malignancy. Overall, the  abnormal enhancement measures 6.7 x 2.8 x 2.1 centimeters (AP x TV x CC).  The AP extent of the associated non mass enhancement measures  significantly larger than depicted on ultrasound, and extends towards the  site of malignancy in the retroareolar region.\par \par \par Family hx: maternal GM breast cancer (early 50's), maternal 1st cousin (late 50's), 1 maternal aunts ovarian, another maternal aunt w/ 2 unknown cancers. Father w/ prostate cancer. 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 No illicit drug use.\par \par PMH: Hashimoto's (55 y/o), was on thyroid medication in the past, no longer, HTN, arthritis, pre-diabetic, reflux (takes an occasional Zantac, and ventral hernia. Claustrophobia w/ MRI, situational anxiety/depression.\par \par Past sx hx: polyp removal (was told benign), cholecystomy, L knee sx. \par \par Health Care Providers:\par PCP: Dr. Rashard Posada ([place])\par \par Treatment summary:\par 9/28/18 - 11/9/18  dose dense AC x 4 cycles\par 11/23/18 - 2/8/19 weekly Taxol 80 mg/m2 x 12 weeks [de-identified] : poorly differentiated IDC [de-identified] : ER>90% KS>90%, HER2 negative [de-identified] : post neoadjuvant DDAC - ypT1c ypN1a = IIA [de-identified] : Returns for follow up visit.\par Started Anastrazole 5/20/21\par Notes since starting Anastrazole experiencing intermittent muscle cramping of feet and back then immediately self resolves\par No joint stiffness\par Has bone pain of bilateral legs, feet and left sided rib pain \par Adequate daily water intake \par Intermittent headaches\par Has intermittent daily drenching sweats at rest and on exertion and intermittent nausea since starting Anastrazole\par Mood is not well, easily cries since running out of Duloxetine 2 weeks ago \par Has not received dental evaluation in the past year

## 2021-12-13 NOTE — ASSESSMENT
[FreeTextEntry1] : 61 y/o postmenopausal female with locally advanced poorly differentiated left breast IDC,  ER>90%, IA>90%, HER2 negative.  Breast MRI with multicentric disease in left breast, largest abnormal enhancement measures 6.7 cm and extends towards 2.4 cm retroareolar mass.  Completed NACT with DD AC followed by Taxol on 2/8/19.\par S/p bilateral mastectomy with SUNNI reconstruction on 3/27/19. She had residual ypT1c ypN1 disease (2/7 LN positive with extracapsular extension + LVI).  There were no clinically abnormal nodes on presurgery MRIs or exam. Completed PMRT given residual kevin disease post NACT in  August 2019.   Received Letrozole ~ 4/26/19 - 12/2020 due to severe joint pain.\par \par Started Anastrazole 5/20/21\par Reviewed 05/27/2021 DEXA - Osteopenia. Discussed Zometa or Prolia once every 6 months. Side effects can include but are not limited to flu like symptoms, fatigue, hypocalcemia and osteonecrosis of jaw. She was advised to get a dental evaluation prior to treatment.\par She has MSK symptoms / cramping due to AI\par \par Plan:\par -Continue Anastrazole for now, intermitting cramping not impacting QOL\par -CIPN - continue Duloxetine, refilled\par -Hot flashes- start Gabapentin 300mg QHS \par -Osteopenia- pending dental evaluation prior to treatment\par -Lung nodules- surveillance CT Chest ordered \par -Polycythemia- HCT 48.3 %, likely secondary: advised to obtain sleep study test, erythropoietin level 5.7 mIU/mL on 5/20/21\par -Follow up in 4 months

## 2021-12-16 LAB
ALBUMIN SERPL ELPH-MCNC: 4 G/DL
ALP BLD-CCNC: 91 U/L
ALT SERPL-CCNC: 18 U/L
ANION GAP SERPL CALC-SCNC: 11 MMOL/L
AST SERPL-CCNC: 19 U/L
BILIRUB SERPL-MCNC: 0.2 MG/DL
BUN SERPL-MCNC: 14 MG/DL
CALCIUM SERPL-MCNC: 9.8 MG/DL
CHLORIDE SERPL-SCNC: 103 MMOL/L
CO2 SERPL-SCNC: 27 MMOL/L
CREAT SERPL-MCNC: 0.86 MG/DL
GLUCOSE SERPL-MCNC: 148 MG/DL
POTASSIUM SERPL-SCNC: 5 MMOL/L
PROT SERPL-MCNC: 6.5 G/DL
SODIUM SERPL-SCNC: 141 MMOL/L

## 2021-12-30 NOTE — DISCHARGE NOTE PROVIDER - HOSPITAL COURSE
The patient underwent bilateral mastectomy and SUNNI breast reconstruction without event.  Post-operative anemia was treated with 2 U of PRBC with good response.  The patient's pain is controlled on POD 3, and she subjectively "feels great" and wishes to go home. <-------- Click here to INCLUDE [facility]-19 Discharge Instructions

## 2022-04-11 ENCOUNTER — OUTPATIENT (OUTPATIENT)
Dept: OUTPATIENT SERVICES | Facility: HOSPITAL | Age: 62
LOS: 1 days | Discharge: HOME | End: 2022-04-11

## 2022-04-11 DIAGNOSIS — Z98.890 OTHER SPECIFIED POSTPROCEDURAL STATES: Chronic | ICD-10-CM

## 2022-04-11 DIAGNOSIS — C50.919 MALIGNANT NEOPLASM OF UNSPECIFIED SITE OF UNSPECIFIED FEMALE BREAST: ICD-10-CM

## 2022-04-11 DIAGNOSIS — K82.9 DISEASE OF GALLBLADDER, UNSPECIFIED: Chronic | ICD-10-CM

## 2022-04-13 ENCOUNTER — APPOINTMENT (OUTPATIENT)
Dept: HEMATOLOGY ONCOLOGY | Facility: CLINIC | Age: 62
End: 2022-04-13
Payer: COMMERCIAL

## 2022-04-13 ENCOUNTER — RESULT REVIEW (OUTPATIENT)
Age: 62
End: 2022-04-13

## 2022-04-13 VITALS
DIASTOLIC BLOOD PRESSURE: 92 MMHG | HEART RATE: 131 BPM | SYSTOLIC BLOOD PRESSURE: 125 MMHG | WEIGHT: 224.38 LBS | HEIGHT: 65 IN | OXYGEN SATURATION: 97 % | BODY MASS INDEX: 37.38 KG/M2

## 2022-04-13 LAB
BASOPHILS # BLD AUTO: 0.1 K/UL — SIGNIFICANT CHANGE UP (ref 0–0.2)
BASOPHILS NFR BLD AUTO: 0.9 % — SIGNIFICANT CHANGE UP (ref 0–2)
EOSINOPHIL # BLD AUTO: 0.1 K/UL — SIGNIFICANT CHANGE UP (ref 0–0.5)
EOSINOPHIL NFR BLD AUTO: 2.5 % — SIGNIFICANT CHANGE UP (ref 0–6)
HCT VFR BLD CALC: 52.6 % — HIGH (ref 34.5–45)
HGB BLD-MCNC: 16.6 G/DL — HIGH (ref 11.5–15.5)
LYMPHOCYTES # BLD AUTO: 1.6 K/UL — SIGNIFICANT CHANGE UP (ref 1–3.3)
LYMPHOCYTES # BLD AUTO: 30.8 % — SIGNIFICANT CHANGE UP (ref 13–44)
MCHC RBC-ENTMCNC: 31.2 PG — SIGNIFICANT CHANGE UP (ref 27–34)
MCHC RBC-ENTMCNC: 31.6 G/DL — LOW (ref 32–36)
MCV RBC AUTO: 98.8 FL — SIGNIFICANT CHANGE UP (ref 80–100)
MONOCYTES # BLD AUTO: 0.4 K/UL — SIGNIFICANT CHANGE UP (ref 0–0.9)
MONOCYTES NFR BLD AUTO: 8.2 % — SIGNIFICANT CHANGE UP (ref 2–14)
NEUTROPHILS # BLD AUTO: 3.1 K/UL — SIGNIFICANT CHANGE UP (ref 1.8–7.4)
NEUTROPHILS NFR BLD AUTO: 57.6 % — SIGNIFICANT CHANGE UP (ref 43–77)
PLATELET # BLD AUTO: 243 K/UL — SIGNIFICANT CHANGE UP (ref 150–400)
RBC # BLD: 5.32 M/UL — HIGH (ref 3.8–5.2)
RBC # FLD: 12.2 % — SIGNIFICANT CHANGE UP (ref 10.3–14.5)
WBC # BLD: 5.3 K/UL — SIGNIFICANT CHANGE UP (ref 3.8–10.5)
WBC # FLD AUTO: 5.3 K/UL — SIGNIFICANT CHANGE UP (ref 3.8–10.5)

## 2022-04-13 PROCEDURE — 99215 OFFICE O/P EST HI 40 MIN: CPT

## 2022-04-13 NOTE — HISTORY OF PRESENT ILLNESS
[Disease: _____________________] : Disease: [unfilled] [T: ___] : T[unfilled] [N: ___] : N[unfilled] [AJCC Stage: ____] : AJCC Stage: [unfilled] [de-identified] : Ms. KRISTIE GARZON, is a postmenopausal female who was diagnosed with poorly differentiated invasive ductal carcinoma of left breast cancer, ER >90% MS>90%, HER2 negative.   \par \par At initial presentation, she noted L nipple inversion early August 2018 and was sent for a diagnostic mammo and sonogram by her PCP. In addition, she also had noted a left breast mass ~ 8 months ago, and began to notice more pain at the site. She has not gone for screening mammo in a few years. In the last year, she has lost both her parents, and w/ other family issues, neglected the left breast mass. \par \par Mammogram on 8/27/18 demonstrated a 3.0 cm spiculated mass in the left breast retroareolar region overlying skin retraction and nipple inversion, highly suspicious for malignancy.  US breast showed left breast retroareolar 2.5 x 2 x 3.5 cm irregular hypoechoic solid shadowing mass with ill defined borders corresponding with spiculated mass demonstrated mammographically with morphologic features highly suspicious for malignancy as well as 1.3 x 0.5 x 1.0 cm cystic cluster at 3:00 7cmfn. \par \par She saw Dr. Riggs on 8/28/18 and US guided core biopsy was performed on 8/30/18.  \par \par 8/30/18 Pathology\par 1. Left breast, retroareolar, ultrasound guided core biopsy - poorly differentiated IDC with micropapillary features. ER 90%, MS 95%, HER2 negative.\par - Steven score 8/9 (3 + 3 + 2) in this limited material.\par  - Invasive tumor measures at least 1.3 cm\par   2. Left breast, 3:00, 6 cm fn, - poorly differentiated IDC  with  micropapillary features. ER>90% MS>90%, HER2 negative\par - [place] score 8/9 \par \par 9/14/18 MRI breast - LEFT BREAST: *  An irregularly shaped enhancing mass in the left retroareolar region   is compatible with a site of biopsy-proven malignancy. It measures  approximately 2.4 x 2.1 x 2.2 centimeters (AP by TV by CC). There is  involvement of the nipple, with retraction. Susceptibility artifact from  the biopsy marker is noted in the center of the mass. *  In the 3:00 axis, middle third, there is an irregularly shaped  enhancing mass with surrounding non mass enhancement. This is compatible  with the additional site of biopsy-proven malignancy. Overall, the  abnormal enhancement measures 6.7 x 2.8 x 2.1 centimeters (AP x TV x CC).  The AP extent of the associated non mass enhancement measures  significantly larger than depicted on ultrasound, and extends towards the  site of malignancy in the retroareolar region.\par \par \par Family hx: maternal GM breast cancer (early 50's), maternal 1st cousin (late 50's), 1 maternal aunts ovarian, another maternal aunt w/ 2 unknown cancers. Father w/ prostate cancer. 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 No illicit drug use.\par \par PMH: Hashimoto's (53 y/o), was on thyroid medication in the past, no longer, HTN, arthritis, pre-diabetic, reflux (takes an occasional Zantac, and ventral hernia. Claustrophobia w/ MRI, situational anxiety/depression.\par \par Past sx hx: polyp removal (was told benign), cholecystomy, L knee sx. \par \par Health Care Providers:\par PCP: Dr. Rashard Posada ([place])\par \par Treatment summary:\par 9/28/18 - 11/9/18  dose dense AC x 4 cycles\par 11/23/18 - 2/8/19 weekly Taxol 80 mg/m2 x 12 weeks [de-identified] : poorly differentiated IDC [de-identified] : ER>90% OR>90%, HER2 negative [de-identified] : post neoadjuvant DDAC - ypT1c ypN1a = IIA [de-identified] : Returns for follow up visit.\par Started Anastrazole 5/20/21\par Hot flashes has decreased to 1-2 times a day, has been less intensified, taking Gabapentin \par Notes increase in anxiety \par Improvement in neuropathy of bilateral feet, increase sensation of fingertips, taking Cymbalta \par Does not sleep well throughout entire life, wakes up frequently\par Has history of sleep apnea, no longer uses CPAP \par Not taking vitamin D supplement currently \par Did not obtain dental clearance. Does not wish to pursue treatment for osteopenia \par Has not obtained surveillance CT Chest

## 2022-04-13 NOTE — PHYSICAL EXAM
[Restricted in physically strenuous activity but ambulatory and able to carry out work of a light or sedentary nature] : Status 1- Restricted in physically strenuous activity but ambulatory and able to carry out work of a light or sedentary nature, e.g., light house work, office work [Obese] : obese [Normal] : affect appropriate [de-identified] : supple [de-identified] : no edema [de-identified] : bilateral reconstructed breasts, no masses palpated bilaterally, negative axillary adenopathy.

## 2022-04-13 NOTE — ASSESSMENT
[FreeTextEntry1] : 59 y/o postmenopausal female with locally advanced poorly differentiated left breast IDC,  ER>90%, CO>90%, HER2 negative.  Breast MRI with multicentric disease in left breast, largest abnormal enhancement measures 6.7 cm and extends towards 2.4 cm retroareolar mass.  Completed NACT with DD AC followed by Taxol on 2/8/19.\par S/p bilateral mastectomy with SUNNI reconstruction on 3/27/19. She had residual ypT1c ypN1 disease (2/7 LN positive with extracapsular extension + LVI).  There were no clinically abnormal nodes on presurgery MRIs or exam. Completed PMRT given residual kevin disease post NACT in  August 2019.   Received Letrozole ~ 4/26/19 - 12/2020, drug holiday due to severe joint pain 12/2020- 5/20/2021, though holiday was only intended for 4-6 weeks. \par \par Started Anastrazole 5/20/21, tolerating that much better than anastrazole. \par 05/27/2021 DEXA - Osteopenia. \par Reviewed CBC from today WBC 5.3 K HGB 16.6 g HCT 52.6 %  K\par \par Plan:\par -Continue Anastrazole\par -CIPN/ Anxiety/depression- Increase Duloxetine to 90mg daily \par -Hot flashes- Continue Gabapentin 300mg QHS \par -Osteopenia- defers Prolia/Zometa Start calcium and vitamin D. Vitamin D level ordered.\par -Lung nodules- surveillance CT Chest prev. ordered, 4/23/22\par -Polycythemia- HCT 52.6 %, likely secondary to sleep apnea, noncompliant to CPAP- referral to pulmonary sent. Erythropoietin level 5.7 mIU/mL on 5/20/21.\par -Follow up in 4 months

## 2022-04-15 ENCOUNTER — NON-APPOINTMENT (OUTPATIENT)
Age: 62
End: 2022-04-15

## 2022-04-15 LAB
25(OH)D3 SERPL-MCNC: 24.3 NG/ML
ALBUMIN SERPL ELPH-MCNC: 4.5 G/DL
ALP BLD-CCNC: 97 U/L
ALT SERPL-CCNC: 13 U/L
ANION GAP SERPL CALC-SCNC: 15 MMOL/L
AST SERPL-CCNC: 18 U/L
BILIRUB SERPL-MCNC: 0.4 MG/DL
BUN SERPL-MCNC: 16 MG/DL
CALCIUM SERPL-MCNC: 10 MG/DL
CHLORIDE SERPL-SCNC: 103 MMOL/L
CO2 SERPL-SCNC: 23 MMOL/L
CREAT SERPL-MCNC: 0.88 MG/DL
EGFR: 75 ML/MIN/1.73M2
EPO SERPL-MCNC: 5.1 MIU/ML
GLUCOSE SERPL-MCNC: 67 MG/DL
POTASSIUM SERPL-SCNC: 4.3 MMOL/L
PROT SERPL-MCNC: 7 G/DL
SODIUM SERPL-SCNC: 141 MMOL/L

## 2022-04-19 ENCOUNTER — NON-APPOINTMENT (OUTPATIENT)
Age: 62
End: 2022-04-19

## 2022-04-23 ENCOUNTER — APPOINTMENT (OUTPATIENT)
Dept: CT IMAGING | Facility: CLINIC | Age: 62
End: 2022-04-23

## 2022-05-09 ENCOUNTER — APPOINTMENT (OUTPATIENT)
Dept: PULMONOLOGY | Facility: CLINIC | Age: 62
End: 2022-05-09

## 2022-06-29 ENCOUNTER — TRANSCRIPTION ENCOUNTER (OUTPATIENT)
Age: 62
End: 2022-06-29

## 2022-08-18 ENCOUNTER — OUTPATIENT (OUTPATIENT)
Dept: OUTPATIENT SERVICES | Facility: HOSPITAL | Age: 62
LOS: 1 days | Discharge: HOME | End: 2022-08-18

## 2022-08-18 DIAGNOSIS — Z98.890 OTHER SPECIFIED POSTPROCEDURAL STATES: Chronic | ICD-10-CM

## 2022-08-18 DIAGNOSIS — K82.9 DISEASE OF GALLBLADDER, UNSPECIFIED: Chronic | ICD-10-CM

## 2022-08-18 DIAGNOSIS — C50.919 MALIGNANT NEOPLASM OF UNSPECIFIED SITE OF UNSPECIFIED FEMALE BREAST: ICD-10-CM

## 2022-08-22 ENCOUNTER — APPOINTMENT (OUTPATIENT)
Dept: HEMATOLOGY ONCOLOGY | Facility: CLINIC | Age: 62
End: 2022-08-22

## 2022-08-30 ENCOUNTER — APPOINTMENT (OUTPATIENT)
Dept: HEMATOLOGY ONCOLOGY | Facility: CLINIC | Age: 62
End: 2022-08-30

## 2022-08-30 VITALS
DIASTOLIC BLOOD PRESSURE: 98 MMHG | WEIGHT: 229 LBS | BODY MASS INDEX: 38.15 KG/M2 | OXYGEN SATURATION: 96 % | HEIGHT: 65 IN | HEART RATE: 117 BPM | SYSTOLIC BLOOD PRESSURE: 148 MMHG

## 2022-08-30 DIAGNOSIS — R10.11 RIGHT UPPER QUADRANT PAIN: ICD-10-CM

## 2022-08-30 PROCEDURE — 99214 OFFICE O/P EST MOD 30 MIN: CPT

## 2022-08-30 RX ORDER — LOSARTAN POTASSIUM 25 MG/1
25 TABLET, FILM COATED ORAL
Qty: 30 | Refills: 0 | Status: DISCONTINUED | COMMUNITY
Start: 2021-05-24 | End: 2022-08-30

## 2022-08-30 NOTE — ADDENDUM
[FreeTextEntry1] : Documented by Parvin Winkler acting as scribe for Dr. Narvaez on 08/30/2022.\par \par All Medical record entries made by the Scribe were at my, Dr. Narvaez, direction and personally dictated by me on 08/30/2022. I have reviewed the chart and agree that the record accurately reflects my personal performance of the history, physical exam, assessment and plan. I have also personally directed, reviewed, and agreed with the discharge instructions.

## 2022-08-30 NOTE — HISTORY OF PRESENT ILLNESS
[Disease: _____________________] : Disease: [unfilled] [T: ___] : T[unfilled] [N: ___] : N[unfilled] [AJCC Stage: ____] : AJCC Stage: [unfilled] [de-identified] : Ms. KIRSTIE GARZON, is a postmenopausal female who was diagnosed with poorly differentiated invasive ductal carcinoma of left breast cancer, ER >90% CA>90%, HER2 negative.   \par \par At initial presentation, she noted L nipple inversion early August 2018 and was sent for a diagnostic mammo and sonogram by her PCP. In addition, she also had noted a left breast mass ~ 8 months ago, and began to notice more pain at the site. She has not gone for screening mammo in a few years. In the last year, she has lost both her parents, and w/ other family issues, neglected the left breast mass. \par \par Mammogram on 8/27/18 demonstrated a 3.0 cm spiculated mass in the left breast retroareolar region overlying skin retraction and nipple inversion, highly suspicious for malignancy.  US breast showed left breast retroareolar 2.5 x 2 x 3.5 cm irregular hypoechoic solid shadowing mass with ill defined borders corresponding with spiculated mass demonstrated mammographically with morphologic features highly suspicious for malignancy as well as 1.3 x 0.5 x 1.0 cm cystic cluster at 3:00 7cmfn. \par \par She saw Dr. Riggs on 8/28/18 and US guided core biopsy was performed on 8/30/18.  \par \par 8/30/18 Pathology\par 1. Left breast, retroareolar, ultrasound guided core biopsy - poorly differentiated IDC with micropapillary features. ER 90%, CA 95%, HER2 negative.\par - Steven score 8/9 (3 + 3 + 2) in this limited material.\par  - Invasive tumor measures at least 1.3 cm\par   2. Left breast, 3:00, 6 cm fn, - poorly differentiated IDC  with  micropapillary features. ER>90% CA>90%, HER2 negative\par - [place] score 8/9 \par \par 9/14/18 MRI breast - LEFT BREAST: *  An irregularly shaped enhancing mass in the left retroareolar region   is compatible with a site of biopsy-proven malignancy. It measures  approximately 2.4 x 2.1 x 2.2 centimeters (AP by TV by CC). There is  involvement of the nipple, with retraction. Susceptibility artifact from  the biopsy marker is noted in the center of the mass. *  In the 3:00 axis, middle third, there is an irregularly shaped  enhancing mass with surrounding non mass enhancement. This is compatible  with the additional site of biopsy-proven malignancy. Overall, the  abnormal enhancement measures 6.7 x 2.8 x 2.1 centimeters (AP x TV x CC).  The AP extent of the associated non mass enhancement measures  significantly larger than depicted on ultrasound, and extends towards the  site of malignancy in the retroareolar region.\par \par \par Family hx: maternal GM breast cancer (early 50's), maternal 1st cousin (late 50's), 1 maternal aunts ovarian, another maternal aunt w/ 2 unknown cancers. Father w/ prostate cancer. 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 No illicit drug use.\par \par PMH: Hashimoto's (53 y/o), was on thyroid medication in the past, no longer, HTN, arthritis, pre-diabetic, reflux (takes an occasional Zantac, and ventral hernia. Claustrophobia w/ MRI, situational anxiety/depression.\par \par Past sx hx: polyp removal (was told benign), cholecystomy, L knee sx. \par \par Health Care Providers:\par PCP: Dr. Rashard Posada ([place])\par \par Treatment summary:\par 9/28/18 - 11/9/18  dose dense AC x 4 cycles\par 11/23/18 - 2/8/19 weekly Taxol 80 mg/m2 x 12 weeks [de-identified] : poorly differentiated IDC [de-identified] : ER>90% UT>90%, HER2 negative [de-identified] : post neoadjuvant DDAC - ypT1c ypN1a = IIA [de-identified] : Returns for follow up visit.\par Started Anastrazole 5/20/21\par Reports intermittent RUQ pain x 2 months. No associated with N/V. Notes lack of appetite. \par Reports hot flashes, 2x /night, notices improvement since transitioning from Letrozole to Anastrazole, On Gabapentin 300 mg hs \par Reports constant sweating that starts at the top of her head, sweat covers her face, notes this happens all day long. \par Reports trying to establishing new care with cardiologist \par Reports planning to move to [place] in the beginning of 2023\par Reports a couple of glasses of wine/week \par Denies vomiting, diarrhea, constipation\par On Vitamin D3 \par Has not obtained surveillance CT Chest

## 2022-08-30 NOTE — PHYSICAL EXAM
[Restricted in physically strenuous activity but ambulatory and able to carry out work of a light or sedentary nature] : Status 1- Restricted in physically strenuous activity but ambulatory and able to carry out work of a light or sedentary nature, e.g., light house work, office work [Obese] : obese [Normal] : affect appropriate [de-identified] : supple [de-identified] : no edema [de-identified] : bilateral reconstructed breasts, no masses palpated bilaterally, negative axillary adenopathy. [de-identified] : soft, non tender, no distended

## 2022-09-09 ENCOUNTER — TRANSCRIPTION ENCOUNTER (OUTPATIENT)
Age: 62
End: 2022-09-09

## 2022-09-09 ENCOUNTER — RESULT REVIEW (OUTPATIENT)
Age: 62
End: 2022-09-09

## 2022-09-14 ENCOUNTER — OUTPATIENT (OUTPATIENT)
Dept: OUTPATIENT SERVICES | Facility: HOSPITAL | Age: 62
LOS: 1 days | End: 2022-09-14

## 2022-09-14 ENCOUNTER — APPOINTMENT (OUTPATIENT)
Dept: CT IMAGING | Facility: CLINIC | Age: 62
End: 2022-09-14

## 2022-09-14 DIAGNOSIS — Z98.890 OTHER SPECIFIED POSTPROCEDURAL STATES: Chronic | ICD-10-CM

## 2022-09-14 DIAGNOSIS — K82.9 DISEASE OF GALLBLADDER, UNSPECIFIED: Chronic | ICD-10-CM

## 2022-09-14 DIAGNOSIS — Z00.8 ENCOUNTER FOR OTHER GENERAL EXAMINATION: ICD-10-CM

## 2022-09-14 PROCEDURE — 71260 CT THORAX DX C+: CPT | Mod: 26

## 2022-09-14 PROCEDURE — 74177 CT ABD & PELVIS W/CONTRAST: CPT | Mod: 26

## 2022-09-16 ENCOUNTER — NON-APPOINTMENT (OUTPATIENT)
Age: 62
End: 2022-09-16

## 2022-11-11 NOTE — DISEASE MANAGEMENT
The patient is Watcher - Medium risk of patient condition declining or worsening    Shift Goals  Clinical Goals: monitor swelling L upper leg, monitor h/h and s/s of worsening bleeding  Patient Goals: control pain in leg  Family Goals: na    Progress made toward(s) clinical / shift goals: Pain controlled with Norco.     Patient is not progressing towards the following goals: Pt continues to require O2 and oxymask at night      Problem: Pain - Standard  Goal: Alleviation of pain or a reduction in pain to the patient’s comfort goal  Outcome: Progressing  Flowsheets (Taken 11/11/2022 0356)  Non Verbal Scale: Calm  Pain Rating Scale (NPRS): 3  Note: Assess and monitor for pain. Provide pharmacological and non-pharmacological interventions as appropriate. Re-evaluate and continue to monitor.       Problem: Respiratory  Goal: Patient will achieve/maintain optimum respiratory ventilation and gas exchange  Outcome: Progressing  Flowsheets (Taken 11/10/2022 2306 by Majo Dunn, C.N.A.)  O2 Delivery Device: Oxymask  Note: Assess and monitor pulmonary status. Adjust oxygen as needed to maintain adequate saturation. Encourage ambulation, turning, coughing and deep breathing. Educate and encourage use of IS as needed. Continue to monitor.        [FreeTextEntry4] : IDC left breast, ER+/OR+, HER2-, grade 3 [TTNM] : 1c [NTNM] : 1a [MTNM] : 0

## 2022-11-30 ENCOUNTER — OUTPATIENT (OUTPATIENT)
Dept: OUTPATIENT SERVICES | Facility: HOSPITAL | Age: 62
LOS: 1 days | Discharge: HOME | End: 2022-11-30

## 2022-11-30 DIAGNOSIS — K82.9 DISEASE OF GALLBLADDER, UNSPECIFIED: Chronic | ICD-10-CM

## 2022-11-30 DIAGNOSIS — Z98.890 OTHER SPECIFIED POSTPROCEDURAL STATES: Chronic | ICD-10-CM

## 2022-11-30 DIAGNOSIS — C50.919 MALIGNANT NEOPLASM OF UNSPECIFIED SITE OF UNSPECIFIED FEMALE BREAST: ICD-10-CM

## 2022-12-05 ENCOUNTER — LABORATORY RESULT (OUTPATIENT)
Age: 62
End: 2022-12-05

## 2022-12-05 ENCOUNTER — APPOINTMENT (OUTPATIENT)
Dept: HEMATOLOGY ONCOLOGY | Facility: CLINIC | Age: 62
End: 2022-12-05

## 2022-12-05 ENCOUNTER — RESULT REVIEW (OUTPATIENT)
Age: 62
End: 2022-12-05

## 2022-12-05 VITALS
HEART RATE: 132 BPM | WEIGHT: 228.06 LBS | SYSTOLIC BLOOD PRESSURE: 147 MMHG | BODY MASS INDEX: 37.95 KG/M2 | OXYGEN SATURATION: 95 % | DIASTOLIC BLOOD PRESSURE: 83 MMHG

## 2022-12-05 LAB
BASOPHILS # BLD AUTO: 0.1 K/UL — SIGNIFICANT CHANGE UP (ref 0–0.2)
BASOPHILS NFR BLD AUTO: 1 % — SIGNIFICANT CHANGE UP (ref 0–2)
EOSINOPHIL # BLD AUTO: 0.2 K/UL — SIGNIFICANT CHANGE UP (ref 0–0.5)
EOSINOPHIL NFR BLD AUTO: 3.6 % — SIGNIFICANT CHANGE UP (ref 0–6)
HCT VFR BLD CALC: 49 % — HIGH (ref 34.5–45)
HGB BLD-MCNC: 16.4 G/DL — HIGH (ref 11.5–15.5)
LYMPHOCYTES # BLD AUTO: 1.6 K/UL — SIGNIFICANT CHANGE UP (ref 1–3.3)
LYMPHOCYTES # BLD AUTO: 24.5 % — SIGNIFICANT CHANGE UP (ref 13–44)
MCHC RBC-ENTMCNC: 31.7 PG — SIGNIFICANT CHANGE UP (ref 27–34)
MCHC RBC-ENTMCNC: 33.6 G/DL — SIGNIFICANT CHANGE UP (ref 32–36)
MCV RBC AUTO: 94.3 FL — SIGNIFICANT CHANGE UP (ref 80–100)
MONOCYTES # BLD AUTO: 0.4 K/UL — SIGNIFICANT CHANGE UP (ref 0–0.9)
MONOCYTES NFR BLD AUTO: 6.8 % — SIGNIFICANT CHANGE UP (ref 2–14)
NEUTROPHILS # BLD AUTO: 4.1 K/UL — SIGNIFICANT CHANGE UP (ref 1.8–7.4)
NEUTROPHILS NFR BLD AUTO: 64.1 % — SIGNIFICANT CHANGE UP (ref 43–77)
PLATELET # BLD AUTO: 233 K/UL — SIGNIFICANT CHANGE UP (ref 150–400)
RBC # BLD: 5.19 M/UL — SIGNIFICANT CHANGE UP (ref 3.8–5.2)
RBC # FLD: 11.9 % — SIGNIFICANT CHANGE UP (ref 10.3–14.5)
WBC # BLD: 6.4 K/UL — SIGNIFICANT CHANGE UP (ref 3.8–10.5)
WBC # FLD AUTO: 6.4 K/UL — SIGNIFICANT CHANGE UP (ref 3.8–10.5)

## 2022-12-05 PROCEDURE — 99214 OFFICE O/P EST MOD 30 MIN: CPT

## 2022-12-05 NOTE — HISTORY OF PRESENT ILLNESS
[Disease: _____________________] : Disease: [unfilled] [T: ___] : T[unfilled] [N: ___] : N[unfilled] [AJCC Stage: ____] : AJCC Stage: [unfilled] [de-identified] : Ms. 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   \par \par At initial presentation, she noted L nipple inversion early August 2018 and was sent for a diagnostic mammo and sonogram by her PCP. In addition, she also had noted a left breast mass ~ 8 months ago, and began to notice more pain at the site. She has not gone for screening mammo in a few years. In the last year, she has lost both her parents, and w/ other family issues, neglected the left breast mass. \par \par Mammogram on 8/27/18 demonstrated a 3.0 cm spiculated mass in the left breast retroareolar region overlying skin retraction and nipple inversion, highly suspicious for malignancy.  US breast showed left breast retroareolar 2.5 x 2 x 3.5 cm irregular hypoechoic solid shadowing mass with ill defined borders corresponding with spiculated mass demonstrated mammographically with morphologic features highly suspicious for malignancy as well as 1.3 x 0.5 x 1.0 cm cystic cluster at 3:00 7cmfn. \par \par She saw Dr. Riggs on 8/28/18 and US guided core biopsy was performed on 8/30/18.  \par \par 8/30/18 Pathology\par 1. Left breast, retroareolar, ultrasound guided core biopsy - poorly differentiated IDC with micropapillary features. ER 90%, AK 95%, HER2 negative.\par - Steven score 8/9 (3 + 3 + 2) in this limited material.\par  - Invasive tumor measures at least 1.3 cm\par   2. Left breast, 3:00, 6 cm fn, - poorly differentiated IDC  with  micropapillary features. ER>90% AK>90%, HER2 negative\par - Galloway score 8/9 \par \par 9/14/18 MRI breast - LEFT BREAST: *  An irregularly shaped enhancing mass in the left retroareolar region   is compatible with a site of biopsy-proven malignancy. It measures  approximately 2.4 x 2.1 x 2.2 centimeters (AP by TV by CC). There is  involvement of the nipple, with retraction. Susceptibility artifact from  the biopsy marker is noted in the center of the mass. *  In the 3:00 axis, middle third, there is an irregularly shaped  enhancing mass with surrounding non mass enhancement. This is compatible  with the additional site of biopsy-proven malignancy. Overall, the  abnormal enhancement measures 6.7 x 2.8 x 2.1 centimeters (AP x TV x CC).  The AP extent of the associated non mass enhancement measures  significantly larger than depicted on ultrasound, and extends towards the  site of malignancy in the retroareolar region.\par \par \par Family hx: maternal GM breast cancer (early 50's), maternal 1st cousin (late 50's), 1 maternal aunts ovarian, another maternal aunt w/ 2 unknown cancers. Father w/ prostate cancer. 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 No illicit drug use.\par \par PMH: Hashimoto's (53 y/o), was on thyroid medication in the past, no longer, HTN, arthritis, pre-diabetic, reflux (takes an occasional Zantac, and ventral hernia. Claustrophobia w/ MRI, situational anxiety/depression.\par \par Past sx hx: polyp removal (was told benign), cholecystomy, L knee sx. \par \par Health Care Providers:\par PCP: Dr. Rashard Posada ([facility])\par \par Treatment summary:\par 9/28/18 - 11/9/18  dose dense AC x 4 cycles\par 11/23/18 - 2/8/19 weekly Taxol 80 mg/m2 x 12 weeks [de-identified] : poorly differentiated IDC [de-identified] : ER>90% NJ>90%, HER2 negative [de-identified] : post neoadjuvant DDAC - ypT1c ypN1a = IIA [de-identified] : Returns for follow up visit.\par Chronic RUQ pain x 5 months now, is improved compare to prior\par Notes nausea 3 times per week, but no vomiting.\par Notes hot flashes 4  x per night, but notes constant sweating all the time.\par No weight gain. \par Notes that she is not any more anxious than she was prior.\par STill moving to [place] early next year

## 2022-12-05 NOTE — PHYSICAL EXAM
[Restricted in physically strenuous activity but ambulatory and able to carry out work of a light or sedentary nature] : Status 1- Restricted in physically strenuous activity but ambulatory and able to carry out work of a light or sedentary nature, e.g., light house work, office work [Obese] : obese [Normal] : affect appropriate [de-identified] : supple [de-identified] : no edema [de-identified] : bilateral reconstructed breasts, no masses palpated bilaterally, negative axillary adenopathy.

## 2022-12-05 NOTE — ASSESSMENT
[FreeTextEntry1] : 63 y/o postmenopausal female with locally advanced poorly differentiated left breast IDC,  ER>90%, HI>90%, HER2 negative.  Breast MRI with multicentric disease in left breast, largest abnormal enhancement measures 6.7 cm and extends towards 2.4 cm retroareolar mass.  Completed NACT with DD AC followed by Taxol on 2/8/19.\par S/p bilateral mastectomy with SUNNI reconstruction on 3/27/19. She had residual ypT1c ypN1 disease (2/7 LN positive with extracapsular extension + LVI).  There were no clinically abnormal nodes on presurgery MRIs or exam. Completed PMRT given residual kevin disease post NACT in  August 2019.   Received Letrozole ~ 4/26/19 - 12/2020, drug holiday due to severe joint pain 12/2020- 5/20/2021, though holiday was only intended for 4-6 weeks. Started Anastrazole 5/20/21\par \par \par Diaphoresis continues. RUQ is improved\par Reviewed 9/14/22 CT c/a/p showed stable tiny lung nodules compared to 2019, and no evidence of metastatic disease\par \par Plan:\par -Continue Anastrazole\par -CIPN/ Anxiety/depression- Duloxetine to 90mg daily \par -Hot flashes - on gabpentin\par -Diaphoresis - ?duloxetine related (reported in <10% patients). check TSH, was previously on meds but stopped sometime ago.\par -Osteopenia- defers Prolia/Zometa. Continue calcium and vitamin D. \par -Lung nodules- -stable as above. \par -RTO 4 months

## 2022-12-06 LAB
25(OH)D3 SERPL-MCNC: 30.7 NG/ML
ALBUMIN SERPL ELPH-MCNC: 4.4 G/DL
ALP BLD-CCNC: 102 U/L
ALT SERPL-CCNC: 24 U/L
ANION GAP SERPL CALC-SCNC: 15 MMOL/L
AST SERPL-CCNC: 27 U/L
BILIRUB SERPL-MCNC: 0.4 MG/DL
BUN SERPL-MCNC: 12 MG/DL
CALCIUM SERPL-MCNC: 10 MG/DL
CHLORIDE SERPL-SCNC: 100 MMOL/L
CO2 SERPL-SCNC: 24 MMOL/L
CREAT SERPL-MCNC: 0.85 MG/DL
EGFR: 77 ML/MIN/1.73M2
GLUCOSE SERPL-MCNC: 127 MG/DL
POTASSIUM SERPL-SCNC: 4.4 MMOL/L
PROT SERPL-MCNC: 6.8 G/DL
SODIUM SERPL-SCNC: 139 MMOL/L

## 2022-12-23 ENCOUNTER — NON-APPOINTMENT (OUTPATIENT)
Age: 62
End: 2022-12-23

## 2022-12-23 LAB — EPO SERPL-MCNC: 10 MIU/ML

## 2022-12-29 NOTE — H&P PST ADULT - GASTROINTESTINAL DETAILS
Subjective:       Patient ID: Gely Green is a 71 y.o. female.    Chief Complaint: Sinusitis    Pt here for f/u as her PCP is not available. She saw our PA 4 wks ago for 6 mos of chronic sinus issues. She was previously tx with steroids and antibiotics but had recurrence of symptoms for which she was given augmentin and prednisone and told stay on nasonex and zyrtec. She has improved and symptoms resolved except for some residual sinus pressure especially on R which seems to be improving. She saw ENT earlier this year for chronic rhinitis and suggested regular use of flonase, astelin and nasal rinses.     Review of Systems   Constitutional:  Negative for fever.   HENT:  Negative for ear pain, rhinorrhea and sore throat.    Respiratory:  Negative for cough and shortness of breath.    Cardiovascular:  Negative for chest pain.       Objective:      Physical Exam  Constitutional:       Appearance: Normal appearance. She is well-developed.   HENT:      Right Ear: Tympanic membrane, ear canal and external ear normal.      Left Ear: Tympanic membrane, ear canal and external ear normal.      Nose: No mucosal edema or rhinorrhea.      Mouth/Throat:      Pharynx: No oropharyngeal exudate or posterior oropharyngeal erythema.   Neck:      Thyroid: No thyromegaly.   Cardiovascular:      Rate and Rhythm: Normal rate and regular rhythm.      Heart sounds: Normal heart sounds.   Pulmonary:      Effort: Pulmonary effort is normal.      Breath sounds: Normal breath sounds.   Musculoskeletal:      Cervical back: Neck supple.      Right lower leg: No edema.      Left lower leg: No edema.   Lymphadenopathy:      Cervical: No cervical adenopathy.   Neurological:      Mental Status: She is alert and oriented to person, place, and time.      Cranial Nerves: No cranial nerve deficit.   Psychiatric:         Mood and Affect: Mood normal.         Behavior: Behavior normal.       Assessment:       Problem List Items Addressed This Visit           Psychiatric    Adjustment disorder with mixed anxiety and depressed mood     Other Visit Diagnoses       Chronic sinusitis, unspecified location    -  Primary            Plan:       Resume nasal rinses--proper use d/w pt  F/u ENT if sinus pressure does not resolve in next 4 wks               nontender/soft/no distention

## 2023-02-16 ENCOUNTER — TRANSCRIPTION ENCOUNTER (OUTPATIENT)
Age: 63
End: 2023-02-16

## 2023-04-05 ENCOUNTER — OUTPATIENT (OUTPATIENT)
Dept: OUTPATIENT SERVICES | Facility: HOSPITAL | Age: 63
LOS: 1 days | Discharge: HOME | End: 2023-04-05

## 2023-04-05 DIAGNOSIS — K82.9 DISEASE OF GALLBLADDER, UNSPECIFIED: Chronic | ICD-10-CM

## 2023-04-05 DIAGNOSIS — Z98.890 OTHER SPECIFIED POSTPROCEDURAL STATES: Chronic | ICD-10-CM

## 2023-04-05 DIAGNOSIS — C50.919 MALIGNANT NEOPLASM OF UNSPECIFIED SITE OF UNSPECIFIED FEMALE BREAST: ICD-10-CM

## 2023-04-06 ENCOUNTER — APPOINTMENT (OUTPATIENT)
Dept: HEMATOLOGY ONCOLOGY | Facility: CLINIC | Age: 63
End: 2023-04-06

## 2023-04-28 ENCOUNTER — TRANSCRIPTION ENCOUNTER (OUTPATIENT)
Age: 63
End: 2023-04-28

## 2023-07-12 RX ORDER — DULOXETINE HYDROCHLORIDE 30 MG/1
30 CAPSULE, DELAYED RELEASE PELLETS ORAL
Qty: 90 | Refills: 0 | Status: ACTIVE | COMMUNITY
Start: 2022-04-13 | End: 1900-01-01

## 2023-09-07 ENCOUNTER — OUTPATIENT (OUTPATIENT)
Dept: OUTPATIENT SERVICES | Facility: HOSPITAL | Age: 63
LOS: 1 days | Discharge: HOME | End: 2023-09-07

## 2023-09-07 DIAGNOSIS — C50.919 MALIGNANT NEOPLASM OF UNSPECIFIED SITE OF UNSPECIFIED FEMALE BREAST: ICD-10-CM

## 2023-09-07 DIAGNOSIS — K82.9 DISEASE OF GALLBLADDER, UNSPECIFIED: Chronic | ICD-10-CM

## 2023-09-07 DIAGNOSIS — Z98.890 OTHER SPECIFIED POSTPROCEDURAL STATES: Chronic | ICD-10-CM

## 2023-09-11 ENCOUNTER — LABORATORY RESULT (OUTPATIENT)
Age: 63
End: 2023-09-11

## 2023-09-11 ENCOUNTER — RESULT REVIEW (OUTPATIENT)
Age: 63
End: 2023-09-11

## 2023-09-11 ENCOUNTER — APPOINTMENT (OUTPATIENT)
Dept: HEMATOLOGY ONCOLOGY | Facility: CLINIC | Age: 63
End: 2023-09-11
Payer: MEDICAID

## 2023-09-11 VITALS
HEIGHT: 63 IN | HEART RATE: 127 BPM | SYSTOLIC BLOOD PRESSURE: 158 MMHG | OXYGEN SATURATION: 92 % | BODY MASS INDEX: 39.16 KG/M2 | WEIGHT: 221.01 LBS | DIASTOLIC BLOOD PRESSURE: 105 MMHG

## 2023-09-11 DIAGNOSIS — T45.1X5A FLUSHING: ICD-10-CM

## 2023-09-11 DIAGNOSIS — T45.1X5A PAIN IN UNSPECIFIED JOINT: ICD-10-CM

## 2023-09-11 DIAGNOSIS — R79.89 OTHER SPECIFIED ABNORMAL FINDINGS OF BLOOD CHEMISTRY: ICD-10-CM

## 2023-09-11 DIAGNOSIS — M25.50 PAIN IN UNSPECIFIED JOINT: ICD-10-CM

## 2023-09-11 DIAGNOSIS — D75.1 SECONDARY POLYCYTHEMIA: ICD-10-CM

## 2023-09-11 DIAGNOSIS — T45.1X5A DRUG-INDUCED POLYNEUROPATHY: ICD-10-CM

## 2023-09-11 DIAGNOSIS — F41.9 ANXIETY DISORDER, UNSPECIFIED: ICD-10-CM

## 2023-09-11 DIAGNOSIS — G62.0 DRUG-INDUCED POLYNEUROPATHY: ICD-10-CM

## 2023-09-11 DIAGNOSIS — R23.2 FLUSHING: ICD-10-CM

## 2023-09-11 DIAGNOSIS — R61 GENERALIZED HYPERHIDROSIS: ICD-10-CM

## 2023-09-11 DIAGNOSIS — R93.89 ABNORMAL FINDINGS ON DIAGNOSTIC IMAGING OF OTHER SPECIFIED BODY STRUCTURES: ICD-10-CM

## 2023-09-11 LAB
BASOPHILS # BLD AUTO: 0.1 K/UL — SIGNIFICANT CHANGE UP (ref 0–0.2)
BASOPHILS NFR BLD AUTO: 0.9 % — SIGNIFICANT CHANGE UP (ref 0–2)
EOSINOPHIL # BLD AUTO: 0.2 K/UL — SIGNIFICANT CHANGE UP (ref 0–0.5)
EOSINOPHIL NFR BLD AUTO: 2.2 % — SIGNIFICANT CHANGE UP (ref 0–6)
HCT VFR BLD CALC: 50.4 % — HIGH (ref 34.5–45)
HGB BLD-MCNC: 17.6 G/DL — HIGH (ref 11.5–15.5)
LYMPHOCYTES # BLD AUTO: 1.8 K/UL — SIGNIFICANT CHANGE UP (ref 1–3.3)
LYMPHOCYTES # BLD AUTO: 26.1 % — SIGNIFICANT CHANGE UP (ref 13–44)
MCHC RBC-ENTMCNC: 32 PG — SIGNIFICANT CHANGE UP (ref 27–34)
MCHC RBC-ENTMCNC: 35 G/DL — SIGNIFICANT CHANGE UP (ref 32–36)
MCV RBC AUTO: 91.4 FL — SIGNIFICANT CHANGE UP (ref 80–100)
MONOCYTES # BLD AUTO: 0.3 K/UL — SIGNIFICANT CHANGE UP (ref 0–0.9)
MONOCYTES NFR BLD AUTO: 5.1 % — SIGNIFICANT CHANGE UP (ref 2–14)
NEUTROPHILS # BLD AUTO: 4.4 K/UL — SIGNIFICANT CHANGE UP (ref 1.8–7.4)
NEUTROPHILS NFR BLD AUTO: 65.7 % — SIGNIFICANT CHANGE UP (ref 43–77)
PLATELET # BLD AUTO: 238 K/UL — SIGNIFICANT CHANGE UP (ref 150–400)
RBC # BLD: 5.51 M/UL — HIGH (ref 3.8–5.2)
RBC # FLD: 11.9 % — SIGNIFICANT CHANGE UP (ref 10.3–14.5)
WBC # BLD: 6.7 K/UL — SIGNIFICANT CHANGE UP (ref 3.8–10.5)
WBC # FLD AUTO: 6.7 K/UL — SIGNIFICANT CHANGE UP (ref 3.8–10.5)

## 2023-09-11 PROCEDURE — 99214 OFFICE O/P EST MOD 30 MIN: CPT

## 2023-09-11 RX ORDER — GABAPENTIN 400 MG/1
400 CAPSULE ORAL TWICE DAILY
Qty: 60 | Refills: 2 | Status: DISCONTINUED | COMMUNITY
Start: 2021-12-13 | End: 2023-09-11

## 2023-09-15 LAB
25(OH)D3 SERPL-MCNC: 38.2 NG/ML
ALBUMIN SERPL ELPH-MCNC: 4.6 G/DL
ALP BLD-CCNC: 103 U/L
ALT SERPL-CCNC: 18 U/L
ANION GAP SERPL CALC-SCNC: 13 MMOL/L
AST SERPL-CCNC: 18 U/L
BILIRUB SERPL-MCNC: 0.5 MG/DL
BUN SERPL-MCNC: 15 MG/DL
CALCIUM SERPL-MCNC: 10.2 MG/DL
CHLORIDE SERPL-SCNC: 103 MMOL/L
CO2 SERPL-SCNC: 26 MMOL/L
CREAT SERPL-MCNC: 0.92 MG/DL
EGFR: 70 ML/MIN/1.73M2
GLUCOSE SERPL-MCNC: 132 MG/DL
POTASSIUM SERPL-SCNC: 5.4 MMOL/L
PROT SERPL-MCNC: 7.1 G/DL
SODIUM SERPL-SCNC: 141 MMOL/L

## 2023-09-27 ENCOUNTER — TRANSCRIPTION ENCOUNTER (OUTPATIENT)
Age: 63
End: 2023-09-27

## 2023-10-05 ENCOUNTER — APPOINTMENT (OUTPATIENT)
Dept: RADIOLOGY | Facility: CLINIC | Age: 63
End: 2023-10-05
Payer: MEDICAID

## 2023-10-05 ENCOUNTER — APPOINTMENT (OUTPATIENT)
Dept: CT IMAGING | Facility: CLINIC | Age: 63
End: 2023-10-05
Payer: MEDICAID

## 2023-10-05 ENCOUNTER — OUTPATIENT (OUTPATIENT)
Dept: OUTPATIENT SERVICES | Facility: HOSPITAL | Age: 63
LOS: 1 days | End: 2023-10-05
Payer: MEDICAID

## 2023-10-05 DIAGNOSIS — Z79.811 LONG TERM (CURRENT) USE OF AROMATASE INHIBITORS: ICD-10-CM

## 2023-10-05 DIAGNOSIS — R93.89 ABNORMAL FINDINGS ON DIAGNOSTIC IMAGING OF OTHER SPECIFIED BODY STRUCTURES: ICD-10-CM

## 2023-10-05 DIAGNOSIS — K82.9 DISEASE OF GALLBLADDER, UNSPECIFIED: Chronic | ICD-10-CM

## 2023-10-05 DIAGNOSIS — M25.50 PAIN IN UNSPECIFIED JOINT: ICD-10-CM

## 2023-10-05 DIAGNOSIS — Z98.890 OTHER SPECIFIED POSTPROCEDURAL STATES: Chronic | ICD-10-CM

## 2023-10-05 PROCEDURE — 71250 CT THORAX DX C-: CPT | Mod: 26

## 2023-10-05 PROCEDURE — 71250 CT THORAX DX C-: CPT

## 2023-10-05 PROCEDURE — 77080 DXA BONE DENSITY AXIAL: CPT | Mod: 26

## 2023-10-05 PROCEDURE — 77080 DXA BONE DENSITY AXIAL: CPT

## 2023-10-16 ENCOUNTER — TRANSCRIPTION ENCOUNTER (OUTPATIENT)
Age: 63
End: 2023-10-16

## 2023-10-19 ENCOUNTER — TRANSCRIPTION ENCOUNTER (OUTPATIENT)
Age: 63
End: 2023-10-19

## 2024-03-05 ENCOUNTER — OUTPATIENT (OUTPATIENT)
Dept: OUTPATIENT SERVICES | Facility: HOSPITAL | Age: 64
LOS: 1 days | Discharge: HOME | End: 2024-03-05

## 2024-03-05 DIAGNOSIS — Z98.890 OTHER SPECIFIED POSTPROCEDURAL STATES: Chronic | ICD-10-CM

## 2024-03-05 DIAGNOSIS — C50.919 MALIGNANT NEOPLASM OF UNSPECIFIED SITE OF UNSPECIFIED FEMALE BREAST: ICD-10-CM

## 2024-03-05 DIAGNOSIS — K82.9 DISEASE OF GALLBLADDER, UNSPECIFIED: Chronic | ICD-10-CM

## 2024-03-12 ENCOUNTER — TRANSCRIPTION ENCOUNTER (OUTPATIENT)
Age: 64
End: 2024-03-12

## 2024-03-12 RX ORDER — ANASTROZOLE TABLETS 1 MG/1
1 TABLET ORAL
Qty: 90 | Refills: 1 | Status: ACTIVE | COMMUNITY
Start: 2021-05-20 | End: 1900-01-01

## 2024-04-16 ENCOUNTER — RESULT REVIEW (OUTPATIENT)
Age: 64
End: 2024-04-16

## 2024-04-16 ENCOUNTER — APPOINTMENT (OUTPATIENT)
Dept: HEMATOLOGY ONCOLOGY | Facility: CLINIC | Age: 64
End: 2024-04-16
Payer: MEDICAID

## 2024-04-16 VITALS
DIASTOLIC BLOOD PRESSURE: 98 MMHG | SYSTOLIC BLOOD PRESSURE: 157 MMHG | HEIGHT: 65 IN | WEIGHT: 215.04 LBS | HEART RATE: 128 BPM | OXYGEN SATURATION: 95 % | BODY MASS INDEX: 35.83 KG/M2

## 2024-04-16 DIAGNOSIS — M85.80 OTHER SPECIFIED DISORDERS OF BONE DENSITY AND STRUCTURE, UNSPECIFIED SITE: ICD-10-CM

## 2024-04-16 DIAGNOSIS — Z79.811 LONG TERM (CURRENT) USE OF AROMATASE INHIBITORS: ICD-10-CM

## 2024-04-16 LAB
BASOPHILS # BLD AUTO: 0.1 K/UL — SIGNIFICANT CHANGE UP (ref 0–0.2)
BASOPHILS NFR BLD AUTO: 1.1 % — SIGNIFICANT CHANGE UP (ref 0–2)
EOSINOPHIL # BLD AUTO: 0.1 K/UL — SIGNIFICANT CHANGE UP (ref 0–0.5)
EOSINOPHIL NFR BLD AUTO: 1.4 % — SIGNIFICANT CHANGE UP (ref 0–6)
HCT VFR BLD CALC: 47.8 % — HIGH (ref 34.5–45)
HGB BLD-MCNC: 16.3 G/DL — HIGH (ref 11.5–15.5)
LYMPHOCYTES # BLD AUTO: 2.1 K/UL — SIGNIFICANT CHANGE UP (ref 1–3.3)
LYMPHOCYTES # BLD AUTO: 29.4 % — SIGNIFICANT CHANGE UP (ref 13–44)
MCHC RBC-ENTMCNC: 31.8 PG — SIGNIFICANT CHANGE UP (ref 27–34)
MCHC RBC-ENTMCNC: 34.1 G/DL — SIGNIFICANT CHANGE UP (ref 32–36)
MCV RBC AUTO: 93.4 FL — SIGNIFICANT CHANGE UP (ref 80–100)
MONOCYTES # BLD AUTO: 0.5 K/UL — SIGNIFICANT CHANGE UP (ref 0–0.9)
MONOCYTES NFR BLD AUTO: 6.4 % — SIGNIFICANT CHANGE UP (ref 2–14)
NEUTROPHILS # BLD AUTO: 4.3 K/UL — SIGNIFICANT CHANGE UP (ref 1.8–7.4)
NEUTROPHILS NFR BLD AUTO: 61.6 % — SIGNIFICANT CHANGE UP (ref 43–77)
PLATELET # BLD AUTO: 231 K/UL — SIGNIFICANT CHANGE UP (ref 150–400)
RBC # BLD: 5.11 M/UL — SIGNIFICANT CHANGE UP (ref 3.8–5.2)
RBC # FLD: 11.9 % — SIGNIFICANT CHANGE UP (ref 10.3–14.5)
WBC # BLD: 7 K/UL — SIGNIFICANT CHANGE UP (ref 3.8–10.5)
WBC # FLD AUTO: 7 K/UL — SIGNIFICANT CHANGE UP (ref 3.8–10.5)

## 2024-04-16 PROCEDURE — 99214 OFFICE O/P EST MOD 30 MIN: CPT

## 2024-04-16 NOTE — PHYSICAL EXAM
[Restricted in physically strenuous activity but ambulatory and able to carry out work of a light or sedentary nature] : Status 1- Restricted in physically strenuous activity but ambulatory and able to carry out work of a light or sedentary nature, e.g., light house work, office work [Obese] : obese [Normal] : grossly intact [de-identified] : except tachycardia 120's [de-identified] : bilateral reconstructed breasts, no masses palpated bilaterally, negative axillary adenopathy.

## 2024-04-16 NOTE — ASSESSMENT
[FreeTextEntry1] : 63 y/o postmenopausal female with locally advanced poorly differentiated left breast IDC,  ER>90%, FL>90%, HER2 negative.  Breast MRI with multicentric disease in left breast, largest abnormal enhancement measures 6.7 cm and extends towards 2.4 cm retroareolar mass.   Completed NACT with DD AC followed by Taxol on 2/8/19. S/p bilateral mastectomy with SUNNI reconstruction on 3/27/19. She had residual ypT1c ypN1 disease (2/7 LN positive with extracapsular extension + LVI).  There were no clinically abnormal nodes on pre surgery MRIs or exam.  Completed PMRT given residual kevin disease post NACT in  August 2019.    Received Letrozole ~ 4/26/19 - 12/2020, drug holiday due to severe joint pain 12/2020- 5/20/2021, though holiday was only intended for 4-6 weeks. Started Anastrozole 5/20/21.  10/5/23 CT chest- few stable nodules measuring up to 5mm since 2019 TSH 13.8 (12/5/22) --> 12.60 (9/11/23) with normal T4 09/11/2023 CBC: WBC 6.7 K, HGB 17.6 g, HCT 50.4 %,  K  4/16/24 CB: WBC 7.0 HGB 16.3 HCT 47.8  Plan: -Continue Anastrozole ~ 4.8 yrs completed -Lab today-pending CMP, VIT D level -CIPN/ Anxiety/depression- Duloxetine 90mg daily  -Persistent HTN, tachycardia, elevated TSH - She has not followed PCP, advised PCP to follow up ASAP, Pending cardiology appointment on 4/22/24 -Osteopenia--defers Prolia/Zometa. Continue calcium and vitamin D. DEXA scan due in 10/25 -Erythrocytosis- Hgb 16.3 HCT 47.8 stable  - Advised age-appropriate screenings/ follow up with PCP -RTO 6 months with MD

## 2024-04-16 NOTE — HISTORY OF PRESENT ILLNESS
[Disease: _____________________] : Disease: [unfilled] [T: ___] : T[unfilled] [N: ___] : N[unfilled] [AJCC Stage: ____] : AJCC Stage: [unfilled] [0 - No Distress] : Distress Level: 0 [ECOG Performance Status: 0 - Fully active, able to carry on all pre-disease performance without restriction] : Performance Status: 0 - Fully active, able to carry on all pre-disease performance without restriction [de-identified] : Ms. KRISTIE GARZON, is a postmenopausal female who was diagnosed with poorly differentiated invasive ductal carcinoma of left breast cancer, ER >90% IN>90%, HER2 negative.     At initial presentation, she noted L nipple inversion early August 2018 and was sent for a diagnostic mammo and sonogram by her PCP. In addition, she also had noted a left breast mass ~ 8 months ago, and began to notice more pain at the site. She has not gone for screening mammo in a few years. In the last year, she has lost both her parents, and w/ other family issues, neglected the left breast mass.   Mammogram on 8/27/18 demonstrated a 3.0 cm spiculated mass in the left breast retroareolar region overlying skin retraction and nipple inversion, highly suspicious for malignancy.  US breast showed left breast retroareolar 2.5 x 2 x 3.5 cm irregular hypoechoic solid shadowing mass with ill defined borders corresponding with spiculated mass demonstrated mammographically with morphologic features highly suspicious for malignancy as well as 1.3 x 0.5 x 1.0 cm cystic cluster at 3:00 7cmfn.   She saw Dr. Riggs on 8/28/18 and US guided core biopsy was performed on 8/30/18.    8/30/18 Pathology 1. Left breast, retroareolar, ultrasound guided core biopsy - poorly differentiated IDC with micropapillary features. ER 90%, IN 95%, HER2 negative. - [place] score 8/9 (3 + 3 + 2) in this limited material.  - Invasive tumor measures at least 1.3 cm   2. Left breast, 3:00, 6 cm fn, - poorly differentiated IDC  with  micropapillary features. ER>90% IN>90%, HER2 negative - Steven score 8/9   9/14/18 MRI breast - LEFT BREAST: *  An irregularly shaped enhancing mass in the left retroareolar region   is compatible with a site of biopsy-proven malignancy. It measures  approximately 2.4 x 2.1 x 2.2 centimeters (AP by TV by CC). There is  involvement of the nipple, with retraction. Susceptibility artifact from  the biopsy marker is noted in the center of the mass. *  In the 3:00 axis, middle third, there is an irregularly shaped  enhancing mass with surrounding non mass enhancement. This is compatible  with the additional site of biopsy-proven malignancy. Overall, the  abnormal enhancement measures 6.7 x 2.8 x 2.1 centimeters (AP x TV x CC).  The AP extent of the associated non mass enhancement measures  significantly larger than depicted on ultrasound, and extends towards the  site of malignancy in the retroareolar region.   Family hx: maternal GM breast cancer (early 50's), maternal 1st cousin (late 50's), 1 maternal aunts ovarian, another maternal aunt w/ 2 unknown cancers. Father w/ prostate cancer. She states her younger sister did the BRCA testing and was negative.  Social: , no children, works as a book keeper, former smoker (1 ppd x 15 yrs, quit close to 30 yrs ago, Etoh occasional. No illicit drug use.  PMH: Hashimoto's (55 y/o), was on thyroid medication in the past, no longer, HTN, arthritis, pre-diabetic, reflux (takes an occasional Zantac, and ventral hernia. Claustrophobia w/ MRI, situational anxiety/depression.  Past sx hx: polyp removal (was told benign), cholecystomy, L knee sx.   Health Care Providers: PCP: Dr. Rashard Posada ([facility])  Treatment summary: 9/28/18 - 11/9/18  dose dense AC x 4 cycles 11/23/18 - 2/8/19 weekly Taxol 80 mg/m2 x 12 weeks [de-identified] : poorly differentiated IDC [de-identified] : ER>90% IN>90%, HER2 negative [de-identified] : post neoadjuvant DDAC - ypT1c ypN1a = IIA [de-identified] : Returns for follow up visit.  Continues on Anastrozole. Continues on duloxetine 90 mg.  Takes Vit D3 and Calcium Continues c/o arthralgia, hot flash, depression, anxiety at times. Has not followed up with PCP in a year, or cardiology for high blood pressure Denies recent illness or hospitalization. Denies fever, chills, weight loss, or gain, pedal edema, palpitation, dizziness, chest pian, sob, abdominal pain, myalgia and weakness, fatigue, headache, blurred vision, transient loss of vision, paresthesia's, slow mentation, and/or a sense of depersonalization, bleeding.  Denies nausea, vomiting, diarrhea, constipation, or urinary symptoms.

## 2024-04-16 NOTE — RESULTS/DATA
[FreeTextEntry1] : 9/14/22 CT c/a/p showed stable tiny lung nodules compared to 2019, and no evidence of metastatic disease   5/27/2021 DEXA - Osteopenia

## 2024-04-17 LAB
25(OH)D3 SERPL-MCNC: 40.8 NG/ML
ALBUMIN SERPL ELPH-MCNC: 4.6 G/DL
ALP BLD-CCNC: 103 U/L
ALT SERPL-CCNC: 19 U/L
ANION GAP SERPL CALC-SCNC: 13 MMOL/L
AST SERPL-CCNC: 19 U/L
BILIRUB SERPL-MCNC: 0.6 MG/DL
BUN SERPL-MCNC: 16 MG/DL
CALCIUM SERPL-MCNC: 10.2 MG/DL
CHLORIDE SERPL-SCNC: 101 MMOL/L
CO2 SERPL-SCNC: 25 MMOL/L
CREAT SERPL-MCNC: 0.85 MG/DL
EGFR: 77 ML/MIN/1.73M2
GLUCOSE SERPL-MCNC: 115 MG/DL
POTASSIUM SERPL-SCNC: 4.9 MMOL/L
PROT SERPL-MCNC: 7.2 G/DL
SODIUM SERPL-SCNC: 140 MMOL/L

## 2024-04-25 ENCOUNTER — NON-APPOINTMENT (OUTPATIENT)
Age: 64
End: 2024-04-25

## 2024-04-25 ENCOUNTER — APPOINTMENT (OUTPATIENT)
Dept: CARDIOLOGY | Facility: CLINIC | Age: 64
End: 2024-04-25
Payer: MEDICAID

## 2024-04-25 VITALS
BODY MASS INDEX: 35.16 KG/M2 | SYSTOLIC BLOOD PRESSURE: 140 MMHG | DIASTOLIC BLOOD PRESSURE: 100 MMHG | OXYGEN SATURATION: 94 % | WEIGHT: 211 LBS | HEART RATE: 124 BPM | HEIGHT: 65 IN

## 2024-04-25 DIAGNOSIS — R06.00 DYSPNEA, UNSPECIFIED: ICD-10-CM

## 2024-04-25 DIAGNOSIS — R00.0 TACHYCARDIA, UNSPECIFIED: ICD-10-CM

## 2024-04-25 DIAGNOSIS — C50.912 MALIGNANT NEOPLASM OF UNSPECIFIED SITE OF LEFT FEMALE BREAST: ICD-10-CM

## 2024-04-25 DIAGNOSIS — I10 ESSENTIAL (PRIMARY) HYPERTENSION: ICD-10-CM

## 2024-04-25 PROCEDURE — 99204 OFFICE O/P NEW MOD 45 MIN: CPT | Mod: 25

## 2024-04-25 PROCEDURE — 93000 ELECTROCARDIOGRAM COMPLETE: CPT

## 2024-04-25 PROCEDURE — G2211 COMPLEX E/M VISIT ADD ON: CPT | Mod: NC,1L

## 2024-04-25 RX ORDER — ATENOLOL 50 MG/1
50 TABLET ORAL
Qty: 90 | Refills: 3 | Status: ACTIVE | COMMUNITY
Start: 2024-04-25 | End: 1900-01-01

## 2024-04-25 NOTE — HISTORY OF PRESENT ILLNESS
[FreeTextEntry1] : 63F obesity (BMI 35), CARLY (intolerant to CPAP), h/o L-breast cancer (dx 2018, ER/AL+, HER2-) s/p neoadjuvant ddAC then Taxol and on anastrozole, bilateral mastectomy and radiation, HTN, presents for cardiology evaluation.   Reports noted HTN and tachycardia for few years, was seeing [facility] >1.5 years ago had Echo and Holter done told normal but not put on any medications, she was metoprolol stopped in 2019 as BP normalized after breast surgery, has chronic shortness of breath, no prior stress testing, walking about 3-4x per week for 45 minutes to an hour but unable to exercise due to chronic knee pain. Last seen PCP few years ago pending to establish care with new provider.   Office vitals noted hypertensive and tachycardic -130s started since 2021.   Prior TSH 2023 12.6 pending Endocrine eval.   Mother stroke Aunt with heart surgeries  HTN multiple family members Former smoker quit >30yrs Social alcohol use  Prior sleep study told with CARLY

## 2024-04-25 NOTE — DISCUSSION/SUMMARY
[FreeTextEntry1] : 63F obesity (BMI 35), CARLY (intolerant to CPAP), h/o L-breast cancer (dx 2018, ER/MT+, HER2-) s/p neoadjuvant ddAC then Taxol and on anastrozole, bilateral mastectomy and radiation, HTN, presents for cardiology evaluation.   Chronically uncontrolled HTN and chronic tachycardic since 2021, EKG in sinus tachycardia, will repeat Echo with strain imaging as prior anthracycline chemo use and obtain pharm nuclear stress test given exertional dyspnea unable to exercise, baseline tachycardia preclude CCTA.    1. HTN, sinus tachcyardia -start atenolol 50mg and uptitrate as BP and HR tolerate, goal BP <130/80 -need CARLY treatment consider oral appliance or Inspire device   2. Exertional dyspnea -await Echo and pharm nuclear stress test  3. h/o L-breast cancer -on anastrozole -monitor LV EF and GLS with repeat Echo  4. Obesity, CARLY -need weight loss and treatment of CARLY    Follow up in 2 months.  [EKG obtained to assist in diagnosis and management of assessed problem(s)] : EKG obtained to assist in diagnosis and management of assessed problem(s)

## 2024-04-25 NOTE — CARDIOLOGY SUMMARY
[de-identified] : 4/25/24- Sinus 111, normal axis, LVH, poor R-wave, QTc 395 [de-identified] : 9/2018- LV EF 65%, GSL -20%

## 2024-05-17 ENCOUNTER — APPOINTMENT (OUTPATIENT)
Dept: CARDIOLOGY | Facility: CLINIC | Age: 64
End: 2024-05-17
Payer: MEDICAID

## 2024-05-17 PROCEDURE — 93306 TTE W/DOPPLER COMPLETE: CPT

## 2024-05-25 ENCOUNTER — NON-APPOINTMENT (OUTPATIENT)
Age: 64
End: 2024-05-25

## 2024-05-28 ENCOUNTER — APPOINTMENT (OUTPATIENT)
Dept: CARDIOLOGY | Facility: CLINIC | Age: 64
End: 2024-05-28
Payer: MEDICAID

## 2024-05-28 PROCEDURE — A9502: CPT

## 2024-05-28 PROCEDURE — 93015 CV STRESS TEST SUPVJ I&R: CPT

## 2024-05-28 PROCEDURE — 78452 HT MUSCLE IMAGE SPECT MULT: CPT

## 2024-06-07 RX ORDER — DULOXETINE HYDROCHLORIDE 30 MG/1
30 CAPSULE, DELAYED RELEASE PELLETS ORAL
Qty: 90 | Refills: 5 | Status: ACTIVE | COMMUNITY
Start: 2019-06-17 | End: 1900-01-01

## 2024-06-11 ENCOUNTER — TRANSCRIPTION ENCOUNTER (OUTPATIENT)
Age: 64
End: 2024-06-11

## 2024-06-12 ENCOUNTER — TRANSCRIPTION ENCOUNTER (OUTPATIENT)
Age: 64
End: 2024-06-12

## 2024-06-27 ENCOUNTER — APPOINTMENT (OUTPATIENT)
Dept: CARDIOLOGY | Facility: CLINIC | Age: 64
End: 2024-06-27

## 2024-07-08 ENCOUNTER — TRANSCRIPTION ENCOUNTER (OUTPATIENT)
Age: 64
End: 2024-07-08

## 2024-07-29 ENCOUNTER — APPOINTMENT (OUTPATIENT)
Dept: CARDIOLOGY | Facility: CLINIC | Age: 64
End: 2024-07-29
Payer: MEDICAID

## 2024-07-29 VITALS
HEIGHT: 65 IN | BODY MASS INDEX: 34.99 KG/M2 | OXYGEN SATURATION: 91 % | DIASTOLIC BLOOD PRESSURE: 100 MMHG | SYSTOLIC BLOOD PRESSURE: 140 MMHG | HEART RATE: 63 BPM | WEIGHT: 210 LBS

## 2024-07-29 DIAGNOSIS — G47.33 OBSTRUCTIVE SLEEP APNEA (ADULT) (PEDIATRIC): ICD-10-CM

## 2024-07-29 DIAGNOSIS — R00.0 TACHYCARDIA, UNSPECIFIED: ICD-10-CM

## 2024-07-29 DIAGNOSIS — I10 ESSENTIAL (PRIMARY) HYPERTENSION: ICD-10-CM

## 2024-07-29 PROCEDURE — 99214 OFFICE O/P EST MOD 30 MIN: CPT | Mod: 25

## 2024-07-29 PROCEDURE — 93000 ELECTROCARDIOGRAM COMPLETE: CPT

## 2024-07-29 RX ORDER — ATENOLOL 25 MG/1
25 TABLET ORAL DAILY
Qty: 30 | Refills: 3 | Status: ACTIVE | COMMUNITY
Start: 2024-07-29 | End: 1900-01-01

## 2024-07-29 NOTE — CARDIOLOGY SUMMARY
[de-identified] : 4/25/24- Sinus 111, normal axis, LVH, poor R-wave, QTc 395 [de-identified] : 9/2018- LV EF 65%, GSL -20%

## 2024-07-29 NOTE — PHYSICAL EXAM
[No Acute Distress] : no acute distress [Obese] : obese [No Carotid Bruit] : no carotid bruit [Normal S1, S2] : normal S1, S2 [No Murmur] : no murmur [Clear Lung Fields] : clear lung fields [No Respiratory Distress] : no respiratory distress  [Normal Gait] : normal gait [No Edema] : no edema [Moves all extremities] : moves all extremities [Normal Speech] : normal speech [Alert and Oriented] : alert and oriented [Normal memory] : normal memory

## 2024-07-29 NOTE — REVIEW OF SYSTEMS
[Feeling Fatigued] : feeling fatigued [Dyspnea on exertion] : dyspnea during exertion [Chest Discomfort] : chest discomfort [Lower Ext Edema] : no extremity edema [Palpitations] : palpitations [Orthopnea] : no orthopnea [Syncope] : no syncope [Dizziness] : dizziness

## 2024-07-29 NOTE — DISCUSSION/SUMMARY
[FreeTextEntry1] : 63F obesity (BMI 35), CARLY (intolerant to CPAP), h/o L-breast cancer (dx 2018, ER/AR+, HER2-) s/p neoadjuvant ddAC then Taxol and on anastrozole, bilateral mastectomy and radiation, HTN, presented for cardiac evaluation 4/2024, underwent TTE 5/2024 revealing LVE 55-60% and borderline LVH, Pharmacologic NST 5/2024 revealed no evidence of ischemia or infarction, presents for follow up of HTN today.  Chronically uncontrolled HTN and chronic tachycardic since 2021, EKG in sinus rhythm 76bpm today. TTE and NST were WNL. BP still uncontrolled.   1. HTN, sinus tachycardia - HR improved, BP still uncontrolled. Increase Atenolol to 75mg and uptitrate as BP and HR tolerate, goal BP <130/80.  - Advised to check BP at home and keep a log. Bring log and machine to next visit.  - low sodium diet. - need CARLY treatment consider oral appliance or Inspire device- referred to Pulmonary. Pt reports having used CPAP and oral appliance many years ago but could not tolerate. She is willing to have another CARLY eval and discuss treatment options. Weight loss advised.   2. Exertional dyspnea -TTE and NST unrevealing. Symptoms intermittent. - Encouraged routine exercise and weight loss.   3. h/o L-breast cancer -on anastrozole -monitor LV EF and GLS  4. Obesity, CARLY -see above.   5. Follow up in 1 month for reassessment of BP and HR, sooner if needed.  Patient verbalized understanding and is in agreement with the above plan.  Coty Negrete was present in office at the time of visit. [EKG obtained to assist in diagnosis and management of assessed problem(s)] : EKG obtained to assist in diagnosis and management of assessed problem(s)

## 2024-07-29 NOTE — HISTORY OF PRESENT ILLNESS
[FreeTextEntry1] : 63F obesity (BMI 35), CARLY (intolerant to CPAP), h/o L-breast cancer (dx 2018, ER/NM+, HER2-) s/p neoadjuvant ddAC then Taxol and on anastrozole, bilateral mastectomy and radiation, HTN, presented for cardiac evaluation 4/2024, underwent TTE 5/2024 revealing LVE 55-60% and borderline LVH, Pharmacologic NST 5/2024 revealed no evidence of ischemia or infarction, presents for follow up of HTN today.  Reports she has not been having as many headaches and palpitations as she was last visit. States palpitations are "almost gone". Reports a few episodes of a momentary chest pain described as an "electric shock" which resolved spontaneously, occurring at random. States some days she feels fatigued and has no stamina for activities but others she feels fine. Reports intermittent RAY; she walks dogs for a living and some days feels no dyspnea and others she feels out of breath when walking. Reports lightheaded often, anytime of day and any activity, no particular pattern. Denies syncope, near syncope and LE edema. Denies smoking, excessive alcohol and illicit drug use. She has not been checking her BP at home.    Prior visit 4/25/2024: Reports noted HTN and tachycardia for few years, was seeing [facility] >1.5 years ago had Echo and Holter done told normal but not put on any medications, she was metoprolol stopped in 2019 as BP normalized after breast surgery, has chronic shortness of breath, no prior stress testing, walking about 3-4x per week for 45 minutes to an hour but unable to exercise due to chronic knee pain. Last seen PCP few years ago pending to establish care with new provider.   Office vitals noted hypertensive and tachycardic -130s started since 2021.   Prior TSH 2023 12.6 pending Endocrine eval.   Mother stroke Aunt with heart surgeries  HTN multiple family members Former smoker quit >30yrs Social alcohol use  Prior sleep study told with CARLY

## 2024-08-28 NOTE — DISCUSSION/SUMMARY
[FreeTextEntry1] : 63F obesity (BMI 35), CARLY (intolerant to CPAP), h/o L-breast cancer (dx 2018, ER/ND+, HER2-) s/p neoadjuvant ddAC then Taxol and on anastrozole, bilateral mastectomy and radiation, HTN, presented for cardiac evaluation 4/2024, underwent TTE 5/2024 revealing LVE 55-60% and borderline LVH, Pharmacologic NST 5/2024 revealed no evidence of ischemia or infarction, presents for follow up of HTN today.  Chronically uncontrolled HTN and chronic tachycardic since 2021, EKG in sinus rhythm 76bpm today. TTE and NST were WNL. BP still uncontrolled.   1. HTN, sinus tachycardia - HR improved, BP still uncontrolled. Increase Atenolol to 75mg and uptitrate as BP and HR tolerate, goal BP <130/80.  - Advised to check BP at home and keep a log. Bring log and machine to next visit.  - low sodium diet. - need CARLY treatment consider oral appliance or Inspire device- referred to Pulmonary. Pt reports having used CPAP and oral appliance many years ago but could not tolerate. She is willing to have another CARLY eval and discuss treatment options. Weight loss advised.   2. Exertional dyspnea -TTE and NST unrevealing. Symptoms intermittent. - Encouraged routine exercise and weight loss.   3. h/o L-breast cancer -on anastrozole -monitor LV EF and GLS  4. Obesity, CARLY -see above.   5. Follow up in 1 month for reassessment of BP and HR, sooner if needed.  Patient verbalized understanding and is in agreement with the above plan.  Coty Negrete was present in office at the time of visit.

## 2024-08-28 NOTE — HISTORY OF PRESENT ILLNESS
[FreeTextEntry1] : 63F obesity (BMI 35), CARLY (intolerant to CPAP), h/o L-breast cancer (dx 2018, ER/AZ+, HER2-) s/p neoadjuvant ddAC then Taxol and on anastrozole, bilateral mastectomy and radiation, HTN, presented for cardiac evaluation 4/2024, underwent TTE 5/2024 revealing LVE 55-60% and borderline LVH, Pharmacologic NST 5/2024 revealed no evidence of ischemia or infarction, last seen 7/2024 increased atenolol to 75mg, returns for follow up.   Prior visit 7/2024:  Reports she has not been having as many headaches and palpitations as she was last visit. States palpitations are "almost gone". Reports a few episodes of a momentary chest pain described as an "electric shock" which resolved spontaneously, occurring at random. States some days she feels fatigued and has no stamina for activities but others she feels fine. Reports intermittent RAY; she walks dogs for a living and some days feels no dyspnea and others she feels out of breath when walking. Reports lightheaded often, anytime of day and any activity, no particular pattern. Denies syncope, near syncope and LE edema. Denies smoking, excessive alcohol and illicit drug use. She has not been checking her BP at home.    Prior visit 4/25/2024: Reports noted HTN and tachycardia for few years, was seeing [place] [facility] >1.5 years ago had Echo and Holter done told normal but not put on any medications, she was metoprolol stopped in 2019 as BP normalized after breast surgery, has chronic shortness of breath, no prior stress testing, walking about 3-4x per week for 45 minutes to an hour but unable to exercise due to chronic knee pain. Last seen PCP few years ago pending to establish care with new provider.   Office vitals noted hypertensive and tachycardic -130s started since 2021.   Prior TSH 2023 12.6 pending Endocrine eval.   Mother stroke Aunt with heart surgeries  HTN multiple family members Former smoker quit >30yrs Social alcohol use  Prior sleep study told with CARLY

## 2024-08-28 NOTE — CARDIOLOGY SUMMARY
[de-identified] : 4/25/24- Sinus 111, normal axis, LVH, poor R-wave, QTc 395 [de-identified] : 9/2018- LV EF 65%, GSL -20%

## 2024-08-29 ENCOUNTER — APPOINTMENT (OUTPATIENT)
Dept: CARDIOLOGY | Facility: CLINIC | Age: 64
End: 2024-08-29

## 2024-10-11 ENCOUNTER — OUTPATIENT (OUTPATIENT)
Dept: OUTPATIENT SERVICES | Facility: HOSPITAL | Age: 64
LOS: 1 days | Discharge: HOME | End: 2024-10-11

## 2024-10-11 DIAGNOSIS — K82.9 DISEASE OF GALLBLADDER, UNSPECIFIED: Chronic | ICD-10-CM

## 2024-10-11 DIAGNOSIS — Z98.890 OTHER SPECIFIED POSTPROCEDURAL STATES: Chronic | ICD-10-CM

## 2024-10-11 DIAGNOSIS — C50.919 MALIGNANT NEOPLASM OF UNSPECIFIED SITE OF UNSPECIFIED FEMALE BREAST: ICD-10-CM

## 2024-10-14 ENCOUNTER — APPOINTMENT (OUTPATIENT)
Dept: HEMATOLOGY ONCOLOGY | Facility: CLINIC | Age: 64
End: 2024-10-14
Payer: COMMERCIAL

## 2024-10-14 ENCOUNTER — RESULT REVIEW (OUTPATIENT)
Age: 64
End: 2024-10-14

## 2024-10-14 ENCOUNTER — NON-APPOINTMENT (OUTPATIENT)
Age: 64
End: 2024-10-14

## 2024-10-14 ENCOUNTER — LABORATORY RESULT (OUTPATIENT)
Age: 64
End: 2024-10-14

## 2024-10-14 VITALS
DIASTOLIC BLOOD PRESSURE: 89 MMHG | BODY MASS INDEX: 34.75 KG/M2 | HEIGHT: 65 IN | WEIGHT: 208.55 LBS | HEART RATE: 83 BPM | OXYGEN SATURATION: 94 % | SYSTOLIC BLOOD PRESSURE: 123 MMHG

## 2024-10-14 DIAGNOSIS — R93.89 ABNORMAL FINDINGS ON DIAGNOSTIC IMAGING OF OTHER SPECIFIED BODY STRUCTURES: ICD-10-CM

## 2024-10-14 DIAGNOSIS — R19.7 DIARRHEA, UNSPECIFIED: ICD-10-CM

## 2024-10-14 DIAGNOSIS — R63.4 ABNORMAL WEIGHT LOSS: ICD-10-CM

## 2024-10-14 DIAGNOSIS — M54.9 DORSALGIA, UNSPECIFIED: ICD-10-CM

## 2024-10-14 DIAGNOSIS — C50.912 MALIGNANT NEOPLASM OF UNSPECIFIED SITE OF LEFT FEMALE BREAST: ICD-10-CM

## 2024-10-14 LAB
BASOPHILS # BLD AUTO: 0.1 K/UL — SIGNIFICANT CHANGE UP (ref 0–0.2)
BASOPHILS NFR BLD AUTO: 1.3 % — SIGNIFICANT CHANGE UP (ref 0–2)
EOSINOPHIL # BLD AUTO: 0.2 K/UL — SIGNIFICANT CHANGE UP (ref 0–0.5)
EOSINOPHIL NFR BLD AUTO: 3.9 % — SIGNIFICANT CHANGE UP (ref 0–6)
HCT VFR BLD CALC: 50 % — HIGH (ref 34.5–45)
HGB BLD-MCNC: 16.2 G/DL — HIGH (ref 11.5–15.5)
LYMPHOCYTES # BLD AUTO: 2 K/UL — SIGNIFICANT CHANGE UP (ref 1–3.3)
LYMPHOCYTES # BLD AUTO: 35.6 % — SIGNIFICANT CHANGE UP (ref 13–44)
MCHC RBC-ENTMCNC: 31.9 PG — SIGNIFICANT CHANGE UP (ref 27–34)
MCHC RBC-ENTMCNC: 32.3 G/DL — SIGNIFICANT CHANGE UP (ref 32–36)
MCV RBC AUTO: 98.8 FL — SIGNIFICANT CHANGE UP (ref 80–100)
MONOCYTES # BLD AUTO: 0.5 K/UL — SIGNIFICANT CHANGE UP (ref 0–0.9)
MONOCYTES NFR BLD AUTO: 8.1 % — SIGNIFICANT CHANGE UP (ref 2–14)
NEUTROPHILS # BLD AUTO: 2.9 K/UL — SIGNIFICANT CHANGE UP (ref 1.8–7.4)
NEUTROPHILS NFR BLD AUTO: 51.1 % — SIGNIFICANT CHANGE UP (ref 43–77)
PLATELET # BLD AUTO: 239 K/UL — SIGNIFICANT CHANGE UP (ref 150–400)
RBC # BLD: 5.07 M/UL — SIGNIFICANT CHANGE UP (ref 3.8–5.2)
RBC # FLD: 12.3 % — SIGNIFICANT CHANGE UP (ref 10.3–14.5)
WBC # BLD: 5.7 K/UL — SIGNIFICANT CHANGE UP (ref 3.8–10.5)
WBC # FLD AUTO: 5.7 K/UL — SIGNIFICANT CHANGE UP (ref 3.8–10.5)

## 2024-10-14 PROCEDURE — G2211 COMPLEX E/M VISIT ADD ON: CPT | Mod: NC

## 2024-10-14 PROCEDURE — 99215 OFFICE O/P EST HI 40 MIN: CPT

## 2024-10-14 RX ORDER — ACETAMINOPHEN/DIPHENHYDRAMINE 500MG-25MG
TABLET ORAL
Refills: 0 | Status: ACTIVE | COMMUNITY

## 2024-10-14 RX ORDER — FOLIC ACID 0.8 MG
500 TABLET ORAL
Refills: 0 | Status: ACTIVE | COMMUNITY

## 2024-10-14 RX ORDER — MULTIVIT-MIN/IRON/FOLIC ACID/K 18-600-40
50 MCG CAPSULE ORAL
Refills: 0 | Status: ACTIVE | COMMUNITY

## 2024-10-15 LAB — TSH SERPL-ACNC: 6.79 UIU/ML

## 2024-10-16 ENCOUNTER — NON-APPOINTMENT (OUTPATIENT)
Age: 64
End: 2024-10-16

## 2024-10-17 ENCOUNTER — TRANSCRIPTION ENCOUNTER (OUTPATIENT)
Age: 64
End: 2024-10-17

## 2024-10-21 ENCOUNTER — TRANSCRIPTION ENCOUNTER (OUTPATIENT)
Age: 64
End: 2024-10-21

## 2024-11-07 ENCOUNTER — APPOINTMENT (OUTPATIENT)
Dept: NUCLEAR MEDICINE | Facility: CLINIC | Age: 64
End: 2024-11-07

## 2024-11-07 ENCOUNTER — APPOINTMENT (OUTPATIENT)
Dept: CT IMAGING | Facility: CLINIC | Age: 64
End: 2024-11-07

## 2025-01-07 ENCOUNTER — RX RENEWAL (OUTPATIENT)
Age: 65
End: 2025-01-07

## 2025-04-18 ENCOUNTER — OUTPATIENT (OUTPATIENT)
Dept: OUTPATIENT SERVICES | Facility: HOSPITAL | Age: 65
LOS: 1 days | Discharge: HOME | End: 2025-04-18

## 2025-04-18 DIAGNOSIS — C50.919 MALIGNANT NEOPLASM OF UNSPECIFIED SITE OF UNSPECIFIED FEMALE BREAST: ICD-10-CM

## 2025-04-18 DIAGNOSIS — K82.9 DISEASE OF GALLBLADDER, UNSPECIFIED: Chronic | ICD-10-CM

## 2025-04-18 DIAGNOSIS — Z98.890 OTHER SPECIFIED POSTPROCEDURAL STATES: Chronic | ICD-10-CM

## 2025-04-21 ENCOUNTER — APPOINTMENT (OUTPATIENT)
Dept: HEMATOLOGY ONCOLOGY | Facility: CLINIC | Age: 65
End: 2025-04-21

## 2025-05-14 ENCOUNTER — RX RENEWAL (OUTPATIENT)
Age: 65
End: 2025-05-14

## 2025-06-18 ENCOUNTER — OUTPATIENT (OUTPATIENT)
Dept: OUTPATIENT SERVICES | Facility: HOSPITAL | Age: 65
LOS: 1 days | Discharge: HOME | End: 2025-06-18

## 2025-06-18 DIAGNOSIS — Z98.890 OTHER SPECIFIED POSTPROCEDURAL STATES: Chronic | ICD-10-CM

## 2025-06-18 DIAGNOSIS — K82.9 DISEASE OF GALLBLADDER, UNSPECIFIED: Chronic | ICD-10-CM

## 2025-06-24 ENCOUNTER — NON-APPOINTMENT (OUTPATIENT)
Age: 65
End: 2025-06-24

## 2025-06-26 ENCOUNTER — APPOINTMENT (OUTPATIENT)
Dept: HEMATOLOGY ONCOLOGY | Facility: CLINIC | Age: 65
End: 2025-06-26
Payer: COMMERCIAL

## 2025-06-26 ENCOUNTER — RESULT REVIEW (OUTPATIENT)
Age: 65
End: 2025-06-26

## 2025-06-26 VITALS
DIASTOLIC BLOOD PRESSURE: 85 MMHG | OXYGEN SATURATION: 96 % | HEART RATE: 91 BPM | TEMPERATURE: 98.1 F | BODY MASS INDEX: 34.66 KG/M2 | HEIGHT: 65 IN | SYSTOLIC BLOOD PRESSURE: 129 MMHG | WEIGHT: 208 LBS

## 2025-06-26 LAB
BASOPHILS # BLD AUTO: 0.04 K/UL — SIGNIFICANT CHANGE UP (ref 0–0.2)
BASOPHILS NFR BLD AUTO: 0.8 % — SIGNIFICANT CHANGE UP (ref 0–2)
EOSINOPHIL # BLD AUTO: 0.13 K/UL — SIGNIFICANT CHANGE UP (ref 0–0.5)
EOSINOPHIL NFR BLD AUTO: 2.5 % — SIGNIFICANT CHANGE UP (ref 0–6)
HCT VFR BLD CALC: 46.4 % — HIGH (ref 34.5–45)
HGB BLD-MCNC: 15.1 G/DL — SIGNIFICANT CHANGE UP (ref 11.5–15.5)
IMM GRANULOCYTES # BLD AUTO: 0.01 K/UL — SIGNIFICANT CHANGE UP (ref 0–0.07)
IMM GRANULOCYTES NFR BLD AUTO: 0.2 % — SIGNIFICANT CHANGE UP (ref 0–0.9)
LYMPHOCYTES # BLD AUTO: 1.49 K/UL — SIGNIFICANT CHANGE UP (ref 1–3.3)
LYMPHOCYTES NFR BLD AUTO: 29 % — SIGNIFICANT CHANGE UP (ref 13–44)
MCHC RBC-ENTMCNC: 31.1 PG — SIGNIFICANT CHANGE UP (ref 27–34)
MCHC RBC-ENTMCNC: 32.5 G/DL — SIGNIFICANT CHANGE UP (ref 32–36)
MCV RBC AUTO: 95.7 FL — SIGNIFICANT CHANGE UP (ref 80–100)
MONOCYTES # BLD AUTO: 0.33 K/UL — SIGNIFICANT CHANGE UP (ref 0–0.9)
MONOCYTES NFR BLD AUTO: 6.4 % — SIGNIFICANT CHANGE UP (ref 2–14)
NEUTROPHILS # BLD AUTO: 3.13 K/UL — SIGNIFICANT CHANGE UP (ref 1.8–7.4)
NEUTROPHILS NFR BLD AUTO: 61.1 % — SIGNIFICANT CHANGE UP (ref 43–77)
NRBC # BLD AUTO: 0 K/UL — SIGNIFICANT CHANGE UP (ref 0–0)
NRBC # FLD: 0 K/UL — SIGNIFICANT CHANGE UP (ref 0–0)
NRBC BLD AUTO-RTO: 0 /100 WBCS — SIGNIFICANT CHANGE UP (ref 0–0)
PLATELET # BLD AUTO: 258 K/UL — SIGNIFICANT CHANGE UP (ref 150–400)
PMV BLD: 11.2 FL — SIGNIFICANT CHANGE UP (ref 7–13)
RBC # BLD: 4.85 M/UL — SIGNIFICANT CHANGE UP (ref 3.8–5.2)
RBC # FLD: 12.9 % — SIGNIFICANT CHANGE UP (ref 10.3–14.5)
WBC # BLD: 5.13 K/UL — SIGNIFICANT CHANGE UP (ref 3.8–10.5)
WBC # FLD AUTO: 5.13 K/UL — SIGNIFICANT CHANGE UP (ref 3.8–10.5)

## 2025-06-26 PROCEDURE — G2211 COMPLEX E/M VISIT ADD ON: CPT | Mod: NC

## 2025-06-26 PROCEDURE — 99214 OFFICE O/P EST MOD 30 MIN: CPT

## 2025-06-27 ENCOUNTER — NON-APPOINTMENT (OUTPATIENT)
Age: 65
End: 2025-06-27

## 2025-06-30 ENCOUNTER — APPOINTMENT (OUTPATIENT)
Dept: CT IMAGING | Facility: CLINIC | Age: 65
End: 2025-06-30
Payer: COMMERCIAL

## 2025-06-30 ENCOUNTER — OUTPATIENT (OUTPATIENT)
Dept: OUTPATIENT SERVICES | Facility: HOSPITAL | Age: 65
LOS: 1 days | End: 2025-06-30
Payer: COMMERCIAL

## 2025-06-30 DIAGNOSIS — Z98.890 OTHER SPECIFIED POSTPROCEDURAL STATES: Chronic | ICD-10-CM

## 2025-06-30 DIAGNOSIS — Z00.8 ENCOUNTER FOR OTHER GENERAL EXAMINATION: ICD-10-CM

## 2025-06-30 DIAGNOSIS — K82.9 DISEASE OF GALLBLADDER, UNSPECIFIED: Chronic | ICD-10-CM

## 2025-06-30 LAB
25(OH)D3 SERPL-MCNC: 72.6 NG/ML
ALBUMIN SERPL ELPH-MCNC: 4.4 G/DL
ALP BLD-CCNC: 94 U/L
ALT SERPL-CCNC: 17 U/L
ANION GAP SERPL CALC-SCNC: 12 MMOL/L
AST SERPL-CCNC: 19 U/L
BILIRUB SERPL-MCNC: 0.5 MG/DL
BUN SERPL-MCNC: 16 MG/DL
CALCIUM SERPL-MCNC: 10 MG/DL
CHLORIDE SERPL-SCNC: 102 MMOL/L
CO2 SERPL-SCNC: 24 MMOL/L
CREAT SERPL-MCNC: 0.83 MG/DL
EGFRCR SERPLBLD CKD-EPI 2021: 79 ML/MIN/1.73M2
GLUCOSE SERPL-MCNC: 134 MG/DL
POTASSIUM SERPL-SCNC: 6.3 MMOL/L
PROT SERPL-MCNC: 7.1 G/DL
SODIUM SERPL-SCNC: 138 MMOL/L

## 2025-06-30 PROCEDURE — 74177 CT ABD & PELVIS W/CONTRAST: CPT

## 2025-06-30 PROCEDURE — 74177 CT ABD & PELVIS W/CONTRAST: CPT | Mod: 26

## 2025-06-30 PROCEDURE — 71260 CT THORAX DX C+: CPT

## 2025-06-30 PROCEDURE — 71260 CT THORAX DX C+: CPT | Mod: 26

## 2025-08-08 ENCOUNTER — TRANSCRIPTION ENCOUNTER (OUTPATIENT)
Age: 65
End: 2025-08-08

## 2025-08-20 ENCOUNTER — LABORATORY RESULT (OUTPATIENT)
Age: 65
End: 2025-08-20

## 2025-08-20 ENCOUNTER — APPOINTMENT (OUTPATIENT)
Dept: FAMILY MEDICINE | Facility: CLINIC | Age: 65
End: 2025-08-20
Payer: COMMERCIAL

## 2025-08-20 VITALS
HEIGHT: 64.5 IN | OXYGEN SATURATION: 96 % | BODY MASS INDEX: 35.08 KG/M2 | SYSTOLIC BLOOD PRESSURE: 117 MMHG | TEMPERATURE: 97.2 F | HEART RATE: 85 BPM | WEIGHT: 208 LBS | DIASTOLIC BLOOD PRESSURE: 83 MMHG

## 2025-08-20 DIAGNOSIS — Z83.49 FAMILY HISTORY OF OTHER ENDOCRINE, NUTRITIONAL AND METABOLIC DISEASES: ICD-10-CM

## 2025-08-20 DIAGNOSIS — E87.6 HYPOKALEMIA: ICD-10-CM

## 2025-08-20 DIAGNOSIS — Z76.89 PERSONS ENCOUNTERING HEALTH SERVICES IN OTHER SPECIFIED CIRCUMSTANCES: ICD-10-CM

## 2025-08-20 DIAGNOSIS — G47.00 INSOMNIA, UNSPECIFIED: ICD-10-CM

## 2025-08-20 DIAGNOSIS — E06.3 AUTOIMMUNE THYROIDITIS: ICD-10-CM

## 2025-08-20 DIAGNOSIS — I10 ESSENTIAL (PRIMARY) HYPERTENSION: ICD-10-CM

## 2025-08-20 DIAGNOSIS — Z82.3 FAMILY HISTORY OF STROKE: ICD-10-CM

## 2025-08-20 DIAGNOSIS — R22.41 LOCALIZED SWELLING, MASS AND LUMP, RIGHT LOWER LIMB: ICD-10-CM

## 2025-08-20 DIAGNOSIS — C50.912 MALIGNANT NEOPLASM OF UNSPECIFIED SITE OF LEFT FEMALE BREAST: ICD-10-CM

## 2025-08-20 DIAGNOSIS — Z82.49 FAMILY HISTORY OF ISCHEMIC HEART DISEASE AND OTHER DISEASES OF THE CIRCULATORY SYSTEM: ICD-10-CM

## 2025-08-20 DIAGNOSIS — G62.0 DRUG-INDUCED POLYNEUROPATHY: ICD-10-CM

## 2025-08-20 DIAGNOSIS — T45.1X5A DRUG-INDUCED POLYNEUROPATHY: ICD-10-CM

## 2025-08-20 PROCEDURE — 99205 OFFICE O/P NEW HI 60 MIN: CPT | Mod: 25

## 2025-08-20 PROCEDURE — 96160 PT-FOCUSED HLTH RISK ASSMT: CPT | Mod: 59

## 2025-08-20 PROCEDURE — G0444 DEPRESSION SCREEN ANNUAL: CPT | Mod: 59

## 2025-08-22 DIAGNOSIS — M89.9 DISORDER OF BONE, UNSPECIFIED: ICD-10-CM

## 2025-08-22 LAB
25(OH)D3 SERPL-MCNC: 84.2 NG/ML
ALBUMIN SERPL ELPH-MCNC: 4.5 G/DL
ALBUMIN, RANDOM URINE: 2.9 MG/DL
ALP BLD-CCNC: 92 U/L
ALT SERPL-CCNC: 20 U/L
ANION GAP SERPL CALC-SCNC: 15 MMOL/L
APPEARANCE: CLEAR
AST SERPL-CCNC: 19 U/L
B BURGDOR AB SER-IMP: NEGATIVE
B BURGDOR IGG+IGM SER QL: 0.17 INDEX
BASOPHILS # BLD AUTO: 0.03 K/UL
BASOPHILS NFR BLD AUTO: 0.7 %
BILIRUB SERPL-MCNC: 0.4 MG/DL
BILIRUBIN URINE: ABNORMAL
BLOOD URINE: NEGATIVE
BUN SERPL-MCNC: 20 MG/DL
CALCIUM SERPL-MCNC: 10.6 MG/DL
CHLORIDE SERPL-SCNC: 104 MMOL/L
CHOLEST SERPL-MCNC: 282 MG/DL
CO2 SERPL-SCNC: 23 MMOL/L
COLOR: NORMAL
CREAT SERPL-MCNC: 0.87 MG/DL
CREAT SPEC-SCNC: 309 MG/DL
EGFRCR SERPLBLD CKD-EPI 2021: 74 ML/MIN/1.73M2
EOSINOPHIL # BLD AUTO: 0.12 K/UL
EOSINOPHIL NFR BLD AUTO: 2.7 %
ERYTHROCYTE [SEDIMENTATION RATE] IN BLOOD BY WESTERGREN METHOD: 4 MM/HR
ESTIMATED AVERAGE GLUCOSE: 117 MG/DL
FERRITIN SERPL-MCNC: 299 NG/ML
FOLATE SERPL-MCNC: 5.1 NG/ML
GLUCOSE QUALITATIVE U: NEGATIVE MG/DL
GLUCOSE SERPL-MCNC: 93 MG/DL
HBA1C MFR BLD HPLC: 5.7 %
HCT VFR BLD CALC: 47.8 %
HDLC SERPL-MCNC: 70 MG/DL
HGB BLD-MCNC: 15.7 G/DL
IMM GRANULOCYTES NFR BLD AUTO: 0.2 %
KETONES URINE: ABNORMAL MG/DL
LDLC SERPL-MCNC: 189 MG/DL
LEUKOCYTE ESTERASE URINE: ABNORMAL
LYMPHOCYTES # BLD AUTO: 1.17 K/UL
LYMPHOCYTES NFR BLD AUTO: 26.7 %
MAN DIFF?: NORMAL
MCHC RBC-ENTMCNC: 31.8 PG
MCHC RBC-ENTMCNC: 32.8 G/DL
MCV RBC AUTO: 96.8 FL
MICROALBUMIN/CREAT 24H UR-RTO: 9 MG/G
MONOCYTES # BLD AUTO: 0.36 K/UL
MONOCYTES NFR BLD AUTO: 8.2 %
NEUTROPHILS # BLD AUTO: 2.69 K/UL
NEUTROPHILS NFR BLD AUTO: 61.5 %
NITRITE URINE: NEGATIVE
NONHDLC SERPL-MCNC: 212 MG/DL
PH URINE: 5.5
PLATELET # BLD AUTO: 238 K/UL
POTASSIUM SERPL-SCNC: 5.1 MMOL/L
PROT SERPL-MCNC: 7 G/DL
PROTEIN URINE: NORMAL MG/DL
RBC # BLD: 4.94 M/UL
RBC # FLD: 13.2 %
RHEUMATOID FACT SERPL-ACNC: <10 IU/ML
SODIUM SERPL-SCNC: 142 MMOL/L
SPECIFIC GRAVITY URINE: 1.02
T3 SERPL-MCNC: 108 NG/DL
T3FREE SERPL-MCNC: 2.59 PG/ML
T3RU NFR SERPL: 1 TBI
T4 FREE SERPL-MCNC: 0.8 NG/DL
T4 SERPL-MCNC: 4.9 UG/DL
THYROGLOB AB SERPL-ACNC: >4000 IU/ML
THYROPEROXIDASE AB SERPL IA-ACNC: 303 IU/ML
TRIGL SERPL-MCNC: 130 MG/DL
TSH RECEPTOR AB: <1.1 IU/L
TSH SERPL-ACNC: 13.8 UIU/ML
TTG IGA SER IA-ACNC: <0.5 U/ML
TTG IGA SER-ACNC: NEGATIVE
TTG IGG SER IA-ACNC: <0.8 U/ML
TTG IGG SER IA-ACNC: NEGATIVE
UROBILINOGEN URINE: 0.2 MG/DL
VIT B12 SERPL-MCNC: 388 PG/ML
WBC # FLD AUTO: 4.38 K/UL

## 2025-08-22 RX ORDER — LEVOTHYROXINE SODIUM 0.05 MG/1
50 TABLET ORAL
Qty: 90 | Refills: 3 | Status: ACTIVE | COMMUNITY
Start: 2025-08-22 | End: 1900-01-01

## 2025-08-25 LAB
ALMOND IGE QN: <0.1 KUA/L
ANA TITR SER: NEGATIVE
ANAPLASMA PHAGOCYTOPHILIA IGG ANTIBODIES: NEGATIVE
ANAPLASMA PHAGOCYTOPHILIA IGM ANTIBODIES: NEGATIVE
B MICROTI IGG TITR SER: NORMAL
B MICROTI IGM TITR SER: NORMAL
BRAZIL NUT IGE QN: <0.1 KUA/L
CASHEW NUT IGE QN: <0.1 KUA/L
CODFISH IGE QN: <0.1 KUA/L
COW MILK IGE QN: <0.1 KUA/L
DEPRECATED ALMOND IGE RAST QL: 0
DEPRECATED BRAZIL NUT IGE RAST QL: 0
DEPRECATED CASHEW NUT IGE RAST QL: 0
DEPRECATED CODFISH IGE RAST QL: 0
DEPRECATED COW MILK IGE RAST QL: 0
DEPRECATED EGG WHITE IGE RAST QL: 0
DEPRECATED GLUTEN IGE RAST QL: <0.1 KUA/L
DEPRECATED HAZELNUT IGE RAST QL: 0
DEPRECATED PEANUT IGE RAST QL: 0
DEPRECATED SALMON IGE RAST QL: 0
DEPRECATED SESAME SEED IGE RAST QL: 0
DEPRECATED SHRIMP IGE RAST QL: 0
DEPRECATED SOYBEAN IGE RAST QL: 0
DEPRECATED TUNA IGE RAST QL: 0
DEPRECATED WALNUT IGE RAST QL: 0
DEPRECATED WHEAT IGE RAST QL: 0
EGG WHITE IGE QN: <0.1 KUA/L
EHRLICIA CHAFFEENIS IGG ANTIBODIES: NEGATIVE
EHRLICIA CHAFFEENIS IGM ANTIBODIES: NEGATIVE
ERHLICIA RESULT COMMENT: NORMAL
GALACTOSE, ALPHA (O215) CONC: <0.1 KUA/L
GALACTOSE-ALPHA-1,3-GALACTOSE IGE: 0
GLUTEN IGG QN: 0
HAZELNUT IGE QN: <0.1 KUA/L
PEANUT IGE QN: <0.1 KUA/L
R RICKETTSI IGG CSF-ACNC: NORMAL
R RICKETTSI IGM CSF-ACNC: NORMAL
ROCKY MT SPOTTED FEVER COMMENT: NORMAL
SALMON IGE QN: <0.1 KUA/L
SCALLOP IGE QN: 0.15 KUA/L
SCALLOP IGE QN: NORMAL
SESAME SEED IGE QN: <0.1 KUA/L
SHRIMP IGE QN: <0.1 KUA/L
SOYBEAN IGE QN: <0.1 KUA/L
TOTAL IGE SMQN RAST: 450 KU/L
TSI ACT/NOR SER: <0.1 IU/L
TUNA IGE QN: <0.1 KUA/L
WALNUT IGE QN: <0.1 KUA/L
WHEAT IGE QN: <0.1 KUA/L

## 2025-08-26 LAB
CALCIUM SERPL-MCNC: 10 MG/DL
F. TULARENSIS AB, IGG: NEGATIVE
F. TULARENSIS AB, IGM: NEGATIVE
F. TULARENSIS INTERPRETATION: NORMAL
PARATHYROID HORMONE INTACT: 55 PG/ML

## 2025-09-16 ENCOUNTER — APPOINTMENT (OUTPATIENT)
Dept: CARDIOLOGY | Facility: CLINIC | Age: 65
End: 2025-09-16
Payer: COMMERCIAL

## 2025-09-16 VITALS
BODY MASS INDEX: 34.74 KG/M2 | HEIGHT: 64.5 IN | SYSTOLIC BLOOD PRESSURE: 143 MMHG | DIASTOLIC BLOOD PRESSURE: 94 MMHG | OXYGEN SATURATION: 95 % | WEIGHT: 206 LBS | HEART RATE: 104 BPM

## 2025-09-16 VITALS — SYSTOLIC BLOOD PRESSURE: 140 MMHG | DIASTOLIC BLOOD PRESSURE: 90 MMHG

## 2025-09-16 DIAGNOSIS — I10 ESSENTIAL (PRIMARY) HYPERTENSION: ICD-10-CM

## 2025-09-16 DIAGNOSIS — E66.9 OBESITY, UNSPECIFIED: ICD-10-CM

## 2025-09-16 PROCEDURE — 93000 ELECTROCARDIOGRAM COMPLETE: CPT

## 2025-09-16 PROCEDURE — 99214 OFFICE O/P EST MOD 30 MIN: CPT | Mod: 25
